# Patient Record
Sex: FEMALE | Race: WHITE
[De-identification: names, ages, dates, MRNs, and addresses within clinical notes are randomized per-mention and may not be internally consistent; named-entity substitution may affect disease eponyms.]

---

## 2019-08-28 ENCOUNTER — HOSPITAL ENCOUNTER (OUTPATIENT)
Dept: HOSPITAL 47 - LABWHC1 | Age: 79
Discharge: HOME | End: 2019-08-28
Attending: PSYCHIATRY & NEUROLOGY
Payer: MEDICARE

## 2019-08-28 DIAGNOSIS — E78.5: Primary | ICD-10-CM

## 2019-08-28 DIAGNOSIS — R41.3: ICD-10-CM

## 2019-08-28 LAB
CHOLEST SERPL-MCNC: 207 MG/DL (ref 0–200)
DSDNA AB SER QL: NEGATIVE
DSDNA AB TITR SER: 1 IU/ML
HDLC SERPL-MCNC: 71 MG/DL (ref 40–60)
LDLC SERPL CALC-MCNC: 116.2 MG/DL (ref 0–131)
T4 FREE SERPL-MCNC: 0.8 NG/DL (ref 0.8–1.8)
TRIGL SERPL-MCNC: 99 MG/DL (ref 0–149)
VLDLC SERPL CALC-MCNC: 19.8 MG/DL (ref 5–40)

## 2019-08-28 PROCEDURE — 85652 RBC SED RATE AUTOMATED: CPT

## 2019-08-28 PROCEDURE — 36415 COLL VENOUS BLD VENIPUNCTURE: CPT

## 2019-08-28 PROCEDURE — 86038 ANTINUCLEAR ANTIBODIES: CPT

## 2019-08-28 PROCEDURE — 80061 LIPID PANEL: CPT

## 2019-08-28 PROCEDURE — 86618 LYME DISEASE ANTIBODY: CPT

## 2019-08-28 PROCEDURE — 84439 ASSAY OF FREE THYROXINE: CPT

## 2019-08-28 PROCEDURE — 86780 TREPONEMA PALLIDUM: CPT

## 2019-08-28 PROCEDURE — 84443 ASSAY THYROID STIM HORMONE: CPT

## 2019-08-28 PROCEDURE — 82607 VITAMIN B-12: CPT

## 2019-08-28 PROCEDURE — 82747 ASSAY OF FOLIC ACID RBC: CPT

## 2019-08-28 PROCEDURE — 86225 DNA ANTIBODY NATIVE: CPT

## 2019-08-28 PROCEDURE — 86431 RHEUMATOID FACTOR QUANT: CPT

## 2019-08-29 LAB — B BURGDOR IGG SERPL QL IA: 0.06 INDEX

## 2019-09-13 ENCOUNTER — HOSPITAL ENCOUNTER (OUTPATIENT)
Dept: HOSPITAL 47 - RADMRIMAIN | Age: 79
Discharge: HOME | End: 2019-09-13
Attending: PSYCHIATRY & NEUROLOGY
Payer: MEDICARE

## 2019-09-13 DIAGNOSIS — Z87.39: ICD-10-CM

## 2019-09-13 DIAGNOSIS — I63.9: ICD-10-CM

## 2019-09-13 DIAGNOSIS — I73.9: Primary | ICD-10-CM

## 2019-09-13 PROCEDURE — 70551 MRI BRAIN STEM W/O DYE: CPT

## 2019-09-13 NOTE — MR
EXAMINATION TYPE: MR brain wo con

 

DATE OF EXAM: 9/13/2019

 

COMPARISON: NONE

 

HISTORY: CVA, Memory loss, discoordination

 

TECHNIQUE: 

Multiplanar, multisequence images of the brain and brainstem is performed without IV contrast.

 

FINDINGS: Diffusion weighted images demonstrate no evidence of a recent infarct or other diffusion ab
normality.  There is no extra-axial fluid collection. Extensive confluent white matter changes are se
en within the periventricular and subcortical white matter as well as within the pericallosal white m
atter. No infratentorial white matter change or brainstem white matter change. Old lacunar injuries a
re seen of the anterior limbs of the internal capsules The ventricular system and cisternal spaces ar
e symmetrically prominent compatible with age-related volume loss.

 

Midline structures demonstrate normal morphology.  The craniocervical junction appears within normal 
limits. The dural venous sinuses appear patent. The visualized sinuses displayed mild mucosal thicken
ing in the ethmoid sinuses and are otherwise clear and the globes are intact. There is partial opacif
ication of the left mastoid air cells. T2/FLAIR hyperintensity possible sebaceous cyst is seen within
 the left auricle.

 

IMPRESSION: 

1. Severe burden nonspecific white matter change, most commonly on the basis of chronic microangiopat
hy with age-related volume loss. No acute infarct, midline shift or mass effect.

2. Partial opacification of the left mastoid air cells. Correlate with point tenderness to exclude ma
stoiditis.

3. Possible sebaceous cyst within the left auricle. Correlate with physical exam.

4. Old lacunar injuries of the bilateral anterior limbs of the internal capsules.

## 2019-10-16 ENCOUNTER — HOSPITAL ENCOUNTER (OUTPATIENT)
Dept: HOSPITAL 47 - RADUSWWP | Age: 79
Discharge: HOME | End: 2019-10-16
Attending: PSYCHIATRY & NEUROLOGY
Payer: MEDICARE

## 2019-10-16 DIAGNOSIS — I65.23: Primary | ICD-10-CM

## 2019-10-16 DIAGNOSIS — G45.9: ICD-10-CM

## 2019-10-16 PROCEDURE — 93880 EXTRACRANIAL BILAT STUDY: CPT

## 2019-10-16 NOTE — US
EXAMINATION TYPE: US carotid duplex BILAT

 

DATE OF EXAM: 10/16/2019

 

COMPARISON: NONE

 

CLINICAL HISTORY: G45.9 Transient cerebral ischemic attack, unspecif. TIA

 

EXAM MEASUREMENTS: 

 

RIGHT:  Peak Systolic Velocity (PSV) cm/sec

----- Right CCA:  98.2  

----- Right ICA:  143     

----- Right ECA:  106   

ICA/CCA ratio:  1.46    

 

RIGHT:  End Diastole cm/sec

----- Right CCA:  30.4   

----- Right ICA:  45.5      

----- Right ECA:  25.2     

 

LEFT:  Peak Systolic Velocity (PSV) cm/sec

----- Left CCA:  89.4  

----- Left ICA:  109   

----- Left ECA:  67.5  

ICA/CCA ratio:  1.22  

 

LEFT:  End Diastole cm/sec

----- Left CCA:  31.0  

----- Left ICA:  37.3   

----- Left ECA:  15.9 

 

VERTEBRALS (direction of flow):

Right Vertebral: Antegrade

Left Vertebral: Antegrade

 

Rhythm:  Normal

 

Moderate to severe plaque right bifurcation. Mild plaque left bifurcation. Increased velocities  righ
t ICA

 

 

 

IMPRESSION:  

1. Moderate plaquing right carotid bifurcation between 50 and 69%.

2. Mild plaquing left carotid bifurcation with less than 50%.   

 

 

Criteria for Assigning % of Stenosis / Diameter reduction

(Estimation based on the indirect measurements of the internal carotid artery velocities (ICA PSV).

1.  Normal (no stenosis)=ICA PSV < 125 cm/s: ratio < 2.0: ICA EDV<40 cm/s.

2. Less than 50% stenosis=ICA PSV < 125 cm/s: ratio < 2.0: ICA EDV<40 cm/s.

3.  50 to 69% stenosis=ICA PSV of 125 to 230 cm/s: ration 2.0 ? 4.0: ICA EDV  cm/s.

4.  Greater than 70% stenosis to near occlusion= ICA PSV > 230 cm/s: ratio > 4.0: ICA EDV > 100 cm/s.
 

5.  Near occlusion= ICA PSV velocities may be low or undetectable: variable ratio and ICA EDV.

6.  Total occlusion=unable to detect flow.

## 2021-01-22 ENCOUNTER — HOSPITAL ENCOUNTER (EMERGENCY)
Dept: HOSPITAL 47 - EC | Age: 81
Discharge: HOME | End: 2021-01-22
Payer: MEDICARE

## 2021-01-22 VITALS
DIASTOLIC BLOOD PRESSURE: 59 MMHG | HEART RATE: 104 BPM | TEMPERATURE: 97.6 F | SYSTOLIC BLOOD PRESSURE: 123 MMHG | RESPIRATION RATE: 18 BRPM

## 2021-01-22 DIAGNOSIS — I10: ICD-10-CM

## 2021-01-22 DIAGNOSIS — K59.00: Primary | ICD-10-CM

## 2021-01-22 DIAGNOSIS — Z98.42: ICD-10-CM

## 2021-01-22 DIAGNOSIS — Z79.899: ICD-10-CM

## 2021-01-22 DIAGNOSIS — E78.5: ICD-10-CM

## 2021-01-22 DIAGNOSIS — F17.200: ICD-10-CM

## 2021-01-22 DIAGNOSIS — Z98.41: ICD-10-CM

## 2021-01-22 LAB
ALBUMIN SERPL-MCNC: 4.2 G/DL (ref 3.5–5)
ALP SERPL-CCNC: 109 U/L (ref 38–126)
ALT SERPL-CCNC: 16 U/L (ref 4–34)
ANION GAP SERPL CALC-SCNC: 10 MMOL/L
AST SERPL-CCNC: 30 U/L (ref 14–36)
BASOPHILS # BLD AUTO: 0.1 K/UL (ref 0–0.2)
BASOPHILS NFR BLD AUTO: 1 %
BUN SERPL-SCNC: 34 MG/DL (ref 7–17)
CALCIUM SPEC-MCNC: 9.9 MG/DL (ref 8.4–10.2)
CHLORIDE SERPL-SCNC: 106 MMOL/L (ref 98–107)
CO2 SERPL-SCNC: 26 MMOL/L (ref 22–30)
EOSINOPHIL # BLD AUTO: 0.1 K/UL (ref 0–0.7)
EOSINOPHIL NFR BLD AUTO: 1 %
ERYTHROCYTE [DISTWIDTH] IN BLOOD BY AUTOMATED COUNT: 3.64 M/UL (ref 3.8–5.4)
ERYTHROCYTE [DISTWIDTH] IN BLOOD: 13.5 % (ref 11.5–15.5)
GLUCOSE SERPL-MCNC: 128 MG/DL (ref 74–99)
HCT VFR BLD AUTO: 31.8 % (ref 34–46)
HGB BLD-MCNC: 10.9 GM/DL (ref 11.4–16)
LIPASE SERPL-CCNC: 31 U/L (ref 23–300)
LYMPHOCYTES # SPEC AUTO: 0.8 K/UL (ref 1–4.8)
LYMPHOCYTES NFR SPEC AUTO: 6 %
MCH RBC QN AUTO: 30 PG (ref 25–35)
MCHC RBC AUTO-ENTMCNC: 34.4 G/DL (ref 31–37)
MCV RBC AUTO: 87.2 FL (ref 80–100)
MONOCYTES # BLD AUTO: 0.6 K/UL (ref 0–1)
MONOCYTES NFR BLD AUTO: 5 %
NEUTROPHILS # BLD AUTO: 11.1 K/UL (ref 1.3–7.7)
NEUTROPHILS NFR BLD AUTO: 87 %
PLATELET # BLD AUTO: 260 K/UL (ref 150–450)
POTASSIUM SERPL-SCNC: 3.8 MMOL/L (ref 3.5–5.1)
PROT SERPL-MCNC: 6.9 G/DL (ref 6.3–8.2)
SODIUM SERPL-SCNC: 142 MMOL/L (ref 137–145)
WBC # BLD AUTO: 12.8 K/UL (ref 3.8–10.6)

## 2021-01-22 PROCEDURE — 83690 ASSAY OF LIPASE: CPT

## 2021-01-22 PROCEDURE — 99284 EMERGENCY DEPT VISIT MOD MDM: CPT

## 2021-01-22 PROCEDURE — 93005 ELECTROCARDIOGRAM TRACING: CPT

## 2021-01-22 PROCEDURE — 85025 COMPLETE CBC W/AUTO DIFF WBC: CPT

## 2021-01-22 PROCEDURE — 96360 HYDRATION IV INFUSION INIT: CPT

## 2021-01-22 PROCEDURE — 80053 COMPREHEN METABOLIC PANEL: CPT

## 2021-01-22 PROCEDURE — 74176 CT ABD & PELVIS W/O CONTRAST: CPT

## 2021-01-22 PROCEDURE — 83605 ASSAY OF LACTIC ACID: CPT

## 2021-01-22 NOTE — CT
EXAMINATION TYPE: CT abdomen pelvis wo con

 

DATE OF EXAM: 1/22/2021

 

COMPARISON: 11/12/2016

 

HISTORY: abdominal pain and constipation.

 

CT DLP: 439 mGycm

Automated exposure control for dose reduction was used.

 

The lung bases are clear. There is no pleural effusion. Heart size is normal. There is no pericardial
 effusion.

 

Liver spleen stomach pancreas appear intact. Gallbladder is intact. Bile ducts are not dilated.

 

There is no adrenal mass. Kidneys have normal size. There is no hydronephrosis. Ureters are not dilat
ed. Abdominal aorta is atheromatous. Bladder distends smoothly. There is some retained fecal material
 in the rectum 6.5 cm. There is no inguinal hernia. Retained fecal material throughout the large liv
l

 

There is no ascites. Sign of free air. No evidence of bowel obstruction. Appears normal.

 

Lumbar vertebra have normal alignment. There is degenerative disc space narrowing at L3-4 L4-5 with s
purring. There is no compression fracture. There is a mild relative to moderate spinal stenosis at L3
-4.

 

IMPRESSION:

Constipation and rectal fecal impaction

 

No renal stone or obstruction. L3-4 bony spinal stenosis. Constipation increased compared to old exam
.

## 2021-01-22 NOTE — ED
Abdominal Pain HPI





- General


Chief Complaint: Abdominal Pain


Stated Complaint: Adb pain/constipation


Source: patient


Mode of arrival: ambulatory


Limitations: no limitations





- History of Present Illness


Initial Comments: 





80-year-old female past history of COPD, hypertension, advanced dementia who 

presents emergency Department with reported constipation.   is at bedside

and helps provide the history.  Reports that the patient hasn't had a bowel 

movement in 4-5 days.  She does not go daily however  states that this is

the longest that she is ever gone without having a bowel movement.  She reports 

that she is still passing gas.  Denies history of bowel resection even though 

this is in her history.  She denies any melanic stools or hematochezia.  No 

changes in her urination.  Denies any low back pain.  She did not take any 

medications for her symptoms as of yet.   states that she does live a 

very sedentary lifestyle.  No fevers or chills.  No nausea or vomiting. The 

patient denies any abdominal pain.  Remainder of HPI is limited due to her 

history of dementia.





- Related Data


                                Home Medications











 Medication  Instructions  Recorded  Confirmed


 


Clopidogrel [Plavix] 75 mg PO DAILY 02/03/15 01/22/21


 


Losartan-Hctz 50-12.5 mg [Hyzaar 1 tab PO DAILY 02/03/15 01/22/21





50-12.5]   


 


Simvastatin [Zocor] 80 mg PO HS 02/03/15 01/22/21


 


Melatonin 10 mg PO HS PRN 02/05/15 01/22/21


 


Aspirin EC [Ecotrin Low Dose] 81 mg PO DAILY 01/22/21 01/22/21


 


Escitalopram Oxalate [Lexapro] 10 mg PO DAILY 01/22/21 01/22/21


 


Ginkgo Biloba 120mg 120 mg PO DAILY 01/22/21 01/22/21


 


Memantine HCl 10 mg PO BID 01/22/21 01/22/21


 


buPROPion XL [Wellbutrin Xl] 300 mg PO DAILY 01/22/21 01/22/21


 


diphenhydrAMINE [Benadryl] 25 mg PO DAILY PRN 01/22/21 01/22/21








                                  Previous Rx's











 Medication  Instructions  Recorded


 


Docusate [Colace] 100 mg PO BID #60 capsule 01/22/21


 


Polyethylene Glycol 3350 [Miralax] 17 gm PO DAILY #527 gm 01/22/21











                                    Allergies











Allergy/AdvReac Type Severity Reaction Status Date / Time


 


No Known Allergies Allergy   Verified 01/22/21 20:37














Review of Systems


ROS Statement: 


Those systems with pertinent positive or pertinent negative responses have been 

documented in the HPI.





ROS Other: All systems not noted in ROS Statement are negative.





Past Medical History


Past Medical History: COPD, Hyperlipidemia, Hypertension, Memory Impairment


Additional Past Medical History / Comment(s): HIATAL HERNIA


History of Any Multi-Drug Resistant Organisms: None Reported


Past Surgical History: Bowel Resection


Additional Past Surgical History / Comment(s): LT CAROTID ENDARTERECTOMY.  

COLONOSCOPY.  BILAT CATARACTS REMOVED.  D & C.  EGD


Past Anesthesia/Blood Transfusion Reactions: No Reported Reaction


Past Psychological History: No Psychological Hx Reported


Smoking Status: Current every day smoker


Past Alcohol Use History: Rare


Past Drug Use History: None Reported





- Past Family History


  ** Father


Family Medical History: Cancer





General Exam


Limitations: no limitations





Course


                                   Vital Signs











  01/22/21





  18:41


 


Temperature 97.6 F


 


Pulse Rate 104 H


 


Respiratory 18





Rate 


 


Blood Pressure 123/59


 


O2 Sat by Pulse 99





Oximetry 














Medical Decision Making





- Medical Decision Making





Upon arrival the patient was placed into room 4.  A thorough history and 

physical exam was performed.  Laboratory studies are conducted.  Creatinine is 

1.5 which is wrong the patient's last laboratory value.  Lactic acid 2.3.  She 

was given a liter bolus of normal saline.  CT was performed due to the inability

 to obtain a good history on the patient.  Does demonstrate constipation and 

fecal impaction.  The patient was given a milk of molasses enema.  She does have

 a successful bowel movement.  The patient will be placed on MiraLAX and 

docusate daily.  He need to follow up with her primary care physician in regards

 to a bowel regimen.  If the patient has any new or worsening symptoms return to

 the emergency room.   and patient were in agreement with the treatment 

plan she is discharged home in stable condition





- Lab Data


Result diagrams: 


                                 01/22/21 20:00





                                 01/22/21 20:00


                                   Lab Results











  01/22/21 01/22/21 01/22/21 Range/Units





  20:00 20:00 20:00 


 


WBC  12.8 H    (3.8-10.6)  k/uL


 


RBC  3.64 L    (3.80-5.40)  m/uL


 


Hgb  10.9 L    (11.4-16.0)  gm/dL


 


Hct  31.8 L    (34.0-46.0)  %


 


MCV  87.2    (80.0-100.0)  fL


 


MCH  30.0    (25.0-35.0)  pg


 


MCHC  34.4    (31.0-37.0)  g/dL


 


RDW  13.5    (11.5-15.5)  %


 


Plt Count  260    (150-450)  k/uL


 


MPV  7.8    


 


Neutrophils %  87    %


 


Lymphocytes %  6    %


 


Monocytes %  5    %


 


Eosinophils %  1    %


 


Basophils %  1    %


 


Neutrophils #  11.1 H    (1.3-7.7)  k/uL


 


Lymphocytes #  0.8 L    (1.0-4.8)  k/uL


 


Monocytes #  0.6    (0-1.0)  k/uL


 


Eosinophils #  0.1    (0-0.7)  k/uL


 


Basophils #  0.1    (0-0.2)  k/uL


 


Sodium   142   (137-145)  mmol/L


 


Potassium   3.8   (3.5-5.1)  mmol/L


 


Chloride   106   ()  mmol/L


 


Carbon Dioxide   26   (22-30)  mmol/L


 


Anion Gap   10   mmol/L


 


BUN   34 H   (7-17)  mg/dL


 


Creatinine   1.58 H   (0.52-1.04)  mg/dL


 


Est GFR (CKD-EPI)AfAm   36   (>60 ml/min/1.73 sqM)  


 


Est GFR (CKD-EPI)NonAf   31   (>60 ml/min/1.73 sqM)  


 


Glucose   128 H   (74-99)  mg/dL


 


Lactic Ac Sepsis Rflx     


 


Plasma Lactic Acid George    2.3 H*  (0.7-2.0)  mmol/L


 


Calcium   9.9   (8.4-10.2)  mg/dL


 


Total Bilirubin   0.9   (0.2-1.3)  mg/dL


 


AST   30   (14-36)  U/L


 


ALT   16   (4-34)  U/L


 


Alkaline Phosphatase   109   ()  U/L


 


Total Protein   6.9   (6.3-8.2)  g/dL


 


Albumin   4.2   (3.5-5.0)  g/dL


 


Lipase   31   ()  U/L














  01/22/21 Range/Units





  20:17 


 


WBC   (3.8-10.6)  k/uL


 


RBC   (3.80-5.40)  m/uL


 


Hgb   (11.4-16.0)  gm/dL


 


Hct   (34.0-46.0)  %


 


MCV   (80.0-100.0)  fL


 


MCH   (25.0-35.0)  pg


 


MCHC   (31.0-37.0)  g/dL


 


RDW   (11.5-15.5)  %


 


Plt Count   (150-450)  k/uL


 


MPV   


 


Neutrophils %   %


 


Lymphocytes %   %


 


Monocytes %   %


 


Eosinophils %   %


 


Basophils %   %


 


Neutrophils #   (1.3-7.7)  k/uL


 


Lymphocytes #   (1.0-4.8)  k/uL


 


Monocytes #   (0-1.0)  k/uL


 


Eosinophils #   (0-0.7)  k/uL


 


Basophils #   (0-0.2)  k/uL


 


Sodium   (137-145)  mmol/L


 


Potassium   (3.5-5.1)  mmol/L


 


Chloride   ()  mmol/L


 


Carbon Dioxide   (22-30)  mmol/L


 


Anion Gap   mmol/L


 


BUN   (7-17)  mg/dL


 


Creatinine   (0.52-1.04)  mg/dL


 


Est GFR (CKD-EPI)AfAm   (>60 ml/min/1.73 sqM)  


 


Est GFR (CKD-EPI)NonAf   (>60 ml/min/1.73 sqM)  


 


Glucose   (74-99)  mg/dL


 


Lactic Ac Sepsis Rflx  Y  


 


Plasma Lactic Acid George   (0.7-2.0)  mmol/L


 


Calcium   (8.4-10.2)  mg/dL


 


Total Bilirubin   (0.2-1.3)  mg/dL


 


AST   (14-36)  U/L


 


ALT   (4-34)  U/L


 


Alkaline Phosphatase   ()  U/L


 


Total Protein   (6.3-8.2)  g/dL


 


Albumin   (3.5-5.0)  g/dL


 


Lipase   ()  U/L














- EKG Data


EKG Comments: 





EKG demonstrates normal sinus rhythm with a ventricular rate of 91.  MO interval

 178.  QRS 86.  QTC of 434.  No acute ST segment elevations or depressions





Disposition


Clinical Impression: 


 Constipation





Disposition: HOME SELF-CARE


Condition: Stable


Instructions (If sedation given, give patient instructions):  Constipation (ED)


Additional Instructions: 


Take the medications prescribed every day.  Follow-up with your primary care 

doctor in 2-4 days.  Return to the emergency department for any new or worsening

 symptoms


Prescriptions: 


Docusate [Colace] 100 mg PO BID #60 capsule


Polyethylene Glycol 3350 [Miralax] 17 gm PO DAILY #527 gm


Is patient prescribed a controlled substance at d/c from ED?: No


Referrals: 


Maximilian Huggins DO [Primary Care Provider] - 1-2 days


Time of Disposition: 22:42

## 2022-02-23 ENCOUNTER — HOSPITAL ENCOUNTER (OUTPATIENT)
Dept: HOSPITAL 47 - RADUSWWP | Age: 82
Discharge: HOME | End: 2022-02-23
Attending: INTERNAL MEDICINE
Payer: MEDICARE

## 2022-02-23 DIAGNOSIS — N18.4: Primary | ICD-10-CM

## 2022-02-23 DIAGNOSIS — D63.1: ICD-10-CM

## 2022-02-23 DIAGNOSIS — Z86.73: ICD-10-CM

## 2022-02-23 DIAGNOSIS — E78.5: ICD-10-CM

## 2022-02-23 LAB
ALBUMIN SERPL-MCNC: 4.3 G/DL (ref 3.8–4.9)
ANION GAP SERPL CALC-SCNC: 13.8 MMOL/L (ref 10–18)
BUN SERPL-SCNC: 33.2 MG/DL (ref 9–27)
BUN/CREAT SERPL: 15.81 RATIO (ref 12–20)
CALCIUM SPEC-MCNC: 9.9 MG/DL (ref 8.7–10.3)
CHLORIDE SERPL-SCNC: 98 MMOL/L (ref 96–109)
CHOLEST SERPL-MCNC: 135 MG/DL (ref 0–200)
CO2 SERPL-SCNC: 24.2 MMOL/L (ref 20–27.5)
ERYTHROCYTE [DISTWIDTH] IN BLOOD BY AUTOMATED COUNT: 3.05 X 10*6/UL (ref 4.1–5.2)
ERYTHROCYTE [DISTWIDTH] IN BLOOD: 13.5 % (ref 11.5–14.5)
FERRITIN SERPL-MCNC: 147 NG/ML (ref 10–291)
GLUCOSE SERPL-MCNC: 186 MG/DL (ref 70–110)
HCT VFR BLD AUTO: 27.8 % (ref 37.2–46.3)
HDLC SERPL-MCNC: 78 MG/DL (ref 40–60)
HGB BLD-MCNC: 8.5 G/DL (ref 12–15)
IRON SERPL-MCNC: 31 UG/DL (ref 50–170)
LDLC SERPL CALC-MCNC: 45.3 MG/DL (ref 0–131)
MAGNESIUM SPEC-SCNC: 2.9 MG/DL (ref 1.5–2.4)
MCH RBC QN AUTO: 27.9 PG (ref 27–32)
MCHC RBC AUTO-ENTMCNC: 30.6 G/DL (ref 32–37)
MCV RBC AUTO: 91.1 FL (ref 80–97)
NRBC BLD AUTO-RTO: 0 /100 WBCS (ref 0–0)
PLATELET # BLD AUTO: 318 X 10*3/UL (ref 140–440)
POTASSIUM SERPL-SCNC: 4.6 MMOL/L (ref 3.5–5.5)
SODIUM SERPL-SCNC: 136 MMOL/L (ref 135–145)
TIBC SERPL-MCNC: 370 UG/DL (ref 228–460)
TRIGL SERPL-MCNC: 58.6 MG/DL (ref 0–149)
VLDLC SERPL CALC-MCNC: 11.72 MG/DL (ref 5–40)
WBC # BLD AUTO: 10.16 X 10*3/UL (ref 4.5–10)

## 2022-02-23 PROCEDURE — 80061 LIPID PANEL: CPT

## 2022-02-23 PROCEDURE — 80048 BASIC METABOLIC PNL TOTAL CA: CPT

## 2022-02-23 PROCEDURE — 82728 ASSAY OF FERRITIN: CPT

## 2022-02-23 PROCEDURE — 83550 IRON BINDING TEST: CPT

## 2022-02-23 PROCEDURE — 83735 ASSAY OF MAGNESIUM: CPT

## 2022-02-23 PROCEDURE — 82040 ASSAY OF SERUM ALBUMIN: CPT

## 2022-02-23 PROCEDURE — 84165 PROTEIN E-PHORESIS SERUM: CPT

## 2022-02-23 PROCEDURE — 83540 ASSAY OF IRON: CPT

## 2022-02-23 PROCEDURE — 84100 ASSAY OF PHOSPHORUS: CPT

## 2022-02-23 PROCEDURE — 82306 VITAMIN D 25 HYDROXY: CPT

## 2022-02-23 PROCEDURE — 83970 ASSAY OF PARATHORMONE: CPT

## 2022-02-23 PROCEDURE — 85027 COMPLETE CBC AUTOMATED: CPT

## 2022-02-23 PROCEDURE — 76770 US EXAM ABDO BACK WALL COMP: CPT

## 2022-02-23 PROCEDURE — 83036 HEMOGLOBIN GLYCOSYLATED A1C: CPT

## 2022-02-23 NOTE — US
EXAMINATION TYPE: US kidneys/renal and bladder

 

DATE OF EXAM: 2/23/2022

 

COMPARISON: NONE

 

CLINICAL HISTORY: N18.4 CHRONIC KIDNEY DISEASE, STAGE 4 (SEVERE). CKD stage 4, patient has h/o stroke
, confused patient

 

EXAM MEASUREMENTS:

 

Right Kidney:  9.7 x 4.0 x 3.7 cm

Left Kidney: not seen 

 

**images are blurred due to patients inability to stay still**

Right Kidney: No hydronephrosis or masses seen  

Left Kidney: unable to view due to bowel gas and patient unable to roll  

Bladder: not distended, she did do prep

 

 

IMPRESSION:

1. Limited examination due to patient inability to cooperate.

2. Portions of the right kidney visualized appear normal. Left kidney is not able to be evaluated at 
this time.

## 2022-02-25 ENCOUNTER — HOSPITAL ENCOUNTER (OUTPATIENT)
Dept: HOSPITAL 47 - RADECHMAIN | Age: 82
Discharge: HOME | End: 2022-02-25
Attending: FAMILY MEDICINE
Payer: MEDICARE

## 2022-02-25 DIAGNOSIS — I08.3: Primary | ICD-10-CM

## 2022-02-25 DIAGNOSIS — I31.3: ICD-10-CM

## 2022-02-25 LAB
ALBUMIN SERPL ELPH-MCNC: 3.91 G/DL (ref 3.8–4.9)
GAMMA GLOB SERPL ELPH-MCNC: 0.86 G/DL (ref 0.7–1.5)

## 2022-02-25 PROCEDURE — 93306 TTE W/DOPPLER COMPLETE: CPT

## 2022-02-25 NOTE — ECHOF
Referral Reason:I50.9 CHF



MEASUREMENTS

--------

HEIGHT: 157.5 cm

WEIGHT: 61.7 kg

BP: 

IVSd:   1.4 cm     (0.6 - 1.1)

LVIDd:   3.9 cm     (3.9 - 5.3)

LVPWd:   1.1 cm     (0.6 - 1.1)

IVSs:   1.9 cm

LVIDs:   1.9 cm

LVPWs:   1.6 cm

LAESV Index (A-L):   16.07 ml/m

Ao Diam:   2.7 cm     (2.0 - 3.7)

AV Cusp:   1.6 cm     (1.5 - 2.6)

LA Diam:   2.4 cm     (2.7 - 3.8)

MV EXCURSION:   12.408 mm     (> 18.000)

MV EF SLOPE:   54 mm/s     (70 - 150)

EPSS:   0.3 cm

MV E Wm:   0.88 m/s

MV DecT:   194 ms

MV A Wm:   1.04 m/s

MV E/A Ratio:   0.85 

RAP:   5.00 mmHg

RVSP:   17.61 mmHg







FINDINGS

--------

Sinus rhythm.

This was a technically good study.

The left ventricular size is normal.   There is mild concentric left ventricular hypertrophy.   Overa
ll left ventricular systolic function is normal with, an EF between 55 - 60 %.   The diastolic fillin
g pattern is normal for the age of the patient 11.23.

The right ventricle is normal in size.

Normal LA  size by volume 22+/-6 ml/m2.

The right atrial size is normal.

There is moderate aortic valve sclerosis.

Moderate mitral annular calcification present.   Mild-to-moderate mitral regurgitation is present.

The tricuspid valve appears structurally normal.   Mild tricuspid regurgitation present.   Right vent
ricular systolic pressure is normal at < 35 mmHg.

There is no pulmonic regurgitation present.

The aortic root size is normal.

Normal inferior vena cava with normal inspiratory collapse consistent with estimated right atrial pre
ssure of  5 mmHg.

There is a trivial pericardial effusion present.



CONCLUSIONS

--------

1. There is mild concentric left ventricular hypertrophy.

2. Overall left ventricular systolic function is normal with, an EF between 55 - 60 %.

3. There is moderate aortic valve sclerosis.

4. Moderate mitral annular calcification present.

5. Mild-to-moderate mitral regurgitation is present.

6. Mild tricuspid regurgitation present.

7. There is a trivial pericardial effusion present.





SONOGRAPHER: Xenia Calderon RDCS

## 2022-04-20 ENCOUNTER — HOSPITAL ENCOUNTER (OUTPATIENT)
Dept: HOSPITAL 47 - RADUSWWP | Age: 82
Discharge: HOME | End: 2022-04-20
Attending: INTERNAL MEDICINE
Payer: MEDICARE

## 2022-04-20 DIAGNOSIS — N18.4: Primary | ICD-10-CM

## 2022-04-20 PROCEDURE — 76770 US EXAM ABDO BACK WALL COMP: CPT

## 2022-04-20 NOTE — US
EXAMINATION TYPE: US kidneys/renal and bladder

 

DATE OF EXAM: 4/20/2022

 

COMPARISON: CT& US

 

CLINICAL HISTORY: N18.4 CKD STAGE 4. CKD, left kidney unable to be visualized on prior

 

EXAM MEASUREMENTS:

 

Right Kidney:  9.6 x 4.3 x 3.9 cm

Left Kidney: 10.0 x 5.3 x 4.2 cm

 

 

**Pt appeared to have severe dementia, unable to take breath in and hold it, very gassy, hard to unde
rstand tech directions during exam**

 

Right Kidney: No evidence of hydro, lower pole gassed out  

Left Kidney: No evidence of hydro, lower pole gassed out   

Bladder: wnl

**Bilateral Jets seen: No

 

 

There is no evidence for hydronephrosis at this point in time.  No nephrolithiasis is seen.  No nic
s are identified.  The urinary bladder is anechoic.  Bilateral ureteral jets are seen.

 

 

 

IMPRESSION:

No distinct abnormality seen.

## 2022-05-05 ENCOUNTER — HOSPITAL ENCOUNTER (OUTPATIENT)
Dept: HOSPITAL 47 - EC | Age: 82
Setting detail: OBSERVATION
LOS: 2 days | Discharge: ANOTHER HOSPITAL | End: 2022-05-07
Attending: FAMILY MEDICINE | Admitting: FAMILY MEDICINE
Payer: MEDICARE

## 2022-05-05 DIAGNOSIS — Z98.890: ICD-10-CM

## 2022-05-05 DIAGNOSIS — K83.1: Primary | ICD-10-CM

## 2022-05-05 DIAGNOSIS — Z80.9: ICD-10-CM

## 2022-05-05 DIAGNOSIS — R79.89: ICD-10-CM

## 2022-05-05 DIAGNOSIS — Z79.02: ICD-10-CM

## 2022-05-05 DIAGNOSIS — I71.4: ICD-10-CM

## 2022-05-05 DIAGNOSIS — Z87.891: ICD-10-CM

## 2022-05-05 DIAGNOSIS — N18.4: ICD-10-CM

## 2022-05-05 DIAGNOSIS — K86.89: ICD-10-CM

## 2022-05-05 DIAGNOSIS — F03.90: ICD-10-CM

## 2022-05-05 DIAGNOSIS — Z20.822: ICD-10-CM

## 2022-05-05 DIAGNOSIS — Z79.82: ICD-10-CM

## 2022-05-05 DIAGNOSIS — K44.9: ICD-10-CM

## 2022-05-05 DIAGNOSIS — I12.9: ICD-10-CM

## 2022-05-05 DIAGNOSIS — Z90.49: ICD-10-CM

## 2022-05-05 DIAGNOSIS — J44.9: ICD-10-CM

## 2022-05-05 DIAGNOSIS — N20.0: ICD-10-CM

## 2022-05-05 DIAGNOSIS — Z71.9: ICD-10-CM

## 2022-05-05 DIAGNOSIS — Z86.79: ICD-10-CM

## 2022-05-05 DIAGNOSIS — Z79.899: ICD-10-CM

## 2022-05-05 DIAGNOSIS — E78.5: ICD-10-CM

## 2022-05-05 LAB
ALBUMIN SERPL-MCNC: 4 G/DL (ref 3.5–5)
ALP SERPL-CCNC: 1585 U/L (ref 38–126)
ALT SERPL-CCNC: 348 U/L (ref 4–34)
ANION GAP SERPL CALC-SCNC: 10 MMOL/L
APTT BLD: 24.1 SEC (ref 22–30)
AST SERPL-CCNC: 314 U/L (ref 14–36)
BASOPHILS # BLD AUTO: 0.1 K/UL (ref 0–0.2)
BASOPHILS NFR BLD AUTO: 1 %
BILIRUB INDIRECT SERPL-MCNC: 1.8 MG/DL (ref 0–1.1)
BILIRUBIN DIRECT+TOT PNL SERPL-MCNC: 3.2 MG/DL (ref 0–0.2)
BUN SERPL-SCNC: 33 MG/DL (ref 7–17)
CALCIUM SPEC-MCNC: 9.6 MG/DL (ref 8.4–10.2)
CHLORIDE SERPL-SCNC: 98 MMOL/L (ref 98–107)
CO2 SERPL-SCNC: 27 MMOL/L (ref 22–30)
EOSINOPHIL # BLD AUTO: 0.1 K/UL (ref 0–0.7)
EOSINOPHIL NFR BLD AUTO: 2 %
ERYTHROCYTE [DISTWIDTH] IN BLOOD BY AUTOMATED COUNT: 3.69 M/UL (ref 3.8–5.4)
ERYTHROCYTE [DISTWIDTH] IN BLOOD: 15 % (ref 11.5–15.5)
GLUCOSE SERPL-MCNC: 339 MG/DL (ref 74–99)
HCT VFR BLD AUTO: 34.9 % (ref 34–46)
HGB BLD-MCNC: 10.7 GM/DL (ref 11.4–16)
INR PPP: 1.1 (ref ?–1.2)
LIPASE SERPL-CCNC: 33 U/L (ref 23–300)
LYMPHOCYTES # SPEC AUTO: 1.3 K/UL (ref 1–4.8)
LYMPHOCYTES NFR SPEC AUTO: 18 %
MAGNESIUM SPEC-SCNC: 2.7 MG/DL (ref 1.6–2.3)
MCH RBC QN AUTO: 29.1 PG (ref 25–35)
MCHC RBC AUTO-ENTMCNC: 30.8 G/DL (ref 31–37)
MCV RBC AUTO: 94.5 FL (ref 80–100)
MONOCYTES # BLD AUTO: 0.4 K/UL (ref 0–1)
MONOCYTES NFR BLD AUTO: 5 %
NEUTROPHILS # BLD AUTO: 4.8 K/UL (ref 1.3–7.7)
NEUTROPHILS NFR BLD AUTO: 71 %
PLATELET # BLD AUTO: 200 K/UL (ref 150–450)
POTASSIUM SERPL-SCNC: 4.5 MMOL/L (ref 3.5–5.1)
PROT SERPL-MCNC: 7.3 G/DL (ref 6.3–8.2)
PT BLD: 11.6 SEC (ref 9–12)
SODIUM SERPL-SCNC: 135 MMOL/L (ref 137–145)
WBC # BLD AUTO: 6.8 K/UL (ref 3.8–10.6)

## 2022-05-05 PROCEDURE — 87635 SARS-COV-2 COVID-19 AMP PRB: CPT

## 2022-05-05 PROCEDURE — 80074 ACUTE HEPATITIS PANEL: CPT

## 2022-05-05 PROCEDURE — 85610 PROTHROMBIN TIME: CPT

## 2022-05-05 PROCEDURE — 82105 ALPHA-FETOPROTEIN SERUM: CPT

## 2022-05-05 PROCEDURE — 85025 COMPLETE CBC W/AUTO DIFF WBC: CPT

## 2022-05-05 PROCEDURE — 82140 ASSAY OF AMMONIA: CPT

## 2022-05-05 PROCEDURE — 80053 COMPREHEN METABOLIC PANEL: CPT

## 2022-05-05 PROCEDURE — 85027 COMPLETE CBC AUTOMATED: CPT

## 2022-05-05 PROCEDURE — 85730 THROMBOPLASTIN TIME PARTIAL: CPT

## 2022-05-05 PROCEDURE — 99285 EMERGENCY DEPT VISIT HI MDM: CPT

## 2022-05-05 PROCEDURE — 86301 IMMUNOASSAY TUMOR CA 19-9: CPT

## 2022-05-05 PROCEDURE — 83690 ASSAY OF LIPASE: CPT

## 2022-05-05 PROCEDURE — 36415 COLL VENOUS BLD VENIPUNCTURE: CPT

## 2022-05-05 PROCEDURE — 74176 CT ABD & PELVIS W/O CONTRAST: CPT

## 2022-05-05 PROCEDURE — 83735 ASSAY OF MAGNESIUM: CPT

## 2022-05-05 PROCEDURE — 93005 ELECTROCARDIOGRAM TRACING: CPT

## 2022-05-05 PROCEDURE — 82248 BILIRUBIN DIRECT: CPT

## 2022-05-05 RX ADMIN — CEFAZOLIN SCH: 330 INJECTION, POWDER, FOR SOLUTION INTRAMUSCULAR; INTRAVENOUS at 18:53

## 2022-05-05 NOTE — CT
EXAMINATION TYPE: CT abdomen pelvis wo con

CT DLP: 502.5 mGycm, Automated exposure control for dose reduction was used.

 

DATE OF EXAM: 5/5/2022 6:04 PM

 

COMPARISON:   CT abdomen pelvis most recent from 1/22/2021.

CLINICAL INDICATION:Female, 81 years old with history of new onset jaundice; Jaundice

 

TECHNIQUE:  Standard  CT of the abdomen and pelvis following the administration of 100 cc of Isovue 3
00 IV contrast material. Coronal and sagittal reformats were performed. 

 

FINDINGS: 

LOWER CHEST: Unremarkable

 

ABDOMEN

LIVER: Unremarkable

GALLBLADDER AND BILE DUCTS: Interval increase in dilation of the intrahepatic and hepatic biliary sys
tem measuring up to 14 mm at the common bile duct.

PANCREAS: Ill-defined soft tissue around the pancreatic head with dilation of the main pancreatic leonel
t measuring similarly at 8 mm. Pancreatic duct dilations grossly similar prior however selectively ev
aluated given lack of IV contrast

SPLEEN: Unremarkable.

ADRENAL GLANDS: Unremarkable.

KIDNEYS AND URETERS: No evidence of hydronephrosis. Left lower pole 2 mm nonobstructing calculus. 

 

PELVIS

BLADDER: Unremarkable

REPRODUCTIVE: Unremarkable.

 

ABDOMEN & PELVIS

STOMACH AND BOWEL: There is large stool burden throughout the colon. No evidence of bowel obstruction
. 

PERITONEUM: No evidence of pneumoperitoneum or free fluid.

 

VASCULATURE: Stable appearance of infrarenal aneurysm measuring up to 2.5 cm in transverse dimension

MUSCULOSKELETAL: No acute osseous abnormalities. Multilevel disc degeneration changes with straighten
ing of the spine. Findings are worse at L3-L4 and L4-L5 these findings are not significantly changed 
from prior in 2021.

LYMPH NODES: No gross evidence for lymphadenopathy.

SOFT TISSUE/ABDOMINAL WALL: Unremarkable

 

IMPRESSION:

1. New intrahepatic and increased in extrahepatic biliary ductal dilatation compared to prior centere
d around the pancreatic head with ill-defined soft tissues. Additionally there is similar dilation of
 the pancreatic duct which is suboptimally evaluated on this noncontrast evaluation. Correlate with s
chris lipase underlying superimposed pancreatitis. Further evaluation the biliary system with MRCP wit
h IV contrast for further evaluation of pancreatic head for obstructing mass and/or calculus. 

2. Large stool burden throughout the colon could for constipation.

3. Nonobstructing left renal calculus.

4. 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

## 2022-05-05 NOTE — ED
General Adult HPI





- General


Chief complaint: Recheck/Abnormal Lab/Rx


Stated complaint: Jaundice


Time Seen by Provider: 05/05/22 15:25


Source: patient, family


Mode of arrival: ambulatory


Limitations: altered mental status





- History of Present Illness


Initial comments: 


Dictation was produced using dragon dictation software. please excuse any 

grammatical, word or spelling errors. 











Chief Complaint: 81-year-old female asked medical history of kidney issues, COPD

dyslipidemia presents to the ER for jaundice





History of Present Illness: Patient is 81-year-old female she was at her primary

care physician's office today for a scheduled appointment.  She was sent here to

the emergency department for jaundice.  Patient alleges started having a lot of 

the skin starting in the last 48 hours.   at the bedside provides history

of present illness reports that patient has been a little more sleepy than 

usual.  She has history of kidney disease.  She isn't complaining of any pain.  

She's been tolerating oral intake.  Patient's only abdominal surgical history is

bowel resection.





The ROS documented in this emergency department record has been reviewed and 

confirmed by me.  Those systems with pertinent positive or negative responses 

have been documented in the HPI.  All other systems are other negative and/or 

noncontributory.








PHYSICAL EXAM:


General Impression: Alert and oriented x3, not in acute distress, jaundiced


HEENT: Normocephalic atraumatic, extra-ocular movements intact, pupils equal and

reactive to light bilaterally, mucous membranes moist, icteric


Cardiovascular: Heart regular rate and rhythm


Chest: Able to complete full sentences, no retractions, no tachypnea


Abdomen: abdomen soft, non-tender, non-distended, no organomegaly


Musculoskeletal: Pulses present and equal in all extremities, no peripheral 

edema


Motor:  no focal deficits noted


Neurological: CN II-XII grossly intact, no focal motor or sensory deficits 

noted, no asterixis


Skin: Jaundice


Psych: Normal affect and mood





ED course: 81-year-old female presents to the emergency department for jaundice.

 Vital signs upon arrival are within acceptable limits.  Patient's well-

appearing at bedside.


Laboratory evaluation obtained.  CBC within acceptable limits.  Hemoglobin is 

10.7 which is above patient's baseline.  Coag panel is negative.  Metabolic 

panel shows elevated renal markers which is around patient's baseline.  

Magnesium is 2.7.  Total bilirubin is 12.8 with predilection towards conjugated 

bilirubin.  Patient has elevated liver enzymes notably alk phos.  Patient has 

painless jaundice.  CT imaging of the abdomen and pelvis without contrast was 

obtained.  Showing extrahepatic biliary ductal dilatation, similar pancreatic 

duct dilatation.  Patient reevaluated bedside at 6:30 PM found to be in stable 

medical condition.  She continues to be without any distress.  Given new onset 

acute jaundice patient be admitted with consultation to gastroenterology.





EKG interpretation: Ventricular rate 76, sinus rhythm,.  Interval to a 7, QS 95,

QTc 438. No RI prolongation, no QTC prolongation, no ST or T-wave changes noted.

 Overall, this EKG is unremarkable











- Related Data


                                Home Medications











 Medication  Instructions  Recorded  Confirmed


 


Clopidogrel [Plavix] 75 mg PO DAILY 02/03/15 05/05/22


 


Aspirin EC [Ecotrin Low Dose] 81 mg PO DAILY 01/22/21 05/05/22


 


Escitalopram Oxalate [Lexapro] 5 mg PO DAILY 01/22/21 05/05/22


 


Memantine HCl 10 mg PO BID 01/22/21 05/05/22


 


Cholecalciferol [Vitamin D3 (25 25 mcg PO DAILY 05/05/22 05/05/22





Mcg = 1000 Iu)]   


 


Ferrous Sulfate [Feosol] 325 mg PO DAILY 05/05/22 05/05/22


 


Ginkgo Biloba 1200mg 1 tab PO DAILY 05/05/22 05/05/22


 


Simvastatin 40 mg PO HS 05/05/22 05/05/22


 


amLODIPine [Norvasc] 5 mg PO DAILY 05/05/22 05/05/22


 


buPROPion HCL [Wellbutrin XL] 150 mg PO DAILY 05/05/22 05/05/22











                                    Allergies











Allergy/AdvReac Type Severity Reaction Status Date / Time


 


No Known Allergies Allergy   Verified 05/05/22 16:52














Review of Systems


ROS Statement: 


Those systems with pertinent positive or pertinent negative responses have been 

documented in the HPI.





ROS Other: All systems not noted in ROS Statement are negative.





Past Medical History


Past Medical History: COPD, Dementia, Hyperlipidemia, Hypertension, Memory 

Impairment, Renal Disease


Additional Past Medical History / Comment(s): HIATAL HERNIA


History of Any Multi-Drug Resistant Organisms: None Reported


Past Surgical History: Bowel Resection


Additional Past Surgical History / Comment(s): LT CAROTID ENDARTERECTOMY.  

COLONOSCOPY.  BILAT CATARACTS REMOVED.  D & C.  EGD


Past Anesthesia/Blood Transfusion Reactions: No Reported Reaction


Past Psychological History: No Psychological Hx Reported


Smoking Status: Former smoker


Past Alcohol Use History: Rare


Past Drug Use History: None Reported





- Past Family History


  ** Father


Family Medical History: Cancer





General Exam


Limitations: altered mental status





Course


                                   Vital Signs











  05/05/22 05/05/22 05/05/22





  15:18 16:23 18:00


 


Temperature 98.2 F  


 


Pulse Rate 77 74 77


 


Respiratory 16 16 18





Rate   


 


Blood Pressure 141/70 131/78 162/77


 


O2 Sat by Pulse 98 96 95





Oximetry   














Medical Decision Making





- Lab Data


Result diagrams: 


                                 05/05/22 15:36





                                 05/05/22 15:36


                                   Lab Results











  05/05/22 05/05/22 05/05/22 Range/Units





  15:23 15:36 15:36 


 


WBC    6.8  (3.8-10.6)  k/uL


 


RBC    3.69 L  (3.80-5.40)  m/uL


 


Hgb    10.7 L  (11.4-16.0)  gm/dL


 


Hct    34.9  (34.0-46.0)  %


 


MCV    94.5  (80.0-100.0)  fL


 


MCH    29.1  (25.0-35.0)  pg


 


MCHC    30.8 L  (31.0-37.0)  g/dL


 


RDW    15.0  (11.5-15.5)  %


 


Plt Count    200  (150-450)  k/uL


 


MPV    10.0  


 


Neutrophils %    71  %


 


Lymphocytes %    18  %


 


Monocytes %    5  %


 


Eosinophils %    2  %


 


Basophils %    1  %


 


Neutrophils #    4.8  (1.3-7.7)  k/uL


 


Lymphocytes #    1.3  (1.0-4.8)  k/uL


 


Monocytes #    0.4  (0-1.0)  k/uL


 


Eosinophils #    0.1  (0-0.7)  k/uL


 


Basophils #    0.1  (0-0.2)  k/uL


 


Hypochromasia    Slight  


 


PT     (9.0-12.0)  sec


 


INR     (<1.2)  


 


APTT     (22.0-30.0)  sec


 


Sodium   135 L   (137-145)  mmol/L


 


Potassium   4.5   (3.5-5.1)  mmol/L


 


Chloride   98   ()  mmol/L


 


Carbon Dioxide   27   (22-30)  mmol/L


 


Anion Gap   10   mmol/L


 


BUN   33 H   (7-17)  mg/dL


 


Creatinine   1.64 H   (0.52-1.04)  mg/dL


 


Est GFR (CKD-EPI)AfAm   34   (>60 ml/min/1.73 sqM)  


 


Est GFR (CKD-EPI)NonAf   30   (>60 ml/min/1.73 sqM)  


 


Glucose   339 H   (74-99)  mg/dL


 


Calcium   9.6   (8.4-10.2)  mg/dL


 


Magnesium   2.7 H   (1.6-2.3)  mg/dL


 


Total Bilirubin   12.8 H   (0.2-1.3)  mg/dL


 


Conjugated Bilirubin   7.8 H   (0.0-0.3)  mg/dL


 


Unconjugated Bilirubin   1.8 H   (0.0-1.1)  mg/dL


 


Delta Bilirubin   3.2 H   (0.0-0.2)  mg/dL


 


AST   314 H   (14-36)  U/L


 


ALT   348 H   (4-34)  U/L


 


Alkaline Phosphatase   1585 H   ()  U/L


 


Ammonia  12    (<30)  umol/L


 


Total Protein   7.3   (6.3-8.2)  g/dL


 


Albumin   4.0   (3.5-5.0)  g/dL


 


Lipase   33   ()  U/L














  05/05/22 Range/Units





  15:36 


 


WBC   (3.8-10.6)  k/uL


 


RBC   (3.80-5.40)  m/uL


 


Hgb   (11.4-16.0)  gm/dL


 


Hct   (34.0-46.0)  %


 


MCV   (80.0-100.0)  fL


 


MCH   (25.0-35.0)  pg


 


MCHC   (31.0-37.0)  g/dL


 


RDW   (11.5-15.5)  %


 


Plt Count   (150-450)  k/uL


 


MPV   


 


Neutrophils %   %


 


Lymphocytes %   %


 


Monocytes %   %


 


Eosinophils %   %


 


Basophils %   %


 


Neutrophils #   (1.3-7.7)  k/uL


 


Lymphocytes #   (1.0-4.8)  k/uL


 


Monocytes #   (0-1.0)  k/uL


 


Eosinophils #   (0-0.7)  k/uL


 


Basophils #   (0-0.2)  k/uL


 


Hypochromasia   


 


PT  11.6  (9.0-12.0)  sec


 


INR  1.1  (<1.2)  


 


APTT  24.1  (22.0-30.0)  sec


 


Sodium   (137-145)  mmol/L


 


Potassium   (3.5-5.1)  mmol/L


 


Chloride   ()  mmol/L


 


Carbon Dioxide   (22-30)  mmol/L


 


Anion Gap   mmol/L


 


BUN   (7-17)  mg/dL


 


Creatinine   (0.52-1.04)  mg/dL


 


Est GFR (CKD-EPI)AfAm   (>60 ml/min/1.73 sqM)  


 


Est GFR (CKD-EPI)NonAf   (>60 ml/min/1.73 sqM)  


 


Glucose   (74-99)  mg/dL


 


Calcium   (8.4-10.2)  mg/dL


 


Magnesium   (1.6-2.3)  mg/dL


 


Total Bilirubin   (0.2-1.3)  mg/dL


 


Conjugated Bilirubin   (0.0-0.3)  mg/dL


 


Unconjugated Bilirubin   (0.0-1.1)  mg/dL


 


Delta Bilirubin   (0.0-0.2)  mg/dL


 


AST   (14-36)  U/L


 


ALT   (4-34)  U/L


 


Alkaline Phosphatase   ()  U/L


 


Ammonia   (<30)  umol/L


 


Total Protein   (6.3-8.2)  g/dL


 


Albumin   (3.5-5.0)  g/dL


 


Lipase   ()  U/L














Disposition


Clinical Impression: 


 Biliary obstruction





Disposition: ADMITTED AS IP TO THIS [facility]


Condition: Fair


Referrals: 


Maximilian Huggins DO [Primary Care Provider] - 1-2 days


Decision Time: 18:37

## 2022-05-06 LAB
ALBUMIN SERPL-MCNC: 4.3 G/DL (ref 3.5–5)
ALBUMIN/GLOB SERPL: 1.3 {RATIO}
ALP SERPL-CCNC: 1713 U/L (ref 38–126)
ALT SERPL-CCNC: 363 U/L (ref 4–34)
ANION GAP SERPL CALC-SCNC: 16 MMOL/L
AST SERPL-CCNC: 333 U/L (ref 14–36)
BUN SERPL-SCNC: 34 MG/DL (ref 7–17)
CALCIUM SPEC-MCNC: 9.7 MG/DL (ref 8.4–10.2)
CHLORIDE SERPL-SCNC: 99 MMOL/L (ref 98–107)
CO2 SERPL-SCNC: 23 MMOL/L (ref 22–30)
ERYTHROCYTE [DISTWIDTH] IN BLOOD BY AUTOMATED COUNT: 3.8 M/UL (ref 3.8–5.4)
ERYTHROCYTE [DISTWIDTH] IN BLOOD: 15.2 % (ref 11.5–15.5)
GLOBULIN SER CALC-MCNC: 3.4 G/DL
GLUCOSE SERPL-MCNC: 357 MG/DL (ref 74–99)
HCT VFR BLD AUTO: 36.3 % (ref 34–46)
HGB BLD-MCNC: 11 GM/DL (ref 11.4–16)
LIPASE SERPL-CCNC: 39 U/L (ref 23–300)
MCH RBC QN AUTO: 29.1 PG (ref 25–35)
MCHC RBC AUTO-ENTMCNC: 30.4 G/DL (ref 31–37)
MCV RBC AUTO: 95.6 FL (ref 80–100)
PLATELET # BLD AUTO: 220 K/UL (ref 150–450)
POTASSIUM SERPL-SCNC: 5 MMOL/L (ref 3.5–5.1)
PROT SERPL-MCNC: 7.7 G/DL (ref 6.3–8.2)
SODIUM SERPL-SCNC: 138 MMOL/L (ref 137–145)
WBC # BLD AUTO: 6.9 K/UL (ref 3.8–10.6)

## 2022-05-06 NOTE — P.CONS
History of Present Illness





- Reason for Consult


Consult date: 05/06/22


Biliary obstruction


Requesting physician: Maximilian Huggins





- Chief Complaint


Jaundice





- History of Present Illness





This is a 81-year-old female with a past medical history of kidney disease, 

COPD, dyslipidemia and underlying dementia who was brought into the emergency 

department by her PCP Dr. Huggins for concerns of jaundice.  Most of the history 

is being obtained from the chart as patient has confusion and unable to answer 

questions appropriately.  She appears comfortable, denies any abdominal pain.  

She's had no nausea or vomiting.  She did have a CT of the abdomen and pelvis as

part of her workup with concerns of biliary obstruction.  She was noted to have 

hyperbilirubinemia and elevated LFTs on admission.





CT abdomen and pelvis show new intrahepatic and increased and extrahepatic 

biliary ductal dilation compared to prior centered around the hepatic head with 

ill-defined soft tissues.  Additionally there is similar dilation of the 

pancreatic duct which is suboptimally evaluated.  Correlate with serum lipase 

underlying superimposed pancreatitis.  Further evaluation of biliary system with

MRCP with IV contrast for further evaluation pancreatic head obstructing mass or

calculus.  A large stool burden throughout the colon, nonobstructing left renal 

calculus, and infrarenal aortic aneurysm dilation stable back to 2021





WBC 6.9 hemoglobin 11.0 hematocrit 36 platelet count 220,000 INR 1.1 sodium 138 

potassium 5.0 BUN 34 creatinine 1.76 glucose 357 total bilirubin 14.7, 

conjugated bilirubin 7.8 unconjugated bilirubin 1.8   alkaline 

phosphatase 1713 lipase 39








Review of Systems


ROS unobtainable: due to mental status





Past Medical History


Past Medical History: COPD, Dementia, Hyperlipidemia, Hypertension, Memory 

Impairment, Renal Disease


Additional Past Medical History / Comment(s): HIATAL HERNIA


History of Any Multi-Drug Resistant Organisms: None Reported


Past Surgical History: Bowel Resection


Additional Past Surgical History / Comment(s): LT CAROTID ENDARTERECTOMY.  

COLONOSCOPY.  BILAT CATARACTS REMOVED.  D & C.  EGD


Past Anesthesia/Blood Transfusion Reactions: No Reported Reaction


Past Psychological History: No Psychological Hx Reported


Smoking Status: Former smoker


Past Alcohol Use History: Rare


Past Drug Use History: None Reported





- Past Family History


  ** Father


Family Medical History: Cancer





Medications and Allergies


                                Home Medications











 Medication  Instructions  Recorded  Confirmed  Type


 


Clopidogrel [Plavix] 75 mg PO DAILY 02/03/15 05/05/22 History


 


Aspirin EC [Ecotrin Low Dose] 81 mg PO DAILY 01/22/21 05/05/22 History


 


Escitalopram Oxalate [Lexapro] 5 mg PO DAILY 01/22/21 05/05/22 History


 


Memantine HCl 10 mg PO BID 01/22/21 05/05/22 History


 


Cholecalciferol [Vitamin D3 (25 25 mcg PO DAILY 05/05/22 05/05/22 History





Mcg = 1000 Iu)]    


 


Ferrous Sulfate [Feosol] 325 mg PO DAILY 05/05/22 05/05/22 History


 


Ginkgo Biloba 1200mg 1 tab PO DAILY 05/05/22 05/05/22 History


 


Simvastatin 40 mg PO HS 05/05/22 05/05/22 History


 


amLODIPine [Norvasc] 5 mg PO DAILY 05/05/22 05/05/22 History


 


buPROPion HCL [Wellbutrin XL] 150 mg PO DAILY 05/05/22 05/05/22 History








                                    Allergies











Allergy/AdvReac Type Severity Reaction Status Date / Time


 


No Known Allergies Allergy   Verified 05/05/22 16:52














Physical Exam


Vitals: 


                                   Vital Signs











  Temp Pulse Pulse Resp BP BP Pulse Ox


 


 05/06/22 07:15     16   


 


 05/06/22 01:58  97.9 F   71  16   118/76  98


 


 05/05/22 19:35   75   18  163/81   99


 


 05/05/22 18:00   77   18  162/77   95


 


 05/05/22 16:23   74   16  131/78   96


 


 05/05/22 15:18  98.2 F  77   16  141/70   98








                                Intake and Output











 05/05/22 05/06/22 05/06/22





 22:59 06:59 14:59


 


Other:   


 


  # Voids  4 


 


  Weight 58.967 kg  














General appearance: The patient is alert, confused, oriented 1, appears in no 

acute distress.


HET: Head is normocephalic and atraumatic.  Conjunctiva pink.  Sclera deeply 

icteric.


Neck: Supple without lymphadenopathy.  Trachea midline.


Heart: S1 S2.  Regular rate and rhythm.


Lungs: Clear to auscultation.


Abdomen: Soft, nontender, nondistended with  bowel sounds.  No guarding or 

rigidity.


Skin:  No rashes.  Jaundice.


Extremities: Normal skin color and turgor.  No pedal edema.


Neurological: Confused, reported at baseline.  Oriented 1





Results


CBC & Chem 7: 


                                 05/06/22 07:15





                                 05/06/22 07:15


Labs: 


                  Abnormal Lab Results - Last 24 Hours (Table)











  05/05/22 05/05/22 05/06/22 Range/Units





  15:36 15:36 07:15 


 


RBC   3.69 L   (3.80-5.40)  m/uL


 


Hgb   10.7 L  11.0 L  (11.4-16.0)  gm/dL


 


MCHC   30.8 L  30.4 L  (31.0-37.0)  g/dL


 


Sodium  135 L    (137-145)  mmol/L


 


BUN  33 H    (7-17)  mg/dL


 


Creatinine  1.64 H    (0.52-1.04)  mg/dL


 


Glucose  339 H    (74-99)  mg/dL


 


Magnesium  2.7 H    (1.6-2.3)  mg/dL


 


Total Bilirubin  12.8 H    (0.2-1.3)  mg/dL


 


Conjugated Bilirubin  7.8 H    (0.0-0.3)  mg/dL


 


Unconjugated Bilirubin  1.8 H    (0.0-1.1)  mg/dL


 


Delta Bilirubin  3.2 H    (0.0-0.2)  mg/dL


 


AST  314 H    (14-36)  U/L


 


ALT  348 H    (4-34)  U/L


 


Alkaline Phosphatase  1585 H    ()  U/L














  05/06/22 Range/Units





  07:15 


 


RBC   (3.80-5.40)  m/uL


 


Hgb   (11.4-16.0)  gm/dL


 


MCHC   (31.0-37.0)  g/dL


 


Sodium   (137-145)  mmol/L


 


BUN  34 H  (7-17)  mg/dL


 


Creatinine  1.76 H  (0.52-1.04)  mg/dL


 


Glucose  357 H  (74-99)  mg/dL


 


Magnesium   (1.6-2.3)  mg/dL


 


Total Bilirubin  14.7 H  (0.2-1.3)  mg/dL


 


Conjugated Bilirubin   (0.0-0.3)  mg/dL


 


Unconjugated Bilirubin   (0.0-1.1)  mg/dL


 


Delta Bilirubin   (0.0-0.2)  mg/dL


 


AST  333 H  (14-36)  U/L


 


ALT  363 H  (4-34)  U/L


 


Alkaline Phosphatase  1713 H  ()  U/L











Comments: 





CT abdomen and pelvis show new intrahepatic and increased and extrahepatic 

biliary ductal dilation compared to prior centered around the hepatic head with 

ill-defined soft tissues.  Additionally there is similar dilation of the 

pancreatic duct which is suboptimally evaluated.  Correlate with serum lipase 

underlying superimposed pancreatitis.  Further evaluation of biliary system with

MRCP with IV contrast for further evaluation pancreatic head obstructing mass or

calculus.  A large stool burden throughout the colon, nonobstructing left renal 

calculus, and infrarenal aortic aneurysm dilation stable back to 2021








Assessment and Plan


(1) Biliary obstruction


Narrative/Plan: 


81-year-old female who was sent in by her PCP for jaundice.  Patient had a CT of

the abdomen and pelvis concerning for biliary obstruction.  Patient's labs were 

also consistent with biliary obstruction with total bilirubin of 14.7  

 alkaline phosphatase 1713.  Patient been afebrile.  Clinical picture  

likely biliary obstruction, recommend ERCP with EUS recommend patient to 

transfer to tertiary center with advanced gastroenterologist to perform 

ERCP/EUS.  Patient's  was called and this was discussed with him over the

phone.  Recommend transfer to tertiary center, he would prefer transfer to 

nearest hospital possibly [facility].  As of today there will also be drawn

tetralogy coverage here at this hospital for the next 1 week duration.


Current Visit: Yes   Status: Acute   Code(s): K83.1 - OBSTRUCTION OF BILE DUCT  

SNOMED Code(s): 063138490


   





(2) Jaundice


Current Visit: Yes   Status: Acute   Code(s): R17 - UNSPECIFIED JAUNDICE   

SNOMED Code(s): 81301590


   


Plan: 





1.  Continue symptomatic and supportive care


2.  Daily CBC, CMP


3.  Hold Plavix


4.  Recommend transfer to tertiary center with advanced 

endoscopist/gastroenterologist to perform ERCP/EUS


5.  Nursing to contact case management to initiate transfer





Thank you for allowing us to participate in the care of the patient, the GI 

service will sign off, gastroenterology will not be available at the hospital 

this weekend and through next week.  We recommend transfer to tertiary center.





Dr. CHANA Limon


I agree with the dictator's note, documented as a scribe by Eleanor RAMEY.

## 2022-05-06 NOTE — P.HPIM
History of Present Illness


H&P Date: 05/06/22


Chief Complaint: Jaundice, biliary obstruction





This is a 81-year-old female with past medical history of COPD, dementia, 

hypertension, hyperlipidemia, chronic kidney disease stage IV follows 

nephrologist in [place], presented to PCP, Dr. Huggins office yesterday with new 

onset jaundice worsening over the last 3 days, asymptomatic, denies abdominal 

pain, no nausea ,no vomiting and was directed to the ER.  Majority of 

information obtained from chart and Dr. Huggins PCP as patient alert and oriented 

1 and currently no family at bedside.  VSS. On admission ,hyperbilirubinemia 

with elevated LFTs.  CT of abdomen and pelvis reported new intrahepatic and 

increased and extrahepatic biliary ductal dilation compared to prior centered 

around the hepatic head with ill-defined soft tissues. Additionally there is 

similar dilation of the pancreatic duct which is suboptimally evaluated. 

Correlate with serum lipase underlying superimposed pancreatitis. Large stool 

burden throughout the colon, nonobstructing left renal calculus, and infrarenal 

aortic aneurysm dilation stable back to 2021.  Afebrile, WBC 6.9. Hemoglobin 

11.0 hematocrit 36 platelets 220, INR 1.1. Sodium 138 potassium 5.0 BUN 34 

creatinine 1.76, GFR 27, glucose 357, total bilirubin 14.7, conjugated bilirubin

7.8 unconjugated bilirubin 1.8   alkaline phosphatase 1713, lipase

39, ammonia 12.  GI consult in place.














Review of Systems





Unable to obtain Review of Systems due to patient's dementia/mental state.





Past Medical History


Past Medical History: COPD, Dementia, Hyperlipidemia, Hypertension, Memory 

Impairment, Renal Disease


Additional Past Medical History / Comment(s): HIATAL HERNIA


History of Any Multi-Drug Resistant Organisms: None Reported


Past Surgical History: Bowel Resection


Additional Past Surgical History / Comment(s): LT CAROTID ENDARTERECTOMY.  

COLONOSCOPY.  BILAT CATARACTS REMOVED.  D & C.  EGD


Past Anesthesia/Blood Transfusion Reactions: No Reported Reaction


Past Psychological History: No Psychological Hx Reported


Smoking Status: Former smoker


Past Alcohol Use History: Rare


Past Drug Use History: None Reported





- Past Family History


  ** Father


Family Medical History: Cancer





Medications and Allergies


                                Home Medications











 Medication  Instructions  Recorded  Confirmed  Type


 


Clopidogrel [Plavix] 75 mg PO DAILY 02/03/15 05/05/22 History


 


Aspirin EC [Ecotrin Low Dose] 81 mg PO DAILY 01/22/21 05/05/22 History


 


Escitalopram Oxalate [Lexapro] 5 mg PO DAILY 01/22/21 05/05/22 History


 


Memantine HCl 10 mg PO BID 01/22/21 05/05/22 History


 


Cholecalciferol [Vitamin D3 (25 25 mcg PO DAILY 05/05/22 05/05/22 History





Mcg = 1000 Iu)]    


 


Ferrous Sulfate [Feosol] 325 mg PO DAILY 05/05/22 05/05/22 History


 


Ginkgo Biloba 1200mg 1 tab PO DAILY 05/05/22 05/05/22 History


 


Simvastatin 40 mg PO HS 05/05/22 05/05/22 History


 


amLODIPine [Norvasc] 5 mg PO DAILY 05/05/22 05/05/22 History


 


buPROPion HCL [Wellbutrin XL] 150 mg PO DAILY 05/05/22 05/05/22 History








                                    Allergies











Allergy/AdvReac Type Severity Reaction Status Date / Time


 


No Known Allergies Allergy   Verified 05/05/22 16:52














Physical Exam


Vitals: 


                                   Vital Signs











  Temp Pulse Pulse Resp BP BP Pulse Ox


 


 05/06/22 08:00  98.0 F   72  16   122/72  98


 


 05/06/22 07:15     16   


 


 05/06/22 01:58  97.9 F   71  16   118/76  98


 


 05/05/22 19:35   75   18  163/81   99


 


 05/05/22 18:00   77   18  162/77   95


 


 05/05/22 16:23   74   16  131/78   96


 


 05/05/22 15:18  98.2 F  77   16  141/70   98








                                Intake and Output











 05/05/22 05/06/22 05/06/22





 22:59 06:59 14:59


 


Other:   


 


  # Voids  4 


 


  Weight 58.967 kg  











PHYSICAL EXAM:


VITAL SIGNS: [As above]


GENERAL: Sitting up in bed, alert and oriented 1, to person only, no acute 

distress, jaundiced


HEENT: Atraumatic, normocephalic, Conjunctivae normal.  Sclera icteric


NECK: Supple No JVD. No thyroid enlargement. No LNs


CARDIOVASCULAR:  S1, S2 regular. No murmur


RESPIRATION: Breath sounds diminished in the bases. No rhonchi or crackles. No 

bronchial breathing.


ABDOMEN:  Soft, nondistended, nontender . No guarding. no masses palpable. No 

ascites, No organomegaly.positive Bowel sounds heard.


LEGS:  No edema. no swelling 


NERVOUS SYSTEM/Psychiatry: Calm, cooperative, Confused, alert and oriented to 

person only-Baseline.


Skin: jaundice, no rashes, warm and dry.














Results


CBC & Chem 7: 


                                 05/06/22 07:15





                                 05/06/22 07:15


Labs: 


                  Abnormal Lab Results - Last 24 Hours (Table)











  05/05/22 05/05/22 05/06/22 Range/Units





  15:36 15:36 07:15 


 


RBC   3.69 L   (3.80-5.40)  m/uL


 


Hgb   10.7 L  11.0 L  (11.4-16.0)  gm/dL


 


MCHC   30.8 L  30.4 L  (31.0-37.0)  g/dL


 


Sodium  135 L    (137-145)  mmol/L


 


BUN  33 H    (7-17)  mg/dL


 


Creatinine  1.64 H    (0.52-1.04)  mg/dL


 


Glucose  339 H    (74-99)  mg/dL


 


Magnesium  2.7 H    (1.6-2.3)  mg/dL


 


Total Bilirubin  12.8 H    (0.2-1.3)  mg/dL


 


Conjugated Bilirubin  7.8 H    (0.0-0.3)  mg/dL


 


Unconjugated Bilirubin  1.8 H    (0.0-1.1)  mg/dL


 


Delta Bilirubin  3.2 H    (0.0-0.2)  mg/dL


 


AST  314 H    (14-36)  U/L


 


ALT  348 H    (4-34)  U/L


 


Alkaline Phosphatase  1585 H    ()  U/L














  05/06/22 Range/Units





  07:15 


 


RBC   (3.80-5.40)  m/uL


 


Hgb   (11.4-16.0)  gm/dL


 


MCHC   (31.0-37.0)  g/dL


 


Sodium   (137-145)  mmol/L


 


BUN  34 H  (7-17)  mg/dL


 


Creatinine  1.76 H  (0.52-1.04)  mg/dL


 


Glucose  357 H  (74-99)  mg/dL


 


Magnesium   (1.6-2.3)  mg/dL


 


Total Bilirubin  14.7 H  (0.2-1.3)  mg/dL


 


Conjugated Bilirubin   (0.0-0.3)  mg/dL


 


Unconjugated Bilirubin   (0.0-1.1)  mg/dL


 


Delta Bilirubin   (0.0-0.2)  mg/dL


 


AST  333 H  (14-36)  U/L


 


ALT  363 H  (4-34)  U/L


 


Alkaline Phosphatase  1713 H  ()  U/L














Assessment and Plan


Assessment: 


Biliary Obstruction, in a patient with new onset asymptomatic jaundice.  

Evaluated by GI, recommending ERCP with EUS.  Patient will require transfer to a

tertiary center to obtain ERCP with [facility] which is not performed at this site.  

Additionally, there will not be GI service available this weekend or next week. 

Patient's  requesting transfer to [facility].














The impression and plan of care has been dictated as directed.





:


I performed a history and examination of this patient,  discussed the same with 

the dictator.  I agree with the dictator's note ,documented as a scribe.  Any 

additional findings or plans will be noted.

## 2022-05-07 VITALS
HEART RATE: 76 BPM | DIASTOLIC BLOOD PRESSURE: 67 MMHG | TEMPERATURE: 98 F | RESPIRATION RATE: 17 BRPM | SYSTOLIC BLOOD PRESSURE: 113 MMHG

## 2022-05-07 RX ADMIN — CEFAZOLIN SCH: 330 INJECTION, POWDER, FOR SOLUTION INTRAMUSCULAR; INTRAVENOUS at 01:29

## 2022-05-20 ENCOUNTER — HOSPITAL ENCOUNTER (INPATIENT)
Dept: HOSPITAL 47 - EC | Age: 82
LOS: 12 days | Discharge: HOME | DRG: 871 | End: 2022-06-01
Attending: FAMILY MEDICINE | Admitting: FAMILY MEDICINE
Payer: MEDICARE

## 2022-05-20 VITALS — BODY MASS INDEX: 22.8 KG/M2

## 2022-05-20 DIAGNOSIS — Z51.5: ICD-10-CM

## 2022-05-20 DIAGNOSIS — N39.0: ICD-10-CM

## 2022-05-20 DIAGNOSIS — Z90.49: ICD-10-CM

## 2022-05-20 DIAGNOSIS — T50.3X5A: ICD-10-CM

## 2022-05-20 DIAGNOSIS — L89.510: ICD-10-CM

## 2022-05-20 DIAGNOSIS — K83.1: ICD-10-CM

## 2022-05-20 DIAGNOSIS — Z80.9: ICD-10-CM

## 2022-05-20 DIAGNOSIS — Z98.41: ICD-10-CM

## 2022-05-20 DIAGNOSIS — Z98.890: ICD-10-CM

## 2022-05-20 DIAGNOSIS — C77.2: ICD-10-CM

## 2022-05-20 DIAGNOSIS — A41.59: Primary | ICD-10-CM

## 2022-05-20 DIAGNOSIS — Z79.899: ICD-10-CM

## 2022-05-20 DIAGNOSIS — E87.0: ICD-10-CM

## 2022-05-20 DIAGNOSIS — Z66: ICD-10-CM

## 2022-05-20 DIAGNOSIS — R32: ICD-10-CM

## 2022-05-20 DIAGNOSIS — Z79.82: ICD-10-CM

## 2022-05-20 DIAGNOSIS — C25.9: ICD-10-CM

## 2022-05-20 DIAGNOSIS — G93.41: ICD-10-CM

## 2022-05-20 DIAGNOSIS — N17.9: ICD-10-CM

## 2022-05-20 DIAGNOSIS — D64.9: ICD-10-CM

## 2022-05-20 DIAGNOSIS — R17: ICD-10-CM

## 2022-05-20 DIAGNOSIS — J44.9: ICD-10-CM

## 2022-05-20 DIAGNOSIS — R65.20: ICD-10-CM

## 2022-05-20 DIAGNOSIS — E78.5: ICD-10-CM

## 2022-05-20 DIAGNOSIS — I10: ICD-10-CM

## 2022-05-20 DIAGNOSIS — Z98.42: ICD-10-CM

## 2022-05-20 DIAGNOSIS — F03.90: ICD-10-CM

## 2022-05-20 DIAGNOSIS — Z79.02: ICD-10-CM

## 2022-05-20 DIAGNOSIS — Z16.12: ICD-10-CM

## 2022-05-20 DIAGNOSIS — R73.9: ICD-10-CM

## 2022-05-20 DIAGNOSIS — E86.0: ICD-10-CM

## 2022-05-20 DIAGNOSIS — Z85.07: ICD-10-CM

## 2022-05-20 LAB
ALBUMIN SERPL-MCNC: 4.3 G/DL (ref 3.5–5)
ALP SERPL-CCNC: 1264 U/L (ref 38–126)
ALT SERPL-CCNC: 364 U/L (ref 4–34)
ANION GAP SERPL CALC-SCNC: 10 MMOL/L
APTT BLD: 20.1 SEC (ref 22–30)
AST SERPL-CCNC: 624 U/L (ref 14–36)
BASOPHILS # BLD AUTO: 0.1 K/UL (ref 0–0.2)
BASOPHILS NFR BLD AUTO: 1 %
BUN SERPL-SCNC: 34 MG/DL (ref 7–17)
CALCIUM SPEC-MCNC: 10 MG/DL (ref 8.4–10.2)
CHLORIDE SERPL-SCNC: 101 MMOL/L (ref 98–107)
CO2 SERPL-SCNC: 26 MMOL/L (ref 22–30)
EOSINOPHIL # BLD AUTO: 0.2 K/UL (ref 0–0.7)
EOSINOPHIL NFR BLD AUTO: 1 %
ERYTHROCYTE [DISTWIDTH] IN BLOOD BY AUTOMATED COUNT: 3.6 M/UL (ref 3.8–5.4)
ERYTHROCYTE [DISTWIDTH] IN BLOOD: 16.7 % (ref 11.5–15.5)
GLUCOSE SERPL-MCNC: 245 MG/DL (ref 74–99)
GLUCOSE UR QL: (no result)
HCT VFR BLD AUTO: 33 % (ref 34–46)
HGB BLD-MCNC: 10.8 GM/DL (ref 11.4–16)
INR PPP: 1 (ref ?–1.2)
LYMPHOCYTES # SPEC AUTO: 0.2 K/UL (ref 1–4.8)
LYMPHOCYTES NFR SPEC AUTO: 1 %
MAGNESIUM SPEC-SCNC: 2.6 MG/DL (ref 1.6–2.3)
MCH RBC QN AUTO: 30 PG (ref 25–35)
MCHC RBC AUTO-ENTMCNC: 32.7 G/DL (ref 31–37)
MCV RBC AUTO: 91.6 FL (ref 80–100)
MONOCYTES # BLD AUTO: 0.6 K/UL (ref 0–1)
MONOCYTES NFR BLD AUTO: 2 %
NEUTROPHILS # BLD AUTO: 22.9 K/UL (ref 1.3–7.7)
NEUTROPHILS NFR BLD AUTO: 95 %
PH UR: 6.5 [PH] (ref 5–8)
PLATELET # BLD AUTO: 368 K/UL (ref 150–450)
POTASSIUM SERPL-SCNC: 5.5 MMOL/L (ref 3.5–5.1)
PROT SERPL-MCNC: 7.5 G/DL (ref 6.3–8.2)
PT BLD: 10.9 SEC (ref 9–12)
RBC UR QL: 3 /HPF (ref 0–5)
SODIUM SERPL-SCNC: 137 MMOL/L (ref 137–145)
SP GR UR: 1.01 (ref 1–1.03)
SQUAMOUS UR QL AUTO: 4 /HPF (ref 0–4)
UROBILINOGEN UR QL STRIP: <2 MG/DL (ref ?–2)
WBC # BLD AUTO: 24 K/UL (ref 3.8–10.6)
WBC # UR AUTO: 69 /HPF (ref 0–5)

## 2022-05-20 PROCEDURE — 85730 THROMBOPLASTIN TIME PARTIAL: CPT

## 2022-05-20 PROCEDURE — 83690 ASSAY OF LIPASE: CPT

## 2022-05-20 PROCEDURE — 83540 ASSAY OF IRON: CPT

## 2022-05-20 PROCEDURE — 36415 COLL VENOUS BLD VENIPUNCTURE: CPT

## 2022-05-20 PROCEDURE — 71260 CT THORAX DX C+: CPT

## 2022-05-20 PROCEDURE — 83735 ASSAY OF MAGNESIUM: CPT

## 2022-05-20 PROCEDURE — 85610 PROTHROMBIN TIME: CPT

## 2022-05-20 PROCEDURE — 85025 COMPLETE CBC W/AUTO DIFF WBC: CPT

## 2022-05-20 PROCEDURE — 83921 ORGANIC ACID SINGLE QUANT: CPT

## 2022-05-20 PROCEDURE — 93005 ELECTROCARDIOGRAM TRACING: CPT

## 2022-05-20 PROCEDURE — 86880 COOMBS TEST DIRECT: CPT

## 2022-05-20 PROCEDURE — 86301 IMMUNOASSAY TUMOR CA 19-9: CPT

## 2022-05-20 PROCEDURE — 83550 IRON BINDING TEST: CPT

## 2022-05-20 PROCEDURE — 87040 BLOOD CULTURE FOR BACTERIA: CPT

## 2022-05-20 PROCEDURE — 82728 ASSAY OF FERRITIN: CPT

## 2022-05-20 PROCEDURE — 80053 COMPREHEN METABOLIC PANEL: CPT

## 2022-05-20 PROCEDURE — 81001 URINALYSIS AUTO W/SCOPE: CPT

## 2022-05-20 PROCEDURE — 83615 LACTATE (LD) (LDH) ENZYME: CPT

## 2022-05-20 PROCEDURE — 83036 HEMOGLOBIN GLYCOSYLATED A1C: CPT

## 2022-05-20 PROCEDURE — 86920 COMPATIBILITY TEST SPIN: CPT

## 2022-05-20 PROCEDURE — 87077 CULTURE AEROBIC IDENTIFY: CPT

## 2022-05-20 PROCEDURE — 87086 URINE CULTURE/COLONY COUNT: CPT

## 2022-05-20 PROCEDURE — 82607 VITAMIN B-12: CPT

## 2022-05-20 PROCEDURE — 74177 CT ABD & PELVIS W/CONTRAST: CPT

## 2022-05-20 PROCEDURE — 80048 BASIC METABOLIC PNL TOTAL CA: CPT

## 2022-05-20 PROCEDURE — 86140 C-REACTIVE PROTEIN: CPT

## 2022-05-20 PROCEDURE — 71046 X-RAY EXAM CHEST 2 VIEWS: CPT

## 2022-05-20 PROCEDURE — 86901 BLOOD TYPING SEROLOGIC RH(D): CPT

## 2022-05-20 PROCEDURE — 86900 BLOOD TYPING SEROLOGIC ABO: CPT

## 2022-05-20 PROCEDURE — 82746 ASSAY OF FOLIC ACID SERUM: CPT

## 2022-05-20 PROCEDURE — 83605 ASSAY OF LACTIC ACID: CPT

## 2022-05-20 PROCEDURE — 87186 SC STD MICRODIL/AGAR DIL: CPT

## 2022-05-20 PROCEDURE — 82306 VITAMIN D 25 HYDROXY: CPT

## 2022-05-20 PROCEDURE — 86850 RBC ANTIBODY SCREEN: CPT

## 2022-05-20 PROCEDURE — 84484 ASSAY OF TROPONIN QUANT: CPT

## 2022-05-20 NOTE — ED
General Adult HPI





- General


Chief complaint: Weakness


Stated complaint: Vomiting/dehydrated


Time Seen by Provider: 05/20/22 13:40


Source: patient, family, RN notes reviewed, old records reviewed


Mode of arrival: wheelchair


Limitations: altered mental status





- History of Present Illness


Initial comments: 





This is a 81-year-old female presents emergency Department complaining of 

weakness.  Patient has pancreatic cancer she recently went down and [facility] 

had a stent placed according to the  and since then she's not been eating

or drinking and this morning she vomited.  Patient is also a little more altered

cording to the .  She has dementia as a baseline but he states that she 

is a little less responsive and understands a little less.  Patient has had no 

diarrhea.  Patient has had no fever chills patient denies any shortness of br

eath or difficulty breathing or chest pain.   states the patient 

occasionally complains of some abdominal pain but has not had any today.  #1 

reason why he's bring her here because she is just weak overall.





- Related Data


                                Home Medications











 Medication  Instructions  Recorded  Confirmed


 


Clopidogrel [Plavix] 75 mg PO DAILY 02/03/15 05/20/22


 


Aspirin EC [Ecotrin Low Dose] 81 mg PO DAILY 01/22/21 05/20/22


 


Memantine HCl 10 mg PO BID 01/22/21 05/20/22


 


Cholecalciferol [Vitamin D3 (25 25 mcg PO DAILY 05/05/22 05/20/22





Mcg = 1000 Iu)]   


 


Ferrous Sulfate [Feosol] 325 mg PO DAILY 05/05/22 05/20/22


 


Ginkgo Biloba 1200mg 1 tab PO DAILY 05/05/22 05/20/22


 


Simvastatin 40 mg PO HS 05/05/22 05/20/22


 


amLODIPine [Norvasc] 5 mg PO DAILY 05/05/22 05/20/22


 


buPROPion HCL [Wellbutrin XL] 150 mg PO DAILY 05/05/22 05/20/22


 


Escitalopram [Lexapro] 5 mg PO DAILY 05/20/22 05/20/22


 


Furosemide [Lasix] 40 mg PO MOTUWETHFR 05/20/22 05/20/22


 


Magnesium Hydroxide [Milk of 2,400 mg PO HS 05/20/22 05/20/22





Magnesia]   











                                    Allergies











Allergy/AdvReac Type Severity Reaction Status Date / Time


 


No Known Allergies Allergy   Verified 05/20/22 12:57














Review of Systems


ROS Statement: 


Those systems with pertinent positive or pertinent negative responses have been 

documented in the HPI.





ROS Other: All systems not noted in ROS Statement are negative.





Past Medical History


Past Medical History: COPD, Dementia, Hyperlipidemia, Hypertension, Memory 

Impairment, Renal Disease


Additional Past Medical History / Comment(s): HIATAL HERNIA


History of Any Multi-Drug Resistant Organisms: None Reported


Past Surgical History: Bowel Resection


Additional Past Surgical History / Comment(s): LT CAROTID ENDARTERECTOMY.  

COLONOSCOPY.  BILAT CATARACTS REMOVED.  D & C.  EGD


Past Anesthesia/Blood Transfusion Reactions: No Reported Reaction


Past Psychological History: No Psychological Hx Reported


Smoking Status: Former smoker


Past Alcohol Use History: Rare


Past Drug Use History: None Reported





- Past Family History


  ** Father


Family Medical History: Cancer





General Exam





- General Exam Comments


Initial Comments: 





GENERAL:


Patient is well-developed and well-nourished.  Patient is nontoxic and well-h

ydrated and is in mild distress.





ENT:


Neck is soft and supple.  No significant lymphadenopathy is noted.  Oropharynx 

is clear.  Moist mucous membranes.  Neck has full range of motion without 

eliciting any pain.  





EYES:


The sclera were anicteric and conjunctiva were pink and moist.  Extraocular mov

ements were intact and pupils were equal round and reactive to light.  Eyelids 

were unremarkable.





PULMONARY:


Unlabored respirations.  Good breath sounds bilaterally.  No audible rales 

rhonchi or wheezing was noted.





CARDIOVASCULAR:


There is a regular rate and rhythm without any murmurs gallops or rubs. 





ABDOMEN:


Soft and nontender with normal bowel sounds. 





SKIN:


Skin is clear with no lesions or rashes and otherwise unremarkable.





NEUROLOGIC:


Patient is alert and oriented 2.  Cranial nerves II through XII are grossly 

intact.  Motor and sensory are also intact.  Normal speech, volume and content. 

Symmetrical smile.  





MUSCULOSKELETAL:


Normal extremities with adequate strength and full range of motion. 





LYMPHATICS:


No significant lymphadenopathy is noted





PSYCHIATRIC:


Unable secondary to dementia


Limitations: altered mental status





Course


                                   Vital Signs











  05/20/22 05/20/22 05/20/22





  12:50 16:08 18:17


 


Temperature 98.3 F  


 


Pulse Rate 94 96 96


 


Respiratory 18 20 20





Rate   


 


Blood Pressure 149/74 167/80 152/71


 


O2 Sat by Pulse 97 98 95





Oximetry   














Medical Decision Making





- Medical Decision Making





EKG shows sinus rhythm at 96 bpm PA interval is 229 QRS is 91 Q-T intervals 383 

QTC is 436 per patient's EKG shows no ST segment elevation or depression.





Patient urinary tract infection patient received 2 g of Rocephin.  Patient also 

received 1.5 L of normal saline.





I spoke with the patient [place] hospitalist agreed to admit the patient 

admitted the patient wrote admitting orders.





- Lab Data


Result diagrams: 


                                 05/20/22 13:53





                                 05/20/22 13:53


                                   Lab Results











  05/20/22 05/20/22 05/20/22 Range/Units





  13:53 13:53 13:53 


 


WBC  24.0 H    (3.8-10.6)  k/uL


 


RBC  3.60 L    (3.80-5.40)  m/uL


 


Hgb  10.8 L    (11.4-16.0)  gm/dL


 


Hct  33.0 L    (34.0-46.0)  %


 


MCV  91.6    (80.0-100.0)  fL


 


MCH  30.0    (25.0-35.0)  pg


 


MCHC  32.7    (31.0-37.0)  g/dL


 


RDW  16.7 H    (11.5-15.5)  %


 


Plt Count  368    (150-450)  k/uL


 


MPV  9.1    


 


Neutrophils %  95    %


 


Lymphocytes %  1    %


 


Monocytes %  2    %


 


Eosinophils %  1    %


 


Basophils %  1    %


 


Neutrophils #  22.9 H    (1.3-7.7)  k/uL


 


Lymphocytes #  0.2 L    (1.0-4.8)  k/uL


 


Monocytes #  0.6    (0-1.0)  k/uL


 


Eosinophils #  0.2    (0-0.7)  k/uL


 


Basophils #  0.1    (0-0.2)  k/uL


 


Anisocytosis  Slight    


 


PT   10.9   (9.0-12.0)  sec


 


INR   1.0   (<1.2)  


 


APTT   20.1 L   (22.0-30.0)  sec


 


Sodium    137  (137-145)  mmol/L


 


Potassium    5.5 H  (3.5-5.1)  mmol/L


 


Chloride    101  ()  mmol/L


 


Carbon Dioxide    26  (22-30)  mmol/L


 


Anion Gap    10  mmol/L


 


BUN    34 H  (7-17)  mg/dL


 


Creatinine    1.54 H  (0.52-1.04)  mg/dL


 


Est GFR (CKD-EPI)AfAm    37  (>60 ml/min/1.73 sqM)  


 


Est GFR (CKD-EPI)NonAf    32  (>60 ml/min/1.73 sqM)  


 


Glucose    245 H  (74-99)  mg/dL


 


Lactic Ac Sepsis Rflx     


 


Plasma Lactic Acid George     (0.7-2.0)  mmol/L


 


Calcium    10.0  (8.4-10.2)  mg/dL


 


Magnesium    2.6 H  (1.6-2.3)  mg/dL


 


Total Bilirubin    6.2 H  (0.2-1.3)  mg/dL


 


AST    624 H  (14-36)  U/L


 


ALT    364 H  (4-34)  U/L


 


Alkaline Phosphatase    1264 H  ()  U/L


 


Troponin I     (0.000-0.034)  ng/mL


 


Total Protein    7.5  (6.3-8.2)  g/dL


 


Albumin    4.3  (3.5-5.0)  g/dL


 


Lipase     ()  U/L


 


Urine Color     


 


Urine Appearance     (Clear)  


 


Urine pH     (5.0-8.0)  


 


Ur Specific Gravity     (1.001-1.035)  


 


Urine Protein     (Negative)  


 


Urine Glucose (UA)     (Negative)  


 


Urine Ketones     (Negative)  


 


Urine Blood     (Negative)  


 


Urine Nitrite     (Negative)  


 


Urine Bilirubin     (Negative)  


 


Urine Urobilinogen     (<2.0)  mg/dL


 


Ur Leukocyte Esterase     (Negative)  


 


Urine RBC     (0-5)  /hpf


 


Urine WBC     (0-5)  /hpf


 


Ur Squamous Epith Cells     (0-4)  /hpf


 


Urine Bacteria     (None)  /hpf


 


Urine Mucus     (None)  /hpf














  05/20/22 05/20/22 05/20/22 Range/Units





  13:53 13:53 13:53 


 


WBC     (3.8-10.6)  k/uL


 


RBC     (3.80-5.40)  m/uL


 


Hgb     (11.4-16.0)  gm/dL


 


Hct     (34.0-46.0)  %


 


MCV     (80.0-100.0)  fL


 


MCH     (25.0-35.0)  pg


 


MCHC     (31.0-37.0)  g/dL


 


RDW     (11.5-15.5)  %


 


Plt Count     (150-450)  k/uL


 


MPV     


 


Neutrophils %     %


 


Lymphocytes %     %


 


Monocytes %     %


 


Eosinophils %     %


 


Basophils %     %


 


Neutrophils #     (1.3-7.7)  k/uL


 


Lymphocytes #     (1.0-4.8)  k/uL


 


Monocytes #     (0-1.0)  k/uL


 


Eosinophils #     (0-0.7)  k/uL


 


Basophils #     (0-0.2)  k/uL


 


Anisocytosis     


 


PT     (9.0-12.0)  sec


 


INR     (<1.2)  


 


APTT     (22.0-30.0)  sec


 


Sodium     (137-145)  mmol/L


 


Potassium     (3.5-5.1)  mmol/L


 


Chloride     ()  mmol/L


 


Carbon Dioxide     (22-30)  mmol/L


 


Anion Gap     mmol/L


 


BUN     (7-17)  mg/dL


 


Creatinine     (0.52-1.04)  mg/dL


 


Est GFR (CKD-EPI)AfAm     (>60 ml/min/1.73 sqM)  


 


Est GFR (CKD-EPI)NonAf     (>60 ml/min/1.73 sqM)  


 


Glucose     (74-99)  mg/dL


 


Lactic Ac Sepsis Rflx     


 


Plasma Lactic Acid George  2.3 H*    (0.7-2.0)  mmol/L


 


Calcium     (8.4-10.2)  mg/dL


 


Magnesium     (1.6-2.3)  mg/dL


 


Total Bilirubin     (0.2-1.3)  mg/dL


 


AST     (14-36)  U/L


 


ALT     (4-34)  U/L


 


Alkaline Phosphatase     ()  U/L


 


Troponin I   <0.012   (0.000-0.034)  ng/mL


 


Total Protein     (6.3-8.2)  g/dL


 


Albumin     (3.5-5.0)  g/dL


 


Lipase    50  ()  U/L


 


Urine Color     


 


Urine Appearance     (Clear)  


 


Urine pH     (5.0-8.0)  


 


Ur Specific Gravity     (1.001-1.035)  


 


Urine Protein     (Negative)  


 


Urine Glucose (UA)     (Negative)  


 


Urine Ketones     (Negative)  


 


Urine Blood     (Negative)  


 


Urine Nitrite     (Negative)  


 


Urine Bilirubin     (Negative)  


 


Urine Urobilinogen     (<2.0)  mg/dL


 


Ur Leukocyte Esterase     (Negative)  


 


Urine RBC     (0-5)  /hpf


 


Urine WBC     (0-5)  /hpf


 


Ur Squamous Epith Cells     (0-4)  /hpf


 


Urine Bacteria     (None)  /hpf


 


Urine Mucus     (None)  /hpf














  05/20/22 05/20/22 Range/Units





  14:30 18:13 


 


WBC    (3.8-10.6)  k/uL


 


RBC    (3.80-5.40)  m/uL


 


Hgb    (11.4-16.0)  gm/dL


 


Hct    (34.0-46.0)  %


 


MCV    (80.0-100.0)  fL


 


MCH    (25.0-35.0)  pg


 


MCHC    (31.0-37.0)  g/dL


 


RDW    (11.5-15.5)  %


 


Plt Count    (150-450)  k/uL


 


MPV    


 


Neutrophils %    %


 


Lymphocytes %    %


 


Monocytes %    %


 


Eosinophils %    %


 


Basophils %    %


 


Neutrophils #    (1.3-7.7)  k/uL


 


Lymphocytes #    (1.0-4.8)  k/uL


 


Monocytes #    (0-1.0)  k/uL


 


Eosinophils #    (0-0.7)  k/uL


 


Basophils #    (0-0.2)  k/uL


 


Anisocytosis    


 


PT    (9.0-12.0)  sec


 


INR    (<1.2)  


 


APTT    (22.0-30.0)  sec


 


Sodium    (137-145)  mmol/L


 


Potassium    (3.5-5.1)  mmol/L


 


Chloride    ()  mmol/L


 


Carbon Dioxide    (22-30)  mmol/L


 


Anion Gap    mmol/L


 


BUN    (7-17)  mg/dL


 


Creatinine    (0.52-1.04)  mg/dL


 


Est GFR (CKD-EPI)AfAm    (>60 ml/min/1.73 sqM)  


 


Est GFR (CKD-EPI)NonAf    (>60 ml/min/1.73 sqM)  


 


Glucose    (74-99)  mg/dL


 


Lactic Ac Sepsis Rflx  Y   


 


Plasma Lactic Acid George    (0.7-2.0)  mmol/L


 


Calcium    (8.4-10.2)  mg/dL


 


Magnesium    (1.6-2.3)  mg/dL


 


Total Bilirubin    (0.2-1.3)  mg/dL


 


AST    (14-36)  U/L


 


ALT    (4-34)  U/L


 


Alkaline Phosphatase    ()  U/L


 


Troponin I    (0.000-0.034)  ng/mL


 


Total Protein    (6.3-8.2)  g/dL


 


Albumin    (3.5-5.0)  g/dL


 


Lipase    ()  U/L


 


Urine Color   Light Yellow  


 


Urine Appearance   Cloudy H  (Clear)  


 


Urine pH   6.5  (5.0-8.0)  


 


Ur Specific Gravity   1.007  (1.001-1.035)  


 


Urine Protein   Negative  (Negative)  


 


Urine Glucose (UA)   2+ H  (Negative)  


 


Urine Ketones   Negative  (Negative)  


 


Urine Blood   Large H  (Negative)  


 


Urine Nitrite   Positive H  (Negative)  


 


Urine Bilirubin   Negative  (Negative)  


 


Urine Urobilinogen   <2.0  (<2.0)  mg/dL


 


Ur Leukocyte Esterase   Large H  (Negative)  


 


Urine RBC   3  (0-5)  /hpf


 


Urine WBC   69 H  (0-5)  /hpf


 


Ur Squamous Epith Cells   4  (0-4)  /hpf


 


Urine Bacteria   Rare H  (None)  /hpf


 


Urine Mucus   Rare H  (None)  /hpf














Disposition


Clinical Impression: 


 Urinary tract infection, Weakness, History of pancreatic cancer





Disposition: ADMITTED AS IP TO THIS HOSP


Referrals: 


Maximilian Huggins DO [Primary Care Provider] - 1-2 days


Time of Disposition: 19:05

## 2022-05-20 NOTE — XR
EXAMINATION TYPE: XR chest 2V

 

DATE OF EXAM: 5/20/2022

 

COMPARISON: NONE

 

HISTORY: Shortness of breath

 

TECHNIQUE:  Frontal and lateral views of the chest are obtained.

 

FINDINGS:

 

Scattered senescent parenchymal changes noted. Hyperinflation compatible with COPD. 

 

No evidence for infiltrate. No evidence for atelectasis.

 

Heart size is stable.

 

Mediastinal structures are stable and grossly unremarkable.

 

No evidence for hilar prominence.

 

Degenerative changes dorsal spine. 

 

IMPRESSION:

1. No evidence for acute pulmonary disease.

## 2022-05-21 LAB
ALBUMIN SERPL-MCNC: 3 G/DL (ref 3.5–5)
ALBUMIN/GLOB SERPL: 1.2 {RATIO}
ALP SERPL-CCNC: 701 U/L (ref 38–126)
ALT SERPL-CCNC: 264 U/L (ref 4–34)
ANION GAP SERPL CALC-SCNC: 5 MMOL/L
AST SERPL-CCNC: 292 U/L (ref 14–36)
BASOPHILS # BLD AUTO: 0.19 X 10*3/UL (ref 0–0.1)
BASOPHILS NFR BLD AUTO: 0.8 %
BUN SERPL-SCNC: 24 MG/DL (ref 7–17)
CALCIUM SPEC-MCNC: 8.8 MG/DL (ref 8.4–10.2)
CHLORIDE SERPL-SCNC: 106 MMOL/L (ref 98–107)
CO2 SERPL-SCNC: 28 MMOL/L (ref 22–30)
EOSINOPHIL # BLD AUTO: 0.18 X 10*3/UL (ref 0.04–0.35)
EOSINOPHIL NFR BLD AUTO: 0.8 %
ERYTHROCYTE [DISTWIDTH] IN BLOOD BY AUTOMATED COUNT: 2.78 X 10*6/UL (ref 4.1–5.2)
ERYTHROCYTE [DISTWIDTH] IN BLOOD: 15.2 % (ref 11.5–14.5)
GLOBULIN SER CALC-MCNC: 2.6 G/DL
GLUCOSE SERPL-MCNC: 131 MG/DL (ref 74–99)
HCT VFR BLD AUTO: 26.1 % (ref 37.2–46.3)
HGB BLD-MCNC: 8.1 G/DL (ref 12–15)
IMM GRANULOCYTES BLD QL AUTO: 1.4 %
LYMPHOCYTES # SPEC AUTO: 1.61 X 10*3/UL (ref 0.9–5)
LYMPHOCYTES NFR SPEC AUTO: 6.9 %
MCH RBC QN AUTO: 29.1 PG (ref 27–32)
MCHC RBC AUTO-ENTMCNC: 31 G/DL (ref 32–37)
MCV RBC AUTO: 93.9 FL (ref 80–97)
MONOCYTES # BLD AUTO: 1.46 X 10*3/UL (ref 0.2–1)
MONOCYTES NFR BLD AUTO: 6.2 %
NEUTROPHILS # BLD AUTO: 19.68 X 10*3/UL (ref 1.8–7.7)
NEUTROPHILS NFR BLD AUTO: 83.9 %
NRBC BLD AUTO-RTO: 0 /100 WBCS (ref 0–0)
PLATELET # BLD AUTO: 259 X 10*3/UL (ref 140–440)
POTASSIUM SERPL-SCNC: 3.9 MMOL/L (ref 3.5–5.1)
PROT SERPL-MCNC: 5.6 G/DL (ref 6.3–8.2)
SODIUM SERPL-SCNC: 139 MMOL/L (ref 137–145)
WBC # BLD AUTO: 23.44 X 10*3/UL (ref 4.5–10)

## 2022-05-21 RX ADMIN — CEFAZOLIN SCH MLS/HR: 330 INJECTION, POWDER, FOR SOLUTION INTRAMUSCULAR; INTRAVENOUS at 11:34

## 2022-05-21 RX ADMIN — MEMANTINE HYDROCHLORIDE SCH MG: 5 TABLET ORAL at 20:23

## 2022-05-21 RX ADMIN — HEPARIN SODIUM SCH UNIT: 5000 INJECTION INTRAVENOUS; SUBCUTANEOUS at 16:17

## 2022-05-21 NOTE — P.HPIM
History of Present Illness


H&P Date: 05/21/22


Chief Complaint: Weakness





Patient is a 81-year-old female with a known history of hypertension, 

hyperlipidemia, COPD, dementia and previous history of smoking and recent ERCP 

and stent placement and fine-needle aspiration biopsy of the pancreatic mass 

suspected cancer at [facility] was brought to hospital by her  

due to generalized weakness and tiredness.  Patient has not been eating or 

drinking since morning and has episode of vomiting.  She was also slightly 

confused than normal.  And has been less expressive and responsive.  No fever no

chills.  No chest pain or shortness of breath.  No diarrhea.  No cough or sputum

production.  Patient has been evaluated at home.  Patient does complain of 

occasional abdominal pain as per her .





Patient was discharged from [facility] about a week ago where he had 

biliary stent placement and fine-needle aspiration biopsy was done and suspected

pancreatic cancer.  Biopsy report is pending at this time.





Chest x-ray showed no acute cardiopulmonary process


EKG showed sinus rhythm with first-degree AV block.


Laboratory data showed WBC 24.0 hemoglobin 10.8 and platelets 368


Sodium 137 potassium 5.5 chloride 101 BUN 34 and creatinine 1.54 and lactic acid

2.3 on admission


Magnesium 2.6 bilirubin level is 6.2   and alk phos: 64


Urinalysis showed cloudy with 2+ glucose and large blood nitrite positive and 

large leukoesterase with elevated WBCs.








Review of Systems





Constitutional: Patient denies any fever or chills .  Patient does have 

generalized weakness and fatigue and poor oral intake.


Abdomen: Patient was nauseated.  No episodes of vomiting or diarrhea and 

abdominal pain.


Cardiovascular: Patient denies any chest pain or short of breath no palp

itations.


Respiratory: patient denied any cough or sputum production.  No shortness of 

breath


Neurologic: Patient denied any numbness or tingling headache.


Musculoskeletal: Patient denies any complaints of joint swelling or deformity.


Skin: Yellowish discoloration.


Psychiatric: Negative


Endocrine: No heat or cold intolerance.  No recent weight gain.


Genitourinary: No dysuria or hematuria.


All other 14 point ROS negative except the above








Past Medical History


Past Medical History: COPD, Dementia, Hyperlipidemia, Hypertension, Memory 

Impairment, Renal Disease


Additional Past Medical History / Comment(s): HIATAL HERNIA


History of Any Multi-Drug Resistant Organisms: None Reported


Past Surgical History: Bowel Resection


Additional Past Surgical History / Comment(s): LT CAROTID ENDARTERECTOMY.  

COLONOSCOPY.  BILAT CATARACTS REMOVED.  D & C.  EGD


Past Anesthesia/Blood Transfusion Reactions: No Reported Reaction


Past Psychological History: No Psychological Hx Reported


Smoking Status: Former smoker


Past Alcohol Use History: Rare


Past Drug Use History: None Reported





- Past Family History


  ** Father


Family Medical History: Cancer





Medications and Allergies


                                Home Medications











 Medication  Instructions  Recorded  Confirmed  Type


 


Clopidogrel [Plavix] 75 mg PO DAILY 02/03/15 05/20/22 History


 


Aspirin EC [Ecotrin Low Dose] 81 mg PO DAILY 01/22/21 05/20/22 History


 


Memantine HCl 10 mg PO BID 01/22/21 05/20/22 History


 


Cholecalciferol [Vitamin D3 (25 25 mcg PO DAILY 05/05/22 05/20/22 History





Mcg = 1000 Iu)]    


 


Ferrous Sulfate [Feosol] 325 mg PO DAILY 05/05/22 05/20/22 History


 


Ginkgo Biloba 1200mg 1 tab PO DAILY 05/05/22 05/20/22 History


 


Simvastatin 40 mg PO HS 05/05/22 05/20/22 History


 


amLODIPine [Norvasc] 5 mg PO DAILY 05/05/22 05/20/22 History


 


buPROPion HCL [Wellbutrin XL] 150 mg PO DAILY 05/05/22 05/20/22 History


 


Escitalopram [Lexapro] 5 mg PO DAILY 05/20/22 05/20/22 History


 


Furosemide [Lasix] 40 mg PO MOTUWETHFR 05/20/22 05/20/22 History


 


Magnesium Hydroxide [Milk of 2,400 mg PO HS 05/20/22 05/20/22 History





Magnesia]    








                                    Allergies











Allergy/AdvReac Type Severity Reaction Status Date / Time


 


No Known Allergies Allergy   Verified 05/20/22 12:57














Physical Exam


Vitals: 


                                   Vital Signs











  Temp Pulse Pulse Resp BP BP Pulse Ox


 


 05/21/22 07:40  98.1 F   74  18   135/74  99


 


 05/21/22 02:15  98.0 F   78  16   136/72  99


 


 05/20/22 20:52  97.9 F    16   159/66  99


 


 05/20/22 20:13  97.8 F  92   20  146/78   96


 


 05/20/22 18:17   96   20  152/71   95


 


 05/20/22 16:08   96   20  167/80   98


 


 05/20/22 12:50  98.3 F  94   18  149/74   97








                                Intake and Output











 05/20/22 05/21/22 05/21/22





 22:59 06:59 14:59


 


Output Total  700 


 


Balance  -700 


 


Output:   


 


  Urine  700 


 


Other:   


 


  Voiding Method External Catheter  External Catheter


 


  # Voids  0 


 


  Weight 56.699 kg  

















PHYSICAL EXAMINATION: 


Patient is lying in the bed comfortably, no acute distress, awake alert and 

oriented..  Lethargic and weak.


HEENT: Normocephalic. Neck is supple. Pupils reactive.  Icterus positive nos

trils clear. Oral cavity is moist. 


Neck reveals no JVD, carotid bruits, or thyromegaly. 


CHEST EXAMINATION: Trachea is central. Symmetrical expansion. Lung fields clear 

to auscultation and percussion. 


CARDIAC: Normal S1, S2 with no gallops. No murmurs 


ABDOMEN: Soft. Bowel sounds normal. No organomegaly. No abdominal bruits. 


Extremities: Bilateral trace edema.  No clubbing or cyanosis


Neurologically awake, alert, oriented x3 with well-coordinated movements.  No 

gross focal deficits noted


Skin: No rash or skin lesions.  Yellowish discoloration of the skin.


Psychiatric: Cooperative.  Nonsuicidal


Musculoskeletal: No joint swelling or deformity.  Normal range of motion.








Results


CBC & Chem 7: 


                                 05/21/22 11:08





                                 05/21/22 11:08


Labs: 


                  Abnormal Lab Results - Last 24 Hours (Table)











  05/20/22 05/20/22 05/20/22 Range/Units





  13:53 13:53 13:53 


 


WBC  24.0 H    (3.8-10.6)  k/uL


 


RBC  3.60 L    (3.80-5.40)  m/uL


 


Hgb  10.8 L    (11.4-16.0)  gm/dL


 


Hct  33.0 L    (34.0-46.0)  %


 


RDW  16.7 H    (11.5-15.5)  %


 


Neutrophils #  22.9 H    (1.3-7.7)  k/uL


 


Lymphocytes #  0.2 L    (1.0-4.8)  k/uL


 


APTT   20.1 L   (22.0-30.0)  sec


 


Potassium    5.5 H  (3.5-5.1)  mmol/L


 


BUN    34 H  (7-17)  mg/dL


 


Creatinine    1.54 H  (0.52-1.04)  mg/dL


 


Glucose    245 H  (74-99)  mg/dL


 


Plasma Lactic Acid George     (0.7-2.0)  mmol/L


 


Magnesium    2.6 H  (1.6-2.3)  mg/dL


 


Total Bilirubin    6.2 H  (0.2-1.3)  mg/dL


 


AST    624 H  (14-36)  U/L


 


ALT    364 H  (4-34)  U/L


 


Alkaline Phosphatase    1264 H  ()  U/L


 


Urine Appearance     (Clear)  


 


Urine Glucose (UA)     (Negative)  


 


Urine Blood     (Negative)  


 


Urine Nitrite     (Negative)  


 


Ur Leukocyte Esterase     (Negative)  


 


Urine WBC     (0-5)  /hpf


 


Urine Bacteria     (None)  /hpf


 


Urine Mucus     (None)  /hpf














  05/20/22 05/20/22 Range/Units





  13:53 18:13 


 


WBC    (3.8-10.6)  k/uL


 


RBC    (3.80-5.40)  m/uL


 


Hgb    (11.4-16.0)  gm/dL


 


Hct    (34.0-46.0)  %


 


RDW    (11.5-15.5)  %


 


Neutrophils #    (1.3-7.7)  k/uL


 


Lymphocytes #    (1.0-4.8)  k/uL


 


APTT    (22.0-30.0)  sec


 


Potassium    (3.5-5.1)  mmol/L


 


BUN    (7-17)  mg/dL


 


Creatinine    (0.52-1.04)  mg/dL


 


Glucose    (74-99)  mg/dL


 


Plasma Lactic Acid George  2.3 H*   (0.7-2.0)  mmol/L


 


Magnesium    (1.6-2.3)  mg/dL


 


Total Bilirubin    (0.2-1.3)  mg/dL


 


AST    (14-36)  U/L


 


ALT    (4-34)  U/L


 


Alkaline Phosphatase    ()  U/L


 


Urine Appearance   Cloudy H  (Clear)  


 


Urine Glucose (UA)   2+ H  (Negative)  


 


Urine Blood   Large H  (Negative)  


 


Urine Nitrite   Positive H  (Negative)  


 


Ur Leukocyte Esterase   Large H  (Negative)  


 


Urine WBC   69 H  (0-5)  /hpf


 


Urine Bacteria   Rare H  (None)  /hpf


 


Urine Mucus   Rare H  (None)  /hpf








                      Microbiology - Last 24 Hours (Table)











 05/20/22 18:13 Urine Culture - Preliminary





 Urine,Voided 














Thrombosis Risk Factor Assmnt





- DVT/VTE Prophylaxis


DVT/VTE Prophylaxis: Pharmacologic Prophylaxis ordered





Assessment and Plan


Assessment: 








Acute urinary tract infection


Sepsis secondary to above


Acute kidney injury likely prerenal


Recent ERCP and biliary stent placement at [facility] suspected 

pancreatic cancer.


Hyperbilirubinemia


Elevated liver enzymes


COPD not in exacerbation


Memory impairment/dementia


Hypertension


Hyperlipidemia


Previous history of smoking


CODE STATUS is DNR/DNI





Plan:


Patient be continued gentle IV hydration and monitor renal function closely.  

Continue with ceftriaxone and follow-up urine culture report.  Follow-up liver 

enzymes.  Due to generalized medical debility and suspected underlying 

pancreatic malignancy patient's  is willing to consider palliative care. 

Oncology will be consulted and follow-up closely.  Prognosis poor at this time. 

Encourage oral intake.





Time with Patient: Greater than 30

## 2022-05-22 LAB
ALBUMIN SERPL-MCNC: 3 G/DL (ref 3.8–4.9)
ALBUMIN/GLOB SERPL: 1.38 G/DL (ref 1.6–3.17)
ALP SERPL-CCNC: 740 U/L (ref 41–126)
ALT SERPL-CCNC: 234 U/L (ref 8–44)
ANION GAP SERPL CALC-SCNC: 9.5 MMOL/L (ref 10–18)
AST SERPL-CCNC: 181 U/L (ref 13–35)
BASOPHILS # BLD AUTO: 0.1 X 10*3/UL (ref 0–0.1)
BASOPHILS NFR BLD AUTO: 0.6 %
BUN SERPL-SCNC: 14.4 MG/DL (ref 9–27)
BUN/CREAT SERPL: 12.41 RATIO (ref 12–20)
CALCIUM SPEC-MCNC: 9 MG/DL (ref 8.7–10.3)
CHLORIDE SERPL-SCNC: 103 MMOL/L (ref 96–109)
CO2 SERPL-SCNC: 26.2 MMOL/L (ref 20–27.5)
EOSINOPHIL # BLD AUTO: 0.24 X 10*3/UL (ref 0.04–0.35)
EOSINOPHIL NFR BLD AUTO: 1.5 %
ERYTHROCYTE [DISTWIDTH] IN BLOOD BY AUTOMATED COUNT: 2.81 X 10*6/UL (ref 4.1–5.2)
ERYTHROCYTE [DISTWIDTH] IN BLOOD: 15.5 % (ref 11.5–14.5)
FERRITIN SERPL-MCNC: 380 NG/ML (ref 10–291)
GLOBULIN SER CALC-MCNC: 2.2 G/DL (ref 1.6–3.3)
GLUCOSE SERPL-MCNC: 146 MG/DL (ref 70–110)
HCT VFR BLD AUTO: 25.8 % (ref 37.2–46.3)
HGB BLD-MCNC: 8.4 G/DL (ref 12–15)
IMM GRANULOCYTES BLD QL AUTO: 1.6 %
INR PPP: 1 (ref ?–1.2)
IRON SERPL-MCNC: 54 UG/DL (ref 50–170)
LDH SPEC-CCNC: 426 U/L (ref 120–246)
LYMPHOCYTES # SPEC AUTO: 1.76 X 10*3/UL (ref 0.9–5)
LYMPHOCYTES NFR SPEC AUTO: 10.8 %
MCH RBC QN AUTO: 29.9 PG (ref 27–32)
MCHC RBC AUTO-ENTMCNC: 32.6 G/DL (ref 32–37)
MCV RBC AUTO: 91.8 FL (ref 80–97)
MONOCYTES # BLD AUTO: 1.17 X 10*3/UL (ref 0.2–1)
MONOCYTES NFR BLD AUTO: 7.2 %
NEUTROPHILS # BLD AUTO: 12.81 X 10*3/UL (ref 1.8–7.7)
NEUTROPHILS NFR BLD AUTO: 78.3 %
NRBC BLD AUTO-RTO: 0 /100 WBCS (ref 0–0)
PLATELET # BLD AUTO: 259 X 10*3/UL (ref 140–440)
POTASSIUM SERPL-SCNC: 3.5 MMOL/L (ref 3.5–5.5)
PROT SERPL-MCNC: 5.2 G/DL (ref 6.2–8.2)
PT BLD: 11.2 SEC (ref 9–12)
SODIUM SERPL-SCNC: 139 MMOL/L (ref 135–145)
TIBC SERPL-MCNC: 252 UG/DL (ref 228–460)
WBC # BLD AUTO: 16.34 X 10*3/UL (ref 4.5–10)

## 2022-05-22 RX ADMIN — MEMANTINE HYDROCHLORIDE SCH MG: 5 TABLET ORAL at 07:43

## 2022-05-22 RX ADMIN — HEPARIN SODIUM SCH UNIT: 5000 INJECTION INTRAVENOUS; SUBCUTANEOUS at 15:30

## 2022-05-22 RX ADMIN — CEFAZOLIN SCH MLS/HR: 330 INJECTION, POWDER, FOR SOLUTION INTRAMUSCULAR; INTRAVENOUS at 02:28

## 2022-05-22 RX ADMIN — CLOPIDOGREL BISULFATE SCH MG: 75 TABLET ORAL at 07:43

## 2022-05-22 RX ADMIN — ASPIRIN 81 MG CHEWABLE TABLET SCH MG: 81 TABLET CHEWABLE at 07:43

## 2022-05-22 RX ADMIN — MEMANTINE HYDROCHLORIDE SCH MG: 5 TABLET ORAL at 20:30

## 2022-05-22 RX ADMIN — CEFAZOLIN SCH MLS/HR: 330 INJECTION, POWDER, FOR SOLUTION INTRAMUSCULAR; INTRAVENOUS at 20:29

## 2022-05-22 RX ADMIN — HEPARIN SODIUM SCH UNIT: 5000 INJECTION INTRAVENOUS; SUBCUTANEOUS at 07:38

## 2022-05-22 RX ADMIN — HEPARIN SODIUM SCH UNIT: 5000 INJECTION INTRAVENOUS; SUBCUTANEOUS at 01:52

## 2022-05-22 NOTE — P.CONS
History of Present Illness





- Reason for Consult


Consult date: 05/22/22


new pancreatic cancer diagnosis 





- History of Present Illness





Mrs. Means presents to ER with worsening lethargy and decreased PO intake. She 

was brought in by . She was recently diagnosis with pancreatic cancer and

was scheduled to see Dr. Fox in office on June 8th, however is now admitted. She

was found to have possible UTI, treated. She is receiving IV Fluids and has 

become more awake per . The details of her cancer are not known as the 

records from [facility] have not yet been sent.  





Review of Systems


ROS unobtainable: due to mental status


All systems: negative


Constitutional: Reports as per HPI





Past Medical History


Past Medical History: COPD, Dementia, Hyperlipidemia, Hypertension, Memory 

Impairment, Renal Disease


Additional Past Medical History / Comment(s): HIATAL HERNIA


History of Any Multi-Drug Resistant Organisms: None Reported


Past Surgical History: Bowel Resection


Additional Past Surgical History / Comment(s): LT CAROTID ENDARTERECTOMY.  

COLONOSCOPY.  BILAT CATARACTS REMOVED.  D & C.  EGD


Past Anesthesia/Blood Transfusion Reactions: No Reported Reaction


Past Psychological History: No Psychological Hx Reported


Smoking Status: Former smoker


Past Alcohol Use History: Rare


Past Drug Use History: None Reported





- Past Family History


  ** Father


Family Medical History: Cancer





Medications and Allergies


                                Home Medications











 Medication  Instructions  Recorded  Confirmed  Type


 


Clopidogrel [Plavix] 75 mg PO DAILY 02/03/15 05/20/22 History


 


Aspirin EC [Ecotrin Low Dose] 81 mg PO DAILY 01/22/21 05/20/22 History


 


Memantine HCl 10 mg PO BID 01/22/21 05/20/22 History


 


Cholecalciferol [Vitamin D3 (25 25 mcg PO DAILY 05/05/22 05/20/22 History





Mcg = 1000 Iu)]    


 


Ferrous Sulfate [Feosol] 325 mg PO DAILY 05/05/22 05/20/22 History


 


Ginkgo Biloba 1200mg 1 tab PO DAILY 05/05/22 05/20/22 History


 


Simvastatin 40 mg PO HS 05/05/22 05/20/22 History


 


amLODIPine [Norvasc] 5 mg PO DAILY 05/05/22 05/20/22 History


 


buPROPion HCL [Wellbutrin XL] 150 mg PO DAILY 05/05/22 05/20/22 History


 


Escitalopram [Lexapro] 5 mg PO DAILY 05/20/22 05/20/22 History


 


Furosemide [Lasix] 40 mg PO MOTUWETHFR 05/20/22 05/20/22 History


 


Magnesium Hydroxide [Milk of 2,400 mg PO HS 05/20/22 05/20/22 History





Magnesia]    








                                    Allergies











Allergy/AdvReac Type Severity Reaction Status Date / Time


 


No Known Allergies Allergy   Verified 05/20/22 12:57














Physical Exam


Vitals: 


                                   Vital Signs











  Temp Pulse Resp BP Pulse Ox


 


 05/22/22 07:41  98.2 F  75  16  152/81  97


 


 05/22/22 01:52  98.8 F  79  16  164/74  99


 


 05/21/22 19:58     141/71 


 


 05/21/22 19:52  98.8 F  81  18  182/78  100


 


 05/21/22 14:00  98 F  78  17  133/73  96








                                Intake and Output











 05/21/22 05/22/22 05/22/22





 22:59 06:59 14:59


 


Intake Total  900 


 


Output Total 2200  


 


Balance -2200 900 


 


Intake:   


 


  Intake, IV Titration  900 





  Amount   


 


    Sodium Chloride 0.9% 1,  900 





    000 ml @ 75 mls/hr IV .   





    [id number] Pending sale to [facility] Rx#:[id number]   


 


Output:   


 


  Urine 2200  


 


Other:   


 


  Voiding Method External Catheter  External Catheter














Jasundice


Weak


Mildly confused


Inapprprpiate but awak


Abdomen no pain


HR Irr


Ext: mild edema





Results


CBC & Chem 7: 


                                 05/22/22 05:34





                                 05/22/22 05:34


Labs: 


                  Abnormal Lab Results - Last 24 Hours (Table)











  05/21/22 05/22/22 05/22/22 Range/Units





  11:08 05:34 05:34 


 


WBC  23.44 H  16.34 H   (4.50-10.00)  X 10*3/uL


 


RBC  2.78 L  2.81 L   (4.10-5.20)  X 10*6/uL


 


Hgb  8.1 L  8.4 L   (12.0-15.0)  g/dL


 


Hct  26.1 L  25.8 L   (37.2-46.3)  %


 


MCHC  31.0 L    (32.0-37.0)  g/dL


 


RDW  15.2 H  15.5 H   (11.5-14.5)  %


 


MPV   12.7 H   (9.5-12.2)  fL


 


Immature Gran #  0.32 H  0.26 H   (0.00-0.04)  X 10*3/uL


 


Neutrophils #  19.68 H  12.81 H   (1.80-7.70)  X 10*3/uL


 


Monocytes #  1.46 H  1.17 H   (0.20-1.00)  X 10*3/uL


 


Basophils #  0.19 H    (0.00-0.10)  X 10*3/uL


 


Anion Gap    9.50 L  (10.00-18.00)  mmol/L


 


Est GFR (CKD-EPI)AfAm    51.9 L  (60.0-200.0)   


 


Est GFR (CKD-EPI)NonAf    44.8 L  (60.0-200.0)   


 


Glucose    146 H  ()  mg/dL


 


Total Bilirubin    2.60 H  (0.30-1.20)  mg/dL


 


AST    181 H  (13-35)  U/L


 


ALT    234 H  (8-44)  U/L


 


Alkaline Phosphatase    740 H  ()  U/L


 


Total Protein    5.2 L  (6.2-8.2)  g/dL


 


Albumin    3.0 L  (3.8-4.9)  g/dL


 


Albumin/Globulin Ratio    1.38 L  (1.60-3.17)  g/dL








                      Microbiology - Last 24 Hours (Table)











 05/20/22 18:13 Urine Culture - Preliminary





 Urine,Voided    Gram Neg Bacilli


 


 05/20/22 14:51 Blood Culture - Preliminary





 Blood    No Growth after 24 hours


 


 05/20/22 15:11 Blood Culture - Preliminary





 Blood    No Growth after 24 hours














Assessment and Plan


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


Current Visit: Yes   Status: Acute   Code(s): C25.9 - MALIGNANT NEOPLASM OF 

PANCREAS, UNSPECIFIED; C77.2 - SECONDARY AND UNSP MALIGNANT NEOPLASM OF INTRA-

ABD NODES   SNOMED Code(s): 970915025


   





(2) Normocytic anemia


Current Visit: Yes   Status: Acute   Code(s): D64.9 - ANEMIA, UNSPECIFIED   

SNOMED Code(s): 818717517


   





(3) Leukocytosis


Current Visit: Yes   Status: Acute   Code(s): D72.829 - ELEVATED WHITE BLOOD 

CELL COUNT, UNSPECIFIED   SNOMED Code(s): 324413429


   





(4) Biliary obstruction


Current Visit: No   Status: Acute   Code(s): K83.1 - OBSTRUCTION OF BILE DUCT   

SNOMED Code(s): 199378852


   





(5) Jaundice


Current Visit: No   Status: Acute   Code(s): R17 - UNSPECIFIED JAUNDICE   SNOMED

Code(s): 22978443


   


Plan: 





Treatment of potential infectious etiology and IV hydration for dehydration


Await records (requested) for further recs on new diagnosis of pancreatic 

cancer, Dr. Fox will be rounding tomorrow.

## 2022-05-23 LAB
ALBUMIN SERPL-MCNC: 2.8 G/DL (ref 3.8–4.9)
ALBUMIN/GLOB SERPL: 1.4 G/DL (ref 1.6–3.17)
ALP SERPL-CCNC: 632 U/L (ref 41–126)
ALT SERPL-CCNC: 179 U/L (ref 8–44)
ANION GAP SERPL CALC-SCNC: 10.1 MMOL/L (ref 10–18)
AST SERPL-CCNC: 118 U/L (ref 13–35)
BASOPHILS # BLD AUTO: 0.07 X 10*3/UL (ref 0–0.1)
BASOPHILS NFR BLD AUTO: 0.6 %
BUN SERPL-SCNC: 14.2 MG/DL (ref 9–27)
BUN/CREAT SERPL: 11.83 RATIO (ref 12–20)
CALCIUM SPEC-MCNC: 8.6 MG/DL (ref 8.7–10.3)
CHLORIDE SERPL-SCNC: 103 MMOL/L (ref 96–109)
CO2 SERPL-SCNC: 22.9 MMOL/L (ref 20–27.5)
EOSINOPHIL # BLD AUTO: 0.24 X 10*3/UL (ref 0.04–0.35)
EOSINOPHIL NFR BLD AUTO: 2 %
ERYTHROCYTE [DISTWIDTH] IN BLOOD BY AUTOMATED COUNT: 2.62 X 10*6/UL (ref 4.1–5.2)
ERYTHROCYTE [DISTWIDTH] IN BLOOD: 15.9 % (ref 11.5–14.5)
GLOBULIN SER CALC-MCNC: 2 G/DL (ref 1.6–3.3)
GLUCOSE SERPL-MCNC: 147 MG/DL (ref 70–110)
HCT VFR BLD AUTO: 23.9 % (ref 37.2–46.3)
HGB BLD-MCNC: 7.4 G/DL (ref 12–15)
IMM GRANULOCYTES BLD QL AUTO: 2 %
LYMPHOCYTES # SPEC AUTO: 2.06 X 10*3/UL (ref 0.9–5)
LYMPHOCYTES NFR SPEC AUTO: 16.9 %
MAGNESIUM SPEC-SCNC: 1.9 MG/DL (ref 1.5–2.4)
MCH RBC QN AUTO: 28.2 PG (ref 27–32)
MCHC RBC AUTO-ENTMCNC: 31 G/DL (ref 32–37)
MCV RBC AUTO: 91.2 FL (ref 80–97)
MONOCYTES # BLD AUTO: 0.94 X 10*3/UL (ref 0.2–1)
MONOCYTES NFR BLD AUTO: 7.7 %
NEUTROPHILS # BLD AUTO: 8.64 X 10*3/UL (ref 1.8–7.7)
NEUTROPHILS NFR BLD AUTO: 70.8 %
NRBC BLD AUTO-RTO: 0 /100 WBCS (ref 0–0)
PLATELET # BLD AUTO: 268 X 10*3/UL (ref 140–440)
POTASSIUM SERPL-SCNC: 3.5 MMOL/L (ref 3.5–5.5)
PROT SERPL-MCNC: 4.8 G/DL (ref 6.2–8.2)
SODIUM SERPL-SCNC: 136 MMOL/L (ref 135–145)
WBC # BLD AUTO: 12.19 X 10*3/UL (ref 4.5–10)

## 2022-05-23 RX ADMIN — MEMANTINE HYDROCHLORIDE SCH MG: 5 TABLET ORAL at 21:24

## 2022-05-23 RX ADMIN — CEFAZOLIN SCH MLS/HR: 330 INJECTION, POWDER, FOR SOLUTION INTRAMUSCULAR; INTRAVENOUS at 17:35

## 2022-05-23 RX ADMIN — HEPARIN SODIUM SCH UNIT: 5000 INJECTION INTRAVENOUS; SUBCUTANEOUS at 02:27

## 2022-05-23 RX ADMIN — CLOPIDOGREL BISULFATE SCH MG: 75 TABLET ORAL at 08:32

## 2022-05-23 RX ADMIN — CHOLECALCIFEROL TAB 125 MCG (5000 UNIT) SCH MCG: 125 TAB at 16:04

## 2022-05-23 RX ADMIN — ASPIRIN 81 MG CHEWABLE TABLET SCH MG: 81 TABLET CHEWABLE at 08:32

## 2022-05-23 RX ADMIN — CEFAZOLIN SCH MLS/HR: 330 INJECTION, POWDER, FOR SOLUTION INTRAMUSCULAR; INTRAVENOUS at 06:17

## 2022-05-23 RX ADMIN — MEMANTINE HYDROCHLORIDE SCH MG: 5 TABLET ORAL at 08:32

## 2022-05-23 RX ADMIN — HEPARIN SODIUM SCH UNIT: 5000 INJECTION INTRAVENOUS; SUBCUTANEOUS at 16:03

## 2022-05-23 RX ADMIN — HEPARIN SODIUM SCH UNIT: 5000 INJECTION INTRAVENOUS; SUBCUTANEOUS at 08:31

## 2022-05-23 NOTE — P.CONS
History of Present Illness





- Reason for Consult


Consult date: 05/23/22


goals of care


Requesting physician: Kami Linares





- Chief Complaint


weakness





- History of Present Illness


Patient is a 81-year-old female with a known history of hypertension, 

hyperlipidemia, COPD, dementia and previous history of smoking and recent ERCP 

and stent placement and fine-needle aspiration biopsy of the pancreatic mass 

suspected cancer at [facility] was brought to hospital by her  

due to generalized weakness and tiredness.  Patient has not been eating or 

drinking since morning and has episode of vomiting.  She was also slightly more 

confused than normal.  And has been less expressive and responsive.  No fever no

chills.  No chest pain or shortness of breath.  No diarrhea.  No cough or sputum

production.  Patient has been evaluated at home.  Patient does complain of 

occasional abdominal pain as per her .





Patient was discharged from [facility] about a week ago where he had 

biliary stent placement and fine-needle aspiration biopsy was done. She was 

diagnosis with pancreatic cancer and was scheduled to see Dr. Fox in office on 

June 8th, however is now admitted. She was found to have possible UTI, treated. 

She is receiving IV Fluids and has become more awake per . The details of

her cancer are not known as the records from [facility] have not yet been sent.  











Review of Systems





Review Of Systems:


Constitutional: No fever, no chills, no night sweats.  + recent weight loss, 

weakness, and fatigue.


HEENT: No headache.  No blurred vision or double vision, no loss of vision.  No 

loss of Hearing, no ringing in the ears, no dizziness.  No nasal drainage or 

congestion.  No epistaxis.  No sore throat.


Lungs: No shortness of breath, cough, no sputum production.  No wheezing.


Cardiovascular: No chest pain, no lower extremity edema.  No palpitations.  No 

paroxysmal nocturnal dyspnea.  No orthopnea.  No lightheadedness or dizziness.  

No syncopal episodes.


Abdominal: + occasional abdominal pain.  + nausea, vomiting.  No diarrhea.  No 

constipation.  No bloody or tarry stools..  + loss of appetite.


Genitourinary: No dysuria, increased frequency, urgency.  No urinary retention.


Musculoskeletal: No myalgias.  + muscle weakness. No back pain.  No neck pain.


Integumentary: No wounds, no lesions.  No rash or pruritus.  No unusual 

bruising.  No change in hair or nails.


Neurologic: No aphasia. No facial droop. + confusion 


Psychiatric: No depression.  No anxiety.  No mood swings.





Past Medical History


Past Medical History: COPD, Dementia, Hyperlipidemia, Hypertension, Memory 

Impairment, Renal Disease


Additional Past Medical History / Comment(s): HIATAL HERNIA


History of Any Multi-Drug Resistant Organisms: None Reported


Past Surgical History: Bowel Resection


Additional Past Surgical History / Comment(s): LT CAROTID ENDARTERECTOMY.  

COLONOSCOPY.  BILAT CATARACTS REMOVED.  D & C.  EGD


Past Anesthesia/Blood Transfusion Reactions: No Reported Reaction


Past Psychological History: No Psychological Hx Reported


Smoking Status: Former smoker


Past Alcohol Use History: Rare


Past Drug Use History: None Reported





- Past Family History


  ** Father


Family Medical History: Cancer





Medications and Allergies


                                Home Medications











 Medication  Instructions  Recorded  Confirmed  Type


 


Clopidogrel [Plavix] 75 mg PO DAILY 02/03/15 05/20/22 History


 


Aspirin EC [Ecotrin Low Dose] 81 mg PO DAILY 01/22/21 05/20/22 History


 


Memantine HCl 10 mg PO BID 01/22/21 05/20/22 History


 


Cholecalciferol [Vitamin D3 (25 25 mcg PO DAILY 05/05/22 05/20/22 History





Mcg = 1000 Iu)]    


 


Ferrous Sulfate [Feosol] 325 mg PO DAILY 05/05/22 05/20/22 History


 


Ginkgo Biloba 1200mg 1 tab PO DAILY 05/05/22 05/20/22 History


 


Simvastatin 40 mg PO HS 05/05/22 05/20/22 History


 


amLODIPine [Norvasc] 5 mg PO DAILY 05/05/22 05/20/22 History


 


buPROPion HCL [Wellbutrin XL] 150 mg PO DAILY 05/05/22 05/20/22 History


 


Escitalopram [Lexapro] 5 mg PO DAILY 05/20/22 05/20/22 History


 


Furosemide [Lasix] 40 mg PO MOTUWETHFR 05/20/22 05/20/22 History


 


Magnesium Hydroxide [Milk of 2,400 mg PO HS 05/20/22 05/20/22 History





Magnesia]    








                                    Allergies











Allergy/AdvReac Type Severity Reaction Status Date / Time


 


No Known Allergies Allergy   Verified 05/20/22 12:57














Physical Exam


Vitals: 


                                   Vital Signs











  Temp Pulse Resp BP Pulse Ox


 


 05/23/22 06:54  98.8 F  78  17  146/70  96


 


 05/23/22 01:36  98.2 F  83  16  156/73  96


 


 05/22/22 18:15  99.1 F  82  17  148/79  97








                                Intake and Output











 05/22/22 05/23/22 05/23/22





 22:59 06:59 14:59


 


Intake Total  1200 


 


Output Total 1200  


 


Balance -1200 1200 


 


Intake:   


 


  Intake, IV Titration  900 





  Amount   


 


    Sodium Chloride 0.9% 1,  900 





    000 ml @ 75 mls/hr IV .   





    [id number] [facility] Rx#:[id number]   


 


  Oral  300 


 


Output:   


 


  Urine 1200  


 


Other:   


 


  Voiding Method External Catheter  External Catheter


 


  Weight   56.699 kg











General: Patient awake alert. Oriented x 1, to person.  No acute distress.


HEENT: Head is atraumatic, normocephalic Neck is supple. Sclerae are clear. 

Pupils equal, round and reactive to light bilaterally.  


CV: Heart regular in rate and rhythm positive S1 and S2.  No S3. No S4.  No 

clicks, rubs or murmurs. No JVD. Peripheral pulses equal. 2/4


Lungs: Clear to auscultation bilaterally.  No wheezes rales or rhonchi. 

Respirations even and nonlabored. No intercostal retractions.


Abdomen/GI: Soft. Bowel sounds present in all 4 quadrants. Bowel sounds 

normoactive. No abdominal tenderness.  


Musculoskeletal/ Extremities: No tenderness on muscular exam.  No ecchymosis.


Vascular: Radial pulses equal. 2/4.


Skin: No rash. + slight jaundice


Neurologic: Awake, alert and oriented times 1. 


Psychiatric: Appropriate mood and affect.











Results


CBC & Chem 7: 


                                 05/23/22 05:27





                                 05/23/22 05:27


Labs: 


                  Abnormal Lab Results - Last 24 Hours (Table)











  05/22/22 05/22/22 05/23/22 Range/Units





  14:58 14:58 05:27 


 


WBC    12.19 H  (4.50-10.00)  X 10*3/uL


 


RBC    2.62 L  (4.10-5.20)  X 10*6/uL


 


Hgb    7.4 L  (12.0-15.0)  g/dL


 


Hct    23.9 L  (37.2-46.3)  %


 


MCHC    31.0 L  (32.0-37.0)  g/dL


 


RDW    15.9 H  (11.5-14.5)  %


 


MPV    12.3 H  (9.5-12.2)  fL


 


Immature Gran #    0.24 H  (0.00-0.04)  X 10*3/uL


 


Neutrophils #    8.64 H  (1.80-7.70)  X 10*3/uL


 


Est GFR (CKD-EPI)AfAm     (60.0-200.0)   


 


Est GFR (CKD-EPI)NonAf     (60.0-200.0)   


 


BUN/Creatinine Ratio     (12.00-20.00)  Ratio


 


Glucose     ()  mg/dL


 


Calcium     (8.7-10.3)  mg/dL


 


Transferrin  180.0 L    (204.0-354.0)  mg/dL


 


Ferritin  380.0 H    (10.0-291.0)  ng/mL


 


Total Bilirubin     (0.30-1.20)  mg/dL


 


AST     (13-35)  U/L


 


ALT     (8-44)  U/L


 


Alkaline Phosphatase     ()  U/L


 


Lactate Dehydrogenase  426 H    (120-246)  U/L


 


Total Protein     (6.2-8.2)  g/dL


 


Albumin     (3.8-4.9)  g/dL


 


Albumin/Globulin Ratio     (1.60-3.17)  g/dL


 


CA 19-9 Antigen   1081.0 H   (0.0-34.9)  U/mL


 


Vitamin B12  1783.0 H    (200.0-944.0)  pg/mL


 


Vitamin D 25-Hydroxy  7.9 L    (30.0-100.0)  ng/mL














  05/23/22 Range/Units





  05:27 


 


WBC   (4.50-10.00)  X 10*3/uL


 


RBC   (4.10-5.20)  X 10*6/uL


 


Hgb   (12.0-15.0)  g/dL


 


Hct   (37.2-46.3)  %


 


MCHC   (32.0-37.0)  g/dL


 


RDW   (11.5-14.5)  %


 


MPV   (9.5-12.2)  fL


 


Immature Gran #   (0.00-0.04)  X 10*3/uL


 


Neutrophils #   (1.80-7.70)  X 10*3/uL


 


Est GFR (CKD-EPI)AfAm  49.8 L  (60.0-200.0)   


 


Est GFR (CKD-EPI)NonAf  42.9 L  (60.0-200.0)   


 


BUN/Creatinine Ratio  11.83 L  (12.00-20.00)  Ratio


 


Glucose  147 H  ()  mg/dL


 


Calcium  8.6 L  (8.7-10.3)  mg/dL


 


Transferrin   (204.0-354.0)  mg/dL


 


Ferritin   (10.0-291.0)  ng/mL


 


Total Bilirubin  2.20 H  (0.30-1.20)  mg/dL


 


AST  118 H  (13-35)  U/L


 


ALT  179 H  (8-44)  U/L


 


Alkaline Phosphatase  632 H  ()  U/L


 


Lactate Dehydrogenase   (120-246)  U/L


 


Total Protein  4.8 L  (6.2-8.2)  g/dL


 


Albumin  2.8 L  (3.8-4.9)  g/dL


 


Albumin/Globulin Ratio  1.40 L  (1.60-3.17)  g/dL


 


CA 19-9 Antigen   (0.0-34.9)  U/mL


 


Vitamin B12   (200.0-944.0)  pg/mL


 


Vitamin D 25-Hydroxy   (30.0-100.0)  ng/mL








                      Microbiology - Last 24 Hours (Table)











 05/20/22 18:13 Urine Culture - Final





 Urine,Voided    Klebsiella pneumoniae


 


 05/20/22 14:51 Blood Culture - Preliminary





 Blood    No Growth after 48 hours


 


 05/20/22 15:11 Blood Culture - Preliminary





 Blood    No Growth after 48 hours














Assessment and Plan


Assessment: 


Reason for consult -  goals of care





Social


* Occupation - retired


* Marital status -  for 28 years


* Children/grandchildren - 2 step daughters


* Residence - house


* Who do you reside with - 


* ETOH - no


* Tobacco - quit 1 year ago


* Illicit drugs - no





Spiritual/Cultural


* A spiritual person - no


* Anglican - Church


* Belong to a particular Catholic - no


* Beliefs a source of comfort and  strength - no


* Buddhism or cultural practices restrictions - no


* EOL considerations/rituals? none





Functional Assessment


* Able to walk independently - No, her  always helps her


* Assistive devices - has a cane , but does not use it


* Able to use the bathroom independently - no, needs assistance


* Continent - no, wears briefs


* Require assistance bathing- yes, caregiver comes 2x/wk


* Able to feed self - sometimes


* Who prepares meals - her 


* How many meals a day eaten - 2-3


* What percentage of meals eaten daily - 50%, less in the last week


* Able to clean house/do laundry - 


* Transportation -


* Able to shop - no


* Who manages medications - 


* Who manages finances - 





PPS score - 50%








Psychological/Emotional 


* Dementia present - yes


* Insight and judgment - no


* How does patient cope with stress - talks to 


* Depression - no


* Suicidal thoughts - denies


* Good support system - yes


* Frequent hospitalizations - no

















Plan: 


Symptoms


* Pain - 0/10


* Fatigue - yes


* weakness - yes


* SOB - no


* Insomnia - no


* N/V - yes


* Anxiety - no


* Depression - no


* Confusion - yes, continue namenda


* Agitation - no


* Hallucinations - no


* Appetite/weight loss - decreased appetite since biliary stent placement, 5-7lb

  weight loss in past 3 weeks


* Dysphagia - no


* Constipation - no, continue colace prn


* Incontinence - yes, wears briefs


* Itch - no








Summary/Goals - Met with the patient's .  He is the patient's primary 

caregiver. Awaiting records from [facility] for further recs from oncology on 

new diagnosis of pancreatic cancer. Patient's  would like to speak to Dr. Fox before trying to establish goals of care.  Will follow up with patient and 

her  tomorrow. 





Advanced Directives - information given





Code Status - DNR





Thank you for this consult





Nicci Cowan [facility]


Palliative Care


[facility] [id number]


Email: [email or web address]








Time with Patient: Greater than 30

## 2022-05-23 NOTE — P.PN
Subjective


Progress Note Date: 05/22/22





Patient is a 81-year-old female with a known history of hypertension, 

hyperlipidemia, COPD, dementia and previous history of smoking and recent ERCP 

and stent placement and fine-needle aspiration biopsy of the pancreatic mass 

suspected cancer at [facility] was brought to hospital by her  anais cooley to generalized weakness and tiredness.  Patient has not been eating or 

drinking since morning and has episode of vomiting.  She was also slightly 

confused than normal.  And has been less expressive and responsive.  No fever no

chills.  No chest pain or shortness of breath.  No diarrhea.  No cough or sputum

production.  Patient has been evaluated at home.  Patient does complain of 

occasional abdominal pain as per her .





Patient was discharged from [facility] about a week ago where he had 

biliary stent placement and fine-needle aspiration biopsy was done and suspected

pancreatic cancer.  Biopsy report is pending at this time.





Chest x-ray showed no acute cardiopulmonary process


EKG showed sinus rhythm with first-degree AV block.


Laboratory data showed WBC 24.0 hemoglobin 10.8 and platelets 368


Sodium 137 potassium 5.5 chloride 101 BUN 34 and creatinine 1.54 and lactic acid

2.3 on admission


Magnesium 2.6 bilirubin level is 6.2   and alk phos: 64


Urinalysis showed cloudy with 2+ glucose and large blood nitrite positive and 

large leukoesterase with elevated WBCs.





5/22/2022.


Patient is currently resting in the bed.  Awake alert and oriented.  Patient sta

sandra that she feels better today.  No complaints of abdominal pain.  Otherwise 

patient does have poor oral intake and did not have any bowel movement for this.

 No fever no chills.  No cough or sputum production.  Laboratory data showed WBC

trending down to 16.3 hemoglobin 8.4 and platelets 259


BUN 49 creatinine 1.2 liver enzymes showed total bilirubin level 2.6,  

 alk phos 740 and albumin 3.0.  CEA 1081 CA 19-9 is 1783.  Oncology is on

board.





Urine culture showed gram-negative bacilli and is being continued on 

ceftriaxone.





Current medications reviewed.








Objective





- Vital Signs


Vital signs: 


                                   Vital Signs











Temp  98.2 F   05/22/22 07:41


 


Pulse  75   05/22/22 07:41


 


Resp  16   05/22/22 07:41


 


BP  152/81   05/22/22 07:41


 


Pulse Ox  97   05/22/22 07:41








                                 Intake & Output











 05/21/22 05/22/22 05/22/22





 18:59 06:59 18:59


 


Intake Total  900 


 


Output Total 2200  


 


Balance -2200 900 


 


Intake:   


 


  Intake, IV Titration  900 





  Amount   


 


    Sodium Chloride 0.9% 1,  900 





    000 ml @ 75 mls/hr IV .   





    [id number] [facility] Rx#:[id number]   


 


Output:   


 


  Urine 2200  


 


Other:   


 


  Voiding Method External Catheter External Catheter 














- Exam





PHYSICAL EXAMINATION: 


Patient is lying in the bed comfortably, no acute distress, awake alert and 

oriented..  Lethargic and weak.


HEENT: Normocephalic. Neck is supple. Pupils reactive.  Icterus positive 

nostrils clear. Oral cavity is moist. 


Neck reveals no JVD, carotid bruits, or thyromegaly. 


CHEST EXAMINATION: Trachea is central. Symmetrical expansion. Lung fields clear 

to auscultation and percussion. 


CARDIAC: Normal S1, S2 with no gallops. No murmurs 


ABDOMEN: Soft. Bowel sounds normal. No organomegaly. No abdominal bruits. 


Extremities: Bilateral trace edema.  No clubbing or cyanosis


Neurologically awake, alert, oriented x3 with well-coordinated movements.  No 

gross focal deficits noted


Skin: No rash or skin lesions.  Yellowish discoloration of the skin.


Psychiatric: Cooperative.  Nonsuicidal


Musculoskeletal: No joint swelling or deformity.  Normal range of motion.








- Labs


CBC & Chem 7: 


                                 05/22/22 05:34





                                 05/22/22 05:34


Labs: 


                  Abnormal Lab Results - Last 24 Hours (Table)











  05/21/22 05/21/22 05/22/22 Range/Units





  11:08 11:08 05:34 


 


WBC  23.44 H   16.34 H  (4.50-10.00)  X 10*3/uL


 


RBC  2.78 L   2.81 L  (4.10-5.20)  X 10*6/uL


 


Hgb  8.1 L   8.4 L  (12.0-15.0)  g/dL


 


Hct  26.1 L   25.8 L  (37.2-46.3)  %


 


MCHC  31.0 L    (32.0-37.0)  g/dL


 


RDW  15.2 H   15.5 H  (11.5-14.5)  %


 


MPV    12.7 H  (9.5-12.2)  fL


 


Immature Gran #  0.32 H   0.26 H  (0.00-0.04)  X 10*3/uL


 


Neutrophils #  19.68 H   12.81 H  (1.80-7.70)  X 10*3/uL


 


Monocytes #  1.46 H   1.17 H  (0.20-1.00)  X 10*3/uL


 


Basophils #  0.19 H    (0.00-0.10)  X 10*3/uL


 


Anion Gap     (10.00-18.00)  mmol/L


 


BUN   24 H   (7-17)  mg/dL


 


Creatinine   1.27 H   (0.52-1.04)  mg/dL


 


Est GFR (CKD-EPI)AfAm     (60.0-200.0)   


 


Est GFR (CKD-EPI)NonAf     (60.0-200.0)   


 


Glucose   131 H   (74-99)  mg/dL


 


Total Bilirubin   2.8 H   (0.2-1.3)  mg/dL


 


AST   292 H   (14-36)  U/L


 


ALT   264 H   (4-34)  U/L


 


Alkaline Phosphatase   701 H   ()  U/L


 


Total Protein   5.6 L   (6.3-8.2)  g/dL


 


Albumin   3.0 L   (3.5-5.0)  g/dL


 


Albumin/Globulin Ratio     (1.60-3.17)  g/dL














  05/22/22 Range/Units





  05:34 


 


WBC   (4.50-10.00)  X 10*3/uL


 


RBC   (4.10-5.20)  X 10*6/uL


 


Hgb   (12.0-15.0)  g/dL


 


Hct   (37.2-46.3)  %


 


MCHC   (32.0-37.0)  g/dL


 


RDW   (11.5-14.5)  %


 


MPV   (9.5-12.2)  fL


 


Immature Gran #   (0.00-0.04)  X 10*3/uL


 


Neutrophils #   (1.80-7.70)  X 10*3/uL


 


Monocytes #   (0.20-1.00)  X 10*3/uL


 


Basophils #   (0.00-0.10)  X 10*3/uL


 


Anion Gap  9.50 L  (10.00-18.00)  mmol/L


 


BUN   (7-17)  mg/dL


 


Creatinine   (0.52-1.04)  mg/dL


 


Est GFR (CKD-EPI)AfAm  51.9 L  (60.0-200.0)   


 


Est GFR (CKD-EPI)NonAf  44.8 L  (60.0-200.0)   


 


Glucose  146 H  (74-99)  mg/dL


 


Total Bilirubin  2.60 H  (0.2-1.3)  mg/dL


 


AST  181 H  (14-36)  U/L


 


ALT  234 H  (4-34)  U/L


 


Alkaline Phosphatase  740 H  ()  U/L


 


Total Protein  5.2 L  (6.3-8.2)  g/dL


 


Albumin  3.0 L  (3.5-5.0)  g/dL


 


Albumin/Globulin Ratio  1.38 L  (1.60-3.17)  g/dL








                      Microbiology - Last 24 Hours (Table)











 05/20/22 18:13 Urine Culture - Preliminary





 Urine,Voided    Gram Neg Bacilli


 


 05/20/22 14:51 Blood Culture - Preliminary





 Blood    No Growth after 24 hours


 


 05/20/22 15:11 Blood Culture - Preliminary





 Blood    No Growth after 24 hours














Assessment and Plan


Assessment: 








Acute urinary tract infection


Sepsis secondary to above


Acute kidney injury likely prerenal


Recent ERCP and biliary stent placement at [facility] suspected 

pancreatic cancer.


Hyperbilirubinemia


Elevated liver enzymes


COPD not in exacerbation


Memory impairment/dementia


Hypertension


Hyperlipidemia


Previous history of smoking


CODE STATUS is DNR/DNI





Plan:


Patient be continued gentle IV hydration and monitor renal function closely.  

Continue with ceftriaxone and follow-up urine culture report.  Follow-up liver 

enzymes.  


Due to generalized medical debility and suspected underlying pancreatic 

malignancy patient's  is willing to consider palliative care.  Oncology 

will be consulted and follow-up closely.  Prognosis poor at this time.  

Encourage oral intake.





Time with Patient: Greater than 30

## 2022-05-23 NOTE — P.PN
Subjective


Progress Note Date: 05/23/22


Patient is a 81-year-old female with a known history of hypertension, 

hyperlipidemia, COPD, dementia and previous history of smoking and recent ERCP 

and stent placement and fine-needle aspiration biopsy of the pancreatic mass 

suspected cancer at [facility] was brought to hospital by her  

due to generalized weakness and tiredness.  Patient has not been eating or 

drinking since morning and has episode of vomiting.  She was also slightly 

confused than normal.  And has been less expressive and responsive.  No fever no

chills.  No chest pain or shortness of breath.  No diarrhea.  No cough or sputum

production.  Patient has been evaluated at home.  Patient does complain of 

occasional abdominal pain as per her .





Patient was discharged from [facility] about a week ago where he had 

biliary stent placement and fine-needle aspiration biopsy was done and suspected

pancreatic cancer.  Biopsy report is pending at this time.





5/23/22 less jaundiced, denies pain.  Maintained on ceftriaxone for UTI.  

Afebrile, WBC decreased to 12.19.  Hemoglobin decreased to 7.4, platelets 268, T

bili 2.2, , , alk phos 632.  CA-19-9-9 antigen elevated, 1081.0.





Objective





- Vital Signs


Vital signs: 


                                   Vital Signs











Temp  98.3 F   05/23/22 14:25


 


Pulse  65   05/23/22 14:25


 


Resp  18   05/23/22 14:25


 


BP  163/72   05/23/22 14:25


 


Pulse Ox  90 L  05/23/22 14:25


 


FiO2      








                                 Intake & Output











 05/22/22 05/23/22 05/23/22





 18:59 06:59 18:59


 


Intake Total  1200 


 


Output Total 1200  


 


Balance -1200 1200 


 


Weight   56.699 kg


 


Intake:   


 


  Intake, IV Titration  900 





  Amount   


 


    Sodium Chloride 0.9% 1,  900 





    000 ml @ 75 mls/hr IV .   





    [id number] [facility] Rx#:[id number]   


 


  Oral  300 


 


Output:   


 


  Urine 1200  


 


Other:   


 


  Voiding Method External Catheter External Catheter External Catheter














- Exam








PHYSICAL EXAM:


VITAL SIGNS: [As above]


GENERAL: Sitting up in bed, alert and oriented 1, to person only, no acute 

distress, less jaundiced,weak


HEENT: Atraumatic, normocephalic, Conjunctivae normal.  Sclera icteric


NECK: Supple No JVD. No thyroid enlargement. No LNs


CARDIOVASCULAR:  S1, S2 regular. No murmur


RESPIRATION: Breath sounds diminished in the bases. No rhonchi or crackles. No 

bronchial breathing.


ABDOMEN:  Soft, nondistended, nontender . No guarding. no masses palpable. No 

ascites, No organomegaly.positive Bowel sounds heard.


LEGS:  No edema. no swelling 


NERVOUS SYSTEM/Psychiatry: Calm, cooperative, Confused, alert and oriented to 

person only-Baseline.


Skin: jaundice, no rashes, warm and dry.








- Labs


CBC & Chem 7: 


                                 05/23/22 05:27





                                 05/23/22 05:27


Labs: 


                  Abnormal Lab Results - Last 24 Hours (Table)











  05/22/22 05/22/22 05/23/22 Range/Units





  14:58 14:58 05:27 


 


WBC    12.19 H  (4.50-10.00)  X 10*3/uL


 


RBC    2.62 L  (4.10-5.20)  X 10*6/uL


 


Hgb    7.4 L  (12.0-15.0)  g/dL


 


Hct    23.9 L  (37.2-46.3)  %


 


MCHC    31.0 L  (32.0-37.0)  g/dL


 


RDW    15.9 H  (11.5-14.5)  %


 


MPV    12.3 H  (9.5-12.2)  fL


 


Immature Gran #    0.24 H  (0.00-0.04)  X 10*3/uL


 


Neutrophils #    8.64 H  (1.80-7.70)  X 10*3/uL


 


Est GFR (CKD-EPI)AfAm     (60.0-200.0)   


 


Est GFR (CKD-EPI)NonAf     (60.0-200.0)   


 


BUN/Creatinine Ratio     (12.00-20.00)  Ratio


 


Glucose     ()  mg/dL


 


Calcium     (8.7-10.3)  mg/dL


 


Transferrin  180.0 L    (204.0-354.0)  mg/dL


 


Ferritin  380.0 H    (10.0-291.0)  ng/mL


 


Total Bilirubin     (0.30-1.20)  mg/dL


 


AST     (13-35)  U/L


 


ALT     (8-44)  U/L


 


Alkaline Phosphatase     ()  U/L


 


Lactate Dehydrogenase  426 H    (120-246)  U/L


 


Total Protein     (6.2-8.2)  g/dL


 


Albumin     (3.8-4.9)  g/dL


 


Albumin/Globulin Ratio     (1.60-3.17)  g/dL


 


CA 19-9 Antigen   1081.0 H   (0.0-34.9)  U/mL


 


Vitamin B12  1783.0 H    (200.0-944.0)  pg/mL


 


Vitamin D 25-Hydroxy  7.9 L    (30.0-100.0)  ng/mL














  05/23/22 Range/Units





  05:27 


 


WBC   (4.50-10.00)  X 10*3/uL


 


RBC   (4.10-5.20)  X 10*6/uL


 


Hgb   (12.0-15.0)  g/dL


 


Hct   (37.2-46.3)  %


 


MCHC   (32.0-37.0)  g/dL


 


RDW   (11.5-14.5)  %


 


MPV   (9.5-12.2)  fL


 


Immature Gran #   (0.00-0.04)  X 10*3/uL


 


Neutrophils #   (1.80-7.70)  X 10*3/uL


 


Est GFR (CKD-EPI)AfAm  49.8 L  (60.0-200.0)   


 


Est GFR (CKD-EPI)NonAf  42.9 L  (60.0-200.0)   


 


BUN/Creatinine Ratio  11.83 L  (12.00-20.00)  Ratio


 


Glucose  147 H  ()  mg/dL


 


Calcium  8.6 L  (8.7-10.3)  mg/dL


 


Transferrin   (204.0-354.0)  mg/dL


 


Ferritin   (10.0-291.0)  ng/mL


 


Total Bilirubin  2.20 H  (0.30-1.20)  mg/dL


 


AST  118 H  (13-35)  U/L


 


ALT  179 H  (8-44)  U/L


 


Alkaline Phosphatase  632 H  ()  U/L


 


Lactate Dehydrogenase   (120-246)  U/L


 


Total Protein  4.8 L  (6.2-8.2)  g/dL


 


Albumin  2.8 L  (3.8-4.9)  g/dL


 


Albumin/Globulin Ratio  1.40 L  (1.60-3.17)  g/dL


 


CA 19-9 Antigen   (0.0-34.9)  U/mL


 


Vitamin B12   (200.0-944.0)  pg/mL


 


Vitamin D 25-Hydroxy   (30.0-100.0)  ng/mL








                      Microbiology - Last 24 Hours (Table)











 05/20/22 18:13 Urine Culture - Final





 Urine,Voided    Klebsiella pneumoniae


 


 05/20/22 14:51 Blood Culture - Preliminary





 Blood    No Growth after 48 hours


 


 05/20/22 15:11 Blood Culture - Preliminary





 Blood    No Growth after 48 hours














Assessment and Plan


Assessment: 








Acute urinary tract infection, Klebsiella pneumoniae


Sepsis secondary to above


Acute kidney injury likely prerenal


Recent ERCP and biliary stent placement at [facility] suspected 

pancreatic cancer.


Hyperbilirubinemia


Elevated liver enzymes


COPD not in exacerbation


Memory impairment/dementia


Hypertension


Hyperlipidemia


Previous history of smoking


CODE STATUS is DNR/DNI





Plan: Continue on current medication regime ,monitoring and symptomatic 

treatment.  Palliative/hospice and peak consulted for informational meeting.  

Continue Rocephin for acute UTI.  Follow closely with oncology.











The impression and plan of care has been dictated as directed.





:


I performed a history and examination of this patient,  discussed the same with 

the dictator.  I agree with the dictator's note ,documented as a scribe.  Any 

additional findings or plans will be noted.

## 2022-05-23 NOTE — P.PN
Subjective


Progress Note Date: 05/23/22





 will see  this evening to discuss assessment further, we are still 

waiting on records [facility]. 





Objective





- Vital Signs


Vital signs: 


                                   Vital Signs











Temp  98.8 F   05/23/22 06:54


 


Pulse  78   05/23/22 06:54


 


Resp  17   05/23/22 06:54


 


BP  146/70   05/23/22 06:54


 


Pulse Ox  96   05/23/22 06:54


 


FiO2      








                                 Intake & Output











 05/22/22 05/23/22 05/23/22





 18:59 06:59 18:59


 


Intake Total  1200 


 


Output Total 1200  


 


Balance -1200 1200 


 


Weight   56.699 kg


 


Intake:   


 


  Intake, IV Titration  900 





  Amount   


 


    Sodium Chloride 0.9% 1,  900 





    000 ml @ 75 mls/hr IV .   





    [id number] [facility] Rx#:[id number]   


 


  Oral  300 


 


Output:   


 


  Urine 1200  


 


Other:   


 


  Voiding Method External Catheter External Catheter External Catheter














- Exam





Jasundice


Weak


Mildly confused


Inapprprpiate but awak


Abdomen no pain


HR Irr


Ext: mild edema





- Labs


CBC & Chem 7: 


                                 05/23/22 05:27





                                 05/23/22 05:27


Labs: 


                  Abnormal Lab Results - Last 24 Hours (Table)











  05/22/22 05/22/22 05/23/22 Range/Units





  14:58 14:58 05:27 


 


WBC    12.19 H  (4.50-10.00)  X 10*3/uL


 


RBC    2.62 L  (4.10-5.20)  X 10*6/uL


 


Hgb    7.4 L  (12.0-15.0)  g/dL


 


Hct    23.9 L  (37.2-46.3)  %


 


MCHC    31.0 L  (32.0-37.0)  g/dL


 


RDW    15.9 H  (11.5-14.5)  %


 


MPV    12.3 H  (9.5-12.2)  fL


 


Immature Gran #    0.24 H  (0.00-0.04)  X 10*3/uL


 


Neutrophils #    8.64 H  (1.80-7.70)  X 10*3/uL


 


Est GFR (CKD-EPI)AfAm     (60.0-200.0)   


 


Est GFR (CKD-EPI)NonAf     (60.0-200.0)   


 


BUN/Creatinine Ratio     (12.00-20.00)  Ratio


 


Glucose     ()  mg/dL


 


Calcium     (8.7-10.3)  mg/dL


 


Transferrin  180.0 L    (204.0-354.0)  mg/dL


 


Ferritin  380.0 H    (10.0-291.0)  ng/mL


 


Total Bilirubin     (0.30-1.20)  mg/dL


 


AST     (13-35)  U/L


 


ALT     (8-44)  U/L


 


Alkaline Phosphatase     ()  U/L


 


Lactate Dehydrogenase  426 H    (120-246)  U/L


 


Total Protein     (6.2-8.2)  g/dL


 


Albumin     (3.8-4.9)  g/dL


 


Albumin/Globulin Ratio     (1.60-3.17)  g/dL


 


CA 19-9 Antigen   1081.0 H   (0.0-34.9)  U/mL


 


Vitamin B12  1783.0 H    (200.0-944.0)  pg/mL


 


Vitamin D 25-Hydroxy  7.9 L    (30.0-100.0)  ng/mL














  05/23/22 Range/Units





  05:27 


 


WBC   (4.50-10.00)  X 10*3/uL


 


RBC   (4.10-5.20)  X 10*6/uL


 


Hgb   (12.0-15.0)  g/dL


 


Hct   (37.2-46.3)  %


 


MCHC   (32.0-37.0)  g/dL


 


RDW   (11.5-14.5)  %


 


MPV   (9.5-12.2)  fL


 


Immature Gran #   (0.00-0.04)  X 10*3/uL


 


Neutrophils #   (1.80-7.70)  X 10*3/uL


 


Est GFR (CKD-EPI)AfAm  49.8 L  (60.0-200.0)   


 


Est GFR (CKD-EPI)NonAf  42.9 L  (60.0-200.0)   


 


BUN/Creatinine Ratio  11.83 L  (12.00-20.00)  Ratio


 


Glucose  147 H  ()  mg/dL


 


Calcium  8.6 L  (8.7-10.3)  mg/dL


 


Transferrin   (204.0-354.0)  mg/dL


 


Ferritin   (10.0-291.0)  ng/mL


 


Total Bilirubin  2.20 H  (0.30-1.20)  mg/dL


 


AST  118 H  (13-35)  U/L


 


ALT  179 H  (8-44)  U/L


 


Alkaline Phosphatase  632 H  ()  U/L


 


Lactate Dehydrogenase   (120-246)  U/L


 


Total Protein  4.8 L  (6.2-8.2)  g/dL


 


Albumin  2.8 L  (3.8-4.9)  g/dL


 


Albumin/Globulin Ratio  1.40 L  (1.60-3.17)  g/dL


 


CA 19-9 Antigen   (0.0-34.9)  U/mL


 


Vitamin B12   (200.0-944.0)  pg/mL


 


Vitamin D 25-Hydroxy   (30.0-100.0)  ng/mL








                      Microbiology - Last 24 Hours (Table)











 05/20/22 18:13 Urine Culture - Final





 Urine,Voided    Klebsiella pneumoniae


 


 05/20/22 14:51 Blood Culture - Preliminary





 Blood    No Growth after 48 hours


 


 05/20/22 15:11 Blood Culture - Preliminary





 Blood    No Growth after 48 hours














Assessment and Plan


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


Current Visit: Yes   Status: Acute   Code(s): C25.9 - MALIGNANT NEOPLASM OF 

PANCREAS, UNSPECIFIED; C77.2 - SECONDARY AND UNSP MALIGNANT NEOPLASM OF INTRA-

ABD NODES   SNOMED Code(s): 208457230


   





(2) Normocytic anemia


Narrative/Plan: 


no evidence of iron deficiency, no supplementation to be given, likely related 

to intrabdominal mets and malignancy


Current Visit: Yes   Status: Acute   Code(s): D64.9 - ANEMIA, UNSPECIFIED   

SNOMED Code(s): 952676490


   





(3) Leukocytosis


Current Visit: Yes   Status: Acute   Code(s): D72.829 - ELEVATED WHITE BLOOD 

CELL COUNT, UNSPECIFIED   SNOMED Code(s): 600086337


   





(4) Biliary obstruction


Current Visit: No   Status: Acute   Code(s): K83.1 - OBSTRUCTION OF BILE DUCT   

SNOMED Code(s): 747828268


   





(5) Jaundice


Current Visit: No   Status: Acute   Code(s): R17 - UNSPECIFIED JAUNDICE   SNOMED

Code(s): 16057426


   


Plan: 





Treatment of potential infectious etiology and IV hydration for dehydration


Await records (requested) for further recs on new diagnosis of pancreatic 

cancer, Dr. Fox will bethis evening








Dr. Kurtz: I have completed the full history and physicl and developed the 

above impression and plan, agree with dictation, dictated as a scribe

## 2022-05-24 RX ADMIN — MEMANTINE HYDROCHLORIDE SCH MG: 5 TABLET ORAL at 08:28

## 2022-05-24 RX ADMIN — PANTOPRAZOLE SODIUM SCH MG: 40 INJECTION, POWDER, FOR SOLUTION INTRAVENOUS at 14:56

## 2022-05-24 RX ADMIN — MEMANTINE HYDROCHLORIDE SCH MG: 5 TABLET ORAL at 20:32

## 2022-05-24 RX ADMIN — HEPARIN SODIUM SCH UNIT: 5000 INJECTION INTRAVENOUS; SUBCUTANEOUS at 08:29

## 2022-05-24 RX ADMIN — HEPARIN SODIUM SCH UNIT: 5000 INJECTION INTRAVENOUS; SUBCUTANEOUS at 00:47

## 2022-05-24 RX ADMIN — CEFAZOLIN SCH MLS/HR: 330 INJECTION, POWDER, FOR SOLUTION INTRAMUSCULAR; INTRAVENOUS at 07:41

## 2022-05-24 RX ADMIN — HEPARIN SODIUM SCH UNIT: 5000 INJECTION INTRAVENOUS; SUBCUTANEOUS at 23:29

## 2022-05-24 RX ADMIN — HEPARIN SODIUM SCH UNIT: 5000 INJECTION INTRAVENOUS; SUBCUTANEOUS at 15:26

## 2022-05-24 RX ADMIN — ASPIRIN 81 MG CHEWABLE TABLET SCH MG: 81 TABLET CHEWABLE at 08:28

## 2022-05-24 RX ADMIN — CEFAZOLIN SCH MLS/HR: 330 INJECTION, POWDER, FOR SOLUTION INTRAMUSCULAR; INTRAVENOUS at 23:29

## 2022-05-24 RX ADMIN — CHOLECALCIFEROL TAB 125 MCG (5000 UNIT) SCH MCG: 125 TAB at 08:28

## 2022-05-24 RX ADMIN — CLOPIDOGREL BISULFATE SCH MG: 75 TABLET ORAL at 08:28

## 2022-05-24 NOTE — P.PN
Subjective


Progress Note Date: 05/24/22


Principal diagnosis: 





pancreatic adenocarcinoma





Patient is resting comfortably, not very communicative,  is at the 

bedside he does have her documents.





Objective





- Vital Signs


Vital signs: 


                                   Vital Signs











Temp  97.2 F L  05/24/22 07:29


 


Pulse  74   05/24/22 07:29


 


Resp  20   05/24/22 07:29


 


BP  152/78   05/24/22 07:29


 


Pulse Ox  99   05/24/22 07:29


 


FiO2      








                                 Intake & Output











 05/23/22 05/24/22 05/24/22





 18:59 06:59 18:59


 


Intake Total  600 


 


Output Total 800  


 


Balance -800 600 


 


Weight 56.699 kg  


 


Intake:   


 


  Intake, IV Titration  600 





  Amount   


 


    Sodium Chloride 0.9% 1,  600 





    000 ml @ 75 mls/hr IV .   





    [id number] [facility] Rx#:[id number]   


 


Output:   


 


  Urine 800  


 


Other:   


 


  Voiding Method External Catheter  External Catheter














- Constitutional


General appearance: Present: average body habitus, cooperative, no acute 

distress





- EENT


Eyes: Present: anicteric sclerae, EOMI





- Respiratory


Details: 





respirations even and unlabored





- Musculoskeletal


Musculoskeletal: Present: generalized weakness





- Psychiatric


Psychiatric: Present: A&O x's 3





- Labs


CBC & Chem 7: 


                                 05/23/22 05:27





                                 05/23/22 05:27


Labs: 


                      Microbiology - Last 24 Hours (Table)











 05/20/22 14:51 Blood Culture - Preliminary





 Blood    No Growth after 72 hours


 


 05/20/22 15:11 Blood Culture - Preliminary





 Blood    No Growth after 72 hours


 


 05/20/22 18:13 Urine Culture - Final





 Urine,Voided    Klebsiella pneumoniae














Assessment and Plan


(1) History of pancreatic cancer


Current Visit: Yes   Status: Acute   Priority: High   Code(s): Z85.07 - PERSONAL

HISTORY OF MALIGNANT NEOPLASM OF PANCREAS   SNOMED Code(s): 37940091542009


   


Plan: 





Dr. Fox reviewed the records that the patient  had available.  There is a

path report confirming a pancreatic primary.  Have asked Nursing to borrow these

documents in have them scanned into the patient's electronic medical record.  

Communication order requesting imaging and op notes.  





Dr. Fox has been keeping to be medication with patient has been.  Further 

recommendations to follow.





Agree at this time with treatment of urinary tract infection.





 Attests:  I have performed H&P, seen and examined pt, developed impression 

and plan of care.  Discussed with dictator.  Agree with dictation, documented as

a scribe.  








Time with Patient: Greater than 30

## 2022-05-24 NOTE — P.PN
Subjective


Progress Note Date: 05/24/22


Patient is a 81-year-old female with a known history of hypertension, 

hyperlipidemia, COPD, dementia and previous history of smoking and recent ERCP 

and stent placement and fine-needle aspiration biopsy of the pancreatic mass 

suspected cancer at [facility] was brought to hospital by her  

due to generalized weakness and tiredness.  Patient has not been eating or 

drinking since morning and has episode of vomiting.  She was also slightly 

confused than normal.  And has been less expressive and responsive.  No fever no

chills.  No chest pain or shortness of breath.  No diarrhea.  No cough or sputum

production.  Patient has been evaluated at home.  Patient does complain of 

occasional abdominal pain as per her .





Patient was discharged from [facility] about a week ago where he had 

biliary stent placement and fine-needle aspiration biopsy was done and suspected

pancreatic cancer.  Biopsy report is pending at this time.





5/23/22 less jaundiced, denies pain.  Maintained on ceftriaxone for UTI.  

Afebrile, WBC decreased to 12.19.  Hemoglobin decreased to 7.4, platelets 268, T

bili 2.2, , , alk phos 632.  CA-19-9-9 antigen elevated, 1081.0.





5/24/22 [facility] Reports pending. Family meeting with oncology pending.  Urine

culture reported Klebsiella pneumonia, continues on IV piggyback ceftriaxone.  

Afebrile.  Pleasantly confused.  Diet intake improving consuming %.





Objective





- Vital Signs


Vital signs: 


                                   Vital Signs











Temp  97.2 F L  05/24/22 07:29


 


Pulse  74   05/24/22 07:29


 


Resp  20   05/24/22 07:29


 


BP  152/78   05/24/22 07:29


 


Pulse Ox  99   05/24/22 07:29


 


FiO2      








                                 Intake & Output











 05/23/22 05/24/22 05/24/22





 18:59 06:59 18:59


 


Intake Total  600 


 


Output Total 800  


 


Balance -800 600 


 


Weight 56.699 kg  


 


Intake:   


 


  Intake, IV Titration  600 





  Amount   


 


    Sodium Chloride 0.9% 1,  600 





    000 ml @ 75 mls/hr IV .   





    [id number] [facility][id number]   


 


Output:   


 


  Urine 800  


 


Other:   


 


  Voiding Method External Catheter  External Catheter














- Exam








PHYSICAL EXAM:


VITAL SIGNS: [As above]


GENERAL: Sitting up in bed, weak,alert and oriented 1, to person only, no acute

distress, less jaundiced


HEENT: Atraumatic, normocephalic, Conjunctivae normal.  Sclera icteric


NECK: Supple No JVD. No thyroid enlargement. No LNs


CARDIOVASCULAR:  S1, S2 regular. No murmur


RESPIRATION: Breath sounds diminished in the bases. 


ABDOMEN:  Soft, nondistended, nontender . No guarding. no masses palpable. 

positive Bowel sounds heard.


LEGS:  No edema. no swelling 


NERVOUS SYSTEM/Psychiatry: Calm, cooperative, Confused, alert and oriented to 

person only-Baseline.


Skin: jaundice, no rashes, warm and dry.








- Labs


CBC & Chem 7: 


                                 05/23/22 05:27





                                 05/23/22 05:27


Labs: 


                      Microbiology - Last 24 Hours (Table)











 05/20/22 14:51 Blood Culture - Preliminary





 Blood    No Growth after 72 hours


 


 05/20/22 15:11 Blood Culture - Preliminary





 Blood    No Growth after 72 hours


 


 05/20/22 18:13 Urine Culture - Final





 Urine,Voided    Klebsiella pneumoniae














Assessment and Plan


Assessment: 








Acute urinary tract infection, Klebsiella pneumoniae


Sepsis secondary to above


Acute kidney injury likely prerenal


Recent ERCP and biliary stent placement at [facility] suspected 

pancreatic cancer.


Hyperbilirubinemia


Elevated liver enzymes


COPD not in exacerbation


Memory impairment/dementia


Hypertension


Hyperlipidemia


Previous history of smoking


CODE STATUS is DNR/DNI





Plan: Continue on current medication regime ,monitoring and symptomatic 

treatment.  Maintain Rocephin for acute UTI. Oncology meeting pending arrival of

[facility] reports.  Prognosis guarded given multiple complex medical issues.











The impression and plan of care has been dictated as directed.





:


I performed a history and examination of this patient,  discussed the same with 

the dictator.  I agree with the dictator's note ,documented as a scribe.  Any 

additional findings or plans will be noted.

## 2022-05-24 NOTE — P.PN
Subjective


Progress Note Date: 05/24/22


Principal diagnosis: 


weakness





Patient is a 81-year-old female with a known history of hypertension, 

hyperlipidemia, COPD, dementia and previous history of smoking and recent ERCP a

nd stent placement and fine-needle aspiration biopsy of the pancreatic mass 

suspected cancer at [facility] was brought to hospital by her  

due to generalized weakness and tiredness.  Patient has not been eating or 

drinking since morning and has episode of vomiting.  She was also slightly more 

confused than normal.  And has been less expressive and responsive.  No fever no

chills.  No chest pain or shortness of breath.  No diarrhea.  No cough or sputum

production.  Patient has been evaluated at home.  Patient does complain of 

occasional abdominal pain as per her .





Patient was discharged from [facility] about a week ago where he had 

biliary stent placement and fine-needle aspiration biopsy was done. She was 

diagnosis with pancreatic cancer and was scheduled to see Dr. Fox in office on 

June 8th, however is now admitted. She was found to have possible UTI, treated. 

She is receiving IV Fluids and has become more awake per . The details of

her cancer are not known as the records from [facility] have not yet been sent








Objective





- Vital Signs


Vital signs: 


                                   Vital Signs











Temp  98.5 F   05/24/22 14:00


 


Pulse  73   05/24/22 14:00


 


Resp  20   05/24/22 14:00


 


BP  166/78   05/24/22 14:00


 


Pulse Ox  99   05/24/22 14:00


 


FiO2      








                                 Intake & Output











 05/23/22 05/24/22 05/24/22





 18:59 06:59 18:59


 


Intake Total  600 


 


Output Total 800  


 


Balance -800 600 


 


Weight 56.699 kg  


 


Intake:   


 


  Intake, IV Titration  600 





  Amount   


 


    Sodium Chloride 0.9% 1,  600 





    000 ml @ 75 mls/hr IV .   





    [id number] [facility] Rx#:[id number]   


 


Output:   


 


  Urine 800  


 


Other:   


 


  Voiding Method External Catheter  External Catheter














- Exam


General: Patient awake alert. Oriented x 1, to person.  No acute distress.


HEENT: Head is atraumatic, normocephalic Neck is supple. Sclerae are clear. 

Pupils equal, round and reactive to light bilaterally.  


CV: Heart regular in rate and rhythm positive S1 and S2.  No S3. No S4.  No 

clicks, rubs or murmurs. No JVD. Peripheral pulses equal. 2/4


Lungs: Clear to auscultation bilaterally.  No wheezes rales or rhonchi. 

Respirations even and nonlabored. No intercostal retractions.


Abdomen/GI: Soft. Bowel sounds present in all 4 quadrants. Bowel sounds 

normoactive. No abdominal tenderness.  


Musculoskeletal/ Extremities: No tenderness on muscular exam.  No ecchymosis.


Vascular: Radial pulses equal. 2/4.


Skin: No rash. + slight jaundice


Neurologic: Awake, alert and oriented times 1. 


Psychiatric: Appropriate mood, flat affect.








- Labs


CBC & Chem 7: 


                                 05/23/22 05:27





                                 05/23/22 05:27


Labs: 


                      Microbiology - Last 24 Hours (Table)











 05/20/22 14:51 Blood Culture - Preliminary





 Blood    No Growth after 72 hours


 


 05/20/22 15:11 Blood Culture - Preliminary





 Blood    No Growth after 72 hours


 


 05/20/22 18:13 Urine Culture - Final





 Urine,Voided    Klebsiella pneumoniae














Assessment and Plan


Assessment: 


Symptoms


* Pain - 0/10


* Fatigue - yes


* weakness - yes


* SOB - no


* Insomnia - no


* N/V - yes


* Anxiety - no


* Depression - no


* Confusion - yes, continue namenda


* Agitation - no


* Hallucinations - no


* Appetite/weight loss - decreased appetite since biliary stent placement, 5-7lb

  weight loss in past 3 weeks, appetite improving today


* Dysphagia - no


* Constipation - no, continue colace prn


* Incontinence - yes, wears briefs


* Itch - no





Plan: 


Summary/Goals - The patient is resting in bed and appears comfortable. Her 

 states her mentation is clearing someone and she is back to her baseline

which is A&O x 1.  He also states that her appetite is improving.  Awaiting 

records from [facility] for further recs from oncology on new diagnosis of 

pancreatic cancer. Patient's  would like to speak to Dr. Fox before 

trying to establish goals of care.  Will follow up with patient and her  

tomorrow. 





Advanced Directives - information given





Code Status - DNR





Thank you for this consult





Nicci Cowan [facility]


Palliative Care


[facility] [id number]


Email: [email or web address]








Time with Patient: Less than 30

## 2022-05-25 LAB
ACANTHOCYTES BLD QL SMEAR: (no result)
ALBUMIN SERPL-MCNC: 2.9 G/DL (ref 3.8–4.9)
ALBUMIN/GLOB SERPL: 1.45 G/DL (ref 1.6–3.17)
ALP SERPL-CCNC: 544 U/L (ref 41–126)
ALT SERPL-CCNC: 147 U/L (ref 8–44)
ANION GAP SERPL CALC-SCNC: 9.5 MMOL/L (ref 10–18)
ANISOCYTOSIS BLD QL SMEAR: (no result)
AST SERPL-CCNC: 65 U/L (ref 13–35)
BASOPHILS # BLD AUTO: 0.03 X 10*3/UL (ref 0–0.1)
BASOPHILS NFR BLD AUTO: 0.2 %
BUN SERPL-SCNC: 20 MG/DL (ref 9–27)
BUN/CREAT SERPL: 15.38 RATIO (ref 12–20)
CALCIUM SPEC-MCNC: 8.8 MG/DL (ref 8.7–10.3)
CHLORIDE SERPL-SCNC: 108 MMOL/L (ref 96–109)
CO2 SERPL-SCNC: 22.5 MMOL/L (ref 20–27.5)
EOSINOPHIL # BLD AUTO: 0.29 X 10*3/UL (ref 0.04–0.35)
EOSINOPHIL NFR BLD AUTO: 2.2 %
ERYTHROCYTE [DISTWIDTH] IN BLOOD BY AUTOMATED COUNT: 2.61 X 10*6/UL (ref 4.1–5.2)
ERYTHROCYTE [DISTWIDTH] IN BLOOD: 16.2 % (ref 11.5–14.5)
GLOBULIN SER CALC-MCNC: 2 G/DL (ref 1.6–3.3)
GLUCOSE SERPL-MCNC: 239 MG/DL (ref 70–110)
HCT VFR BLD AUTO: 23.7 % (ref 37.2–46.3)
HGB BLD-MCNC: 7.5 G/DL (ref 12–15)
IMM GRANULOCYTES BLD QL AUTO: 4.6 %
LYMPHOCYTES # SPEC AUTO: 2.21 X 10*3/UL (ref 0.9–5)
LYMPHOCYTES NFR SPEC AUTO: 17.1 %
MCH RBC QN AUTO: 28.7 PG (ref 27–32)
MCHC RBC AUTO-ENTMCNC: 31.6 G/DL (ref 32–37)
MCV RBC AUTO: 90.8 FL (ref 80–97)
MONOCYTES # BLD AUTO: 1.16 X 10*3/UL (ref 0.2–1)
MONOCYTES NFR BLD AUTO: 9 %
NEUTROPHILS # BLD AUTO: 8.67 X 10*3/UL (ref 1.8–7.7)
NEUTROPHILS NFR BLD AUTO: 66.9 %
NRBC BLD AUTO-RTO: 0 /100 WBCS (ref 0–0)
PLATELET # BLD AUTO: 283 X 10*3/UL (ref 140–440)
POTASSIUM SERPL-SCNC: 3.9 MMOL/L (ref 3.5–5.5)
PROT SERPL-MCNC: 4.9 G/DL (ref 6.2–8.2)
SODIUM SERPL-SCNC: 140 MMOL/L (ref 135–145)
WBC # BLD AUTO: 12.96 X 10*3/UL (ref 4.5–10)

## 2022-05-25 RX ADMIN — MEMANTINE HYDROCHLORIDE SCH MG: 5 TABLET ORAL at 09:22

## 2022-05-25 RX ADMIN — PANTOPRAZOLE SODIUM SCH MG: 40 INJECTION, POWDER, FOR SOLUTION INTRAVENOUS at 10:47

## 2022-05-25 RX ADMIN — ASPIRIN 81 MG CHEWABLE TABLET SCH MG: 81 TABLET CHEWABLE at 09:22

## 2022-05-25 RX ADMIN — HEPARIN SODIUM SCH UNIT: 5000 INJECTION INTRAVENOUS; SUBCUTANEOUS at 15:09

## 2022-05-25 RX ADMIN — CEFAZOLIN SCH MLS/HR: 330 INJECTION, POWDER, FOR SOLUTION INTRAMUSCULAR; INTRAVENOUS at 12:13

## 2022-05-25 RX ADMIN — MAGNESIUM HYDROXIDE SCH MG: 2400 SUSPENSION ORAL at 21:10

## 2022-05-25 RX ADMIN — CLOPIDOGREL BISULFATE SCH MG: 75 TABLET ORAL at 09:22

## 2022-05-25 RX ADMIN — CHOLECALCIFEROL TAB 125 MCG (5000 UNIT) SCH MCG: 125 TAB at 09:22

## 2022-05-25 RX ADMIN — HEPARIN SODIUM SCH UNIT: 5000 INJECTION INTRAVENOUS; SUBCUTANEOUS at 09:22

## 2022-05-25 RX ADMIN — MEMANTINE HYDROCHLORIDE SCH MG: 5 TABLET ORAL at 21:09

## 2022-05-25 NOTE — P.PN
Subjective


Progress Note Date: 05/25/22


Principal diagnosis: 


UTI





Patient is a 81-year-old female with a known history of hypertension, 

hyperlipidemia, COPD, dementia and previous history of smoking and recent ERCP 

and stent placement and fine-needle aspiration biopsy of the pancreatic mass 

suspected cancer at [facility] was brought to hospital by her  

due to generalized weakness and tiredness.  Patient has not been eating or 

drinking since morning and has episode of vomiting.  She was also slightly more 

confused than normal.  And has been less expressive and responsive.  No fever no

chills.  No chest pain or shortness of breath.  No diarrhea.  No cough or sputum

production.  Patient has been evaluated at home.  Patient does complain of 

occasional abdominal pain as per her .





Patient was discharged from [facility] about a week ago where he had 

biliary stent placement and fine-needle aspiration biopsy was done. She was 

diagnosis with pancreatic cancer and was scheduled to see Dr. Fox in office on 

June 8th, however is now admitted. She was found to have possible UTI, treated. 

She is receiving IV Fluids and has become more awake per . The details of

her cancer are not known as the records from [facility] have not yet been sent.





5/24  The patient is resting in bed and appears comfortable. Her  states 

her mentation is clearing someone and she is back to her baseline which is A&O x

1.  He also states that her appetite is improving.  Awaiting records from [facility] for further recs from oncology on new diagnosis of pancreatic cancer. 

Patient's  would like to speak to Dr. Fox before trying to establish 

goals of care.  Will follow up with patient and her  tomorrow. 








Objective





- Vital Signs


Vital signs: 


                                   Vital Signs











Temp  98.6 F   05/25/22 14:26


 


Pulse  75   05/25/22 14:26


 


Resp  17   05/25/22 14:26


 


BP  133/72   05/25/22 14:26


 


Pulse Ox  99   05/25/22 14:26


 


FiO2      








                                 Intake & Output











 05/24/22 05/25/22 05/25/22





 18:59 06:59 18:59


 


Intake Total 1080 900 


 


Output Total  500 


 


Balance 1080 400 


 


Intake:   


 


  Intake, IV Titration  900 





  Amount   


 


    Sodium Chloride 0.9% 1,  900 





    000 ml @ 75 mls/hr IV .   





    [id number] [facility] Rx#:[id number]   


 


  Oral 1080  


 


Output:   


 


  Urine  500 


 


Other:   


 


  Voiding Method External Catheter External Catheter 














- Exam


General: Patient awake alert. Oriented x 1, to person.  No acute distress.


HEENT: Head is atraumatic, normocephalic Neck is supple. Sclerae are clear. 

Pupils equal, round and reactive to light bilaterally.  


CV: Heart regular in rate and rhythm positive S1 and S2.  No S3. No S4.  No 

clicks, rubs or murmurs. No JVD. Peripheral pulses equal. 2/4


Lungs: Clear to auscultation bilaterally.  No wheezes rales or rhonchi. 

Respirations even and nonlabored. No intercostal retractions.


Abdomen/GI: Soft. Bowel sounds present in all 4 quadrants. Bowel sounds 

normoactive. No abdominal tenderness.  


Musculoskeletal/ Extremities: No tenderness on muscular exam.  No ecchymosis.


Vascular: Radial pulses equal. 2/4.


Skin: No rash. + slight jaundice


Neurologic: Awake, alert and oriented times 1. 


Psychiatric: Appropriate mood, flat affect.








- Labs


CBC & Chem 7: 


                                 05/25/22 04:46





                                 05/25/22 04:46


Labs: 


                  Abnormal Lab Results - Last 24 Hours (Table)











  05/25/22 05/25/22 Range/Units





  04:46 04:46 


 


WBC  12.96 H   (4.50-10.00)  X 10*3/uL


 


RBC  2.61 L   (4.10-5.20)  X 10*6/uL


 


Hgb  7.5 L   (12.0-15.0)  g/dL


 


Hct  23.7 L   (37.2-46.3)  %


 


MCHC  31.6 L   (32.0-37.0)  g/dL


 


RDW  16.2 H   (11.5-14.5)  %


 


Immature Gran #  0.60 H   (0.00-0.04)  X 10*3/uL


 


Neutrophils #  8.67 H   (1.80-7.70)  X 10*3/uL


 


Monocytes #  1.16 H   (0.20-1.00)  X 10*3/uL


 


Anion Gap   9.50 L  (10.00-18.00)  mmol/L


 


Est GFR (CKD-EPI)AfAm   45.2 L  (60.0-200.0)   


 


Est GFR (CKD-EPI)NonAf   39.0 L  (60.0-200.0)   


 


Glucose   239 H  ()  mg/dL


 


Total Bilirubin   1.90 H  (0.30-1.20)  mg/dL


 


AST   65 H  (13-35)  U/L


 


ALT   147 H  (8-44)  U/L


 


Alkaline Phosphatase   544 H  ()  U/L


 


Total Protein   4.9 L  (6.2-8.2)  g/dL


 


Albumin   2.9 L  (3.8-4.9)  g/dL


 


Albumin/Globulin Ratio   1.45 L  (1.60-3.17)  g/dL








                      Microbiology - Last 24 Hours (Table)











 05/20/22 14:51 Blood Culture - Preliminary





 Blood    No Growth after 96 hours


 


 05/20/22 15:11 Blood Culture - Preliminary





 Blood    No Growth after 96 hours














Assessment and Plan


Assessment: 


Symptoms


* Pain - 0/10


* Fatigue - yes


* weakness - yes


* SOB - no


* Insomnia - no


* N/V - yes


* Anxiety - no


* Depression - no


* Confusion - yes, continue namenda


* Agitation - no


* Hallucinations - no


* Appetite/weight loss - decreased appetite since biliary stent placement, 5-7lb

  weight loss in past 3 weeks, appetite improving today


* Dysphagia - no


* Constipation - no, continue colace prn


* Incontinence - yes, wears briefs


* Itch - no





Plan: 


Summary/Goals - The patient is sleeping in bed and appears comfortable.   He 

states that her appetite is improving.  Awaiting records from [facility] for 

further recs from oncology on new diagnosis of pancreatic cancer. Patient's 

 would like to speak to Dr. Fox before trying to establish goals of care.

 Will follow up with patient and her  tomorrow. 





Advanced Directives - information given





Code Status - DNR





Thank you for this consult





Nicci Cowan [facility]


Palliative Care


[facility] [id number]


Email: [email or web address]








Time with Patient: Less than 30

## 2022-05-25 NOTE — PN
PROGRESS NOTE



DATE OF SERVICE:

05/25/2002



This 81-year-old woman was admitted with  UTI and is being closely monitored.  The

urine culture grew Klebsiella.  The patient is on IV Rocephin at this time.  There is

no history of fever, rigors, chills at this time.



PHYSICAL EXAMINATION:

Pulse 74, blood pressure 147/75, respirations 16.

HEENT: Conjunctivae normal. Neck: No JVD. Cardiovascular: S1, S2 muffled. Respiration:

Breath sounds diminished in the bases. A few scattered rhonchi.

Abdomen:  Soft, nontender. Nervous system: No focal deficits.



LABS:

Reviewed.  Hemoglobin 7.5.



ASSESSMENT:

1. Acute urinary tract infection with Klebsiella pneumonia and sepsis.

2. Acute kidney injury.

3. Hyperbilirubinemia.

4. Recent ERCP and biliary stent placement at [facility] for pancreatic

    cancer.

5. Chronic obstructive pulmonary disease.



RECOMMENDATIONS AND DISCUSSION:

Recommend to continue current treatment, continue antibiotics.  Infectious Disease

evaluation.  Repeat labs in the morning and we will also follow the patient closely

with Hematology/Oncology also.  Guarded prognosis.  Further recommendations to follow.





MMODL / IJN: [id number] / Job#: [id number]

## 2022-05-26 LAB
ACANTHOCYTES BLD QL SMEAR: (no result)
ALBUMIN SERPL-MCNC: 2.8 G/DL (ref 3.8–4.9)
ALBUMIN/GLOB SERPL: 1.52 G/DL (ref 1.6–3.17)
ALP SERPL-CCNC: 451 U/L (ref 41–126)
ALT SERPL-CCNC: 117 U/L (ref 8–44)
ANION GAP SERPL CALC-SCNC: 7.6 MMOL/L (ref 10–18)
ANISOCYTOSIS BLD QL SMEAR: (no result)
AST SERPL-CCNC: 47 U/L (ref 13–35)
BASOPHILS # BLD MANUAL: 0 X 10*3/UL (ref 0–0.1)
BUN SERPL-SCNC: 24.4 MG/DL (ref 9–27)
BUN/CREAT SERPL: 22.18 RATIO (ref 12–20)
CALCIUM SPEC-MCNC: 8.8 MG/DL (ref 8.7–10.3)
CHLORIDE SERPL-SCNC: 109 MMOL/L (ref 96–109)
CO2 SERPL-SCNC: 23.4 MMOL/L (ref 20–27.5)
EOSINOPHIL # BLD MANUAL: 0.25 X 10*3/UL (ref 0.04–0.35)
ERYTHROCYTE [DISTWIDTH] IN BLOOD BY AUTOMATED COUNT: 2.33 X 10*6/UL (ref 4.1–5.2)
ERYTHROCYTE [DISTWIDTH] IN BLOOD: 16.4 % (ref 11.5–14.5)
GLOBULIN SER CALC-MCNC: 1.9 G/DL (ref 1.6–3.3)
GLUCOSE SERPL-MCNC: 252 MG/DL (ref 70–110)
HCT VFR BLD AUTO: 21.5 % (ref 37.2–46.3)
HGB BLD-MCNC: 6.8 G/DL (ref 12–15)
LYMPHOCYTES # BLD MANUAL: 1.74 X 10*3/UL (ref 0.9–5)
MAGNESIUM SPEC-SCNC: 2.3 MG/DL (ref 1.5–2.4)
MCH RBC QN AUTO: 29.2 PG (ref 27–32)
MCHC RBC AUTO-ENTMCNC: 31.6 G/DL (ref 32–37)
MCV RBC AUTO: 92.3 FL (ref 80–97)
MICROCYTES BLD QL SMEAR: (no result)
MONOCYTES # BLD MANUAL: 0.5 X 10*3/UL (ref 0.2–1)
NEUTROPHILS NFR BLD MANUAL: 74 %
NEUTS SEG # BLD MANUAL: 9.22 X 10*3/UL (ref 2–8.9)
NRBC BLD AUTO-RTO: 0 /100 WBCS (ref 0–0)
PLATELET # BLD AUTO: 277 X 10*3/UL (ref 140–440)
POTASSIUM SERPL-SCNC: 4.1 MMOL/L (ref 3.5–5.5)
PROT SERPL-MCNC: 4.7 G/DL (ref 6.2–8.2)
SICKLE CELLS BLD QL SMEAR: (no result)
SODIUM SERPL-SCNC: 140 MMOL/L (ref 135–145)
WBC # BLD AUTO: 12.46 X 10*3/UL (ref 4.5–10)

## 2022-05-26 PROCEDURE — 30233H1 TRANSFUSION OF NONAUTOLOGOUS WHOLE BLOOD INTO PERIPHERAL VEIN, PERCUTANEOUS APPROACH: ICD-10-PCS

## 2022-05-26 RX ADMIN — HEPARIN SODIUM SCH UNIT: 5000 INJECTION INTRAVENOUS; SUBCUTANEOUS at 09:49

## 2022-05-26 RX ADMIN — CEFAZOLIN SCH: 330 INJECTION, POWDER, FOR SOLUTION INTRAMUSCULAR; INTRAVENOUS at 15:17

## 2022-05-26 RX ADMIN — MEMANTINE HYDROCHLORIDE SCH MG: 5 TABLET ORAL at 09:50

## 2022-05-26 RX ADMIN — CHOLECALCIFEROL TAB 125 MCG (5000 UNIT) SCH MCG: 125 TAB at 09:49

## 2022-05-26 RX ADMIN — HEPARIN SODIUM SCH UNIT: 5000 INJECTION INTRAVENOUS; SUBCUTANEOUS at 00:22

## 2022-05-26 RX ADMIN — CLOPIDOGREL BISULFATE SCH MG: 75 TABLET ORAL at 09:50

## 2022-05-26 RX ADMIN — ASPIRIN 81 MG CHEWABLE TABLET SCH MG: 81 TABLET CHEWABLE at 09:50

## 2022-05-26 RX ADMIN — MAGNESIUM HYDROXIDE SCH MG: 2400 SUSPENSION ORAL at 21:01

## 2022-05-26 RX ADMIN — HEPARIN SODIUM SCH UNIT: 5000 INJECTION INTRAVENOUS; SUBCUTANEOUS at 23:18

## 2022-05-26 RX ADMIN — CEFAZOLIN SCH: 330 INJECTION, POWDER, FOR SOLUTION INTRAMUSCULAR; INTRAVENOUS at 23:33

## 2022-05-26 RX ADMIN — HEPARIN SODIUM SCH UNIT: 5000 INJECTION INTRAVENOUS; SUBCUTANEOUS at 16:45

## 2022-05-26 RX ADMIN — MEMANTINE HYDROCHLORIDE SCH MG: 5 TABLET ORAL at 21:01

## 2022-05-26 RX ADMIN — CEFAZOLIN SCH: 330 INJECTION, POWDER, FOR SOLUTION INTRAMUSCULAR; INTRAVENOUS at 10:51

## 2022-05-26 RX ADMIN — PANTOPRAZOLE SODIUM SCH: 40 INJECTION, POWDER, FOR SOLUTION INTRAVENOUS at 15:36

## 2022-05-26 NOTE — P.PN
Subjective


Progress Note Date: 05/26/22





Hemo globin 6.8 - One unit PRBC 





Objective





- Vital Signs


Vital signs: 


                                   Vital Signs











Temp  98.3 F   05/26/22 07:43


 


Pulse  82   05/26/22 07:43


 


Resp  16   05/26/22 07:43


 


BP  114/69   05/26/22 07:43


 


Pulse Ox  98   05/26/22 07:43


 


FiO2      








                                 Intake & Output











 05/25/22 05/26/22 05/26/22





 18:59 06:59 18:59


 


Intake Total  875 


 


Output Total 950 400 


 


Balance -950 475 


 


Intake:   


 


  Intake, IV Titration  875 





  Amount   


 


    Sodium Chloride 0.9% 1,  875 





    000 ml @ 75 mls/hr IV .   





    [id number] [facility] Rx#:[id number]   


 


Output:   


 


  Urine 950 400 


 


Other:   


 


  Voiding Method  External Catheter 














- Exam





Jasundice


Weak


Mildly confused


Inapprprpiate but awak


Abdomen no pain


HR Irr


Ext: mild edema





- Labs


CBC & Chem 7: 


                                 05/26/22 05:54





                                 05/26/22 05:54


Labs: 


                  Abnormal Lab Results - Last 24 Hours (Table)











  05/25/22 05/25/22 Range/Units





  04:46 04:46 


 


WBC  12.96 H   (4.50-10.00)  X 10*3/uL


 


RBC  2.61 L   (4.10-5.20)  X 10*6/uL


 


Hgb  7.5 L   (12.0-15.0)  g/dL


 


Hct  23.7 L   (37.2-46.3)  %


 


MCHC  31.6 L   (32.0-37.0)  g/dL


 


RDW  16.2 H   (11.5-14.5)  %


 


Immature Gran #  0.60 H   (0.00-0.04)  X 10*3/uL


 


Neutrophils #  8.67 H   (1.80-7.70)  X 10*3/uL


 


Monocytes #  1.16 H   (0.20-1.00)  X 10*3/uL


 


Anion Gap   9.50 L  (10.00-18.00)  mmol/L


 


Est GFR (CKD-EPI)AfAm   45.2 L  (60.0-200.0)   


 


Est GFR (CKD-EPI)NonAf   39.0 L  (60.0-200.0)   


 


Glucose   239 H  ()  mg/dL


 


Total Bilirubin   1.90 H  (0.30-1.20)  mg/dL


 


AST   65 H  (13-35)  U/L


 


ALT   147 H  (8-44)  U/L


 


Alkaline Phosphatase   544 H  ()  U/L


 


Total Protein   4.9 L  (6.2-8.2)  g/dL


 


Albumin   2.9 L  (3.8-4.9)  g/dL


 


Albumin/Globulin Ratio   1.45 L  (1.60-3.17)  g/dL








                      Microbiology - Last 24 Hours (Table)











 05/20/22 15:11 Blood Culture - Preliminary





 Blood    No Growth after 120 hours


 


 05/20/22 14:51 Blood Culture - Preliminary





 Blood    No Growth after 120 hours














Assessment and Plan


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


Current Visit: Yes   Status: Acute   Code(s): C25.9 - MALIGNANT NEOPLASM OF 

PANCREAS, UNSPECIFIED; C77.2 - SECONDARY AND UNSP MALIGNANT NEOPLASM OF INTRA-

ABD NODES   SNOMED Code(s): 950382267


   





(2) Normocytic anemia


Current Visit: Yes   Status: Acute   Code(s): D64.9 - ANEMIA, UNSPECIFIED   SNOM

ED Code(s): 187417480


   





(3) Leukocytosis


Current Visit: Yes   Status: Acute   Code(s): D72.829 - ELEVATED WHITE BLOOD 

CELL COUNT, UNSPECIFIED   SNOMED Code(s): 365877007


   





(4) Biliary obstruction


Current Visit: No   Status: Acute   Code(s): K83.1 - OBSTRUCTION OF BILE DUCT   

SNOMED Code(s): 704613792


   





(5) Jaundice


Current Visit: No   Status: Acute   Code(s): R17 - UNSPECIFIED JAUNDICE   SNOMED

Code(s): 78033922


   


Plan: 





Treatment of potential infectious etiology and IV hydration for dehydration


Await records (requested) for further recs on new diagnosis of pancreatic 

cancer, Dr. Fox will bethis evening





CT Scans with IV hydration for suboptimal renal function after are complete





No distant metastatic disease identified. Awaiting on tertiary hospital record, 

unknown if patient has been assessed for options of resection. 





Dementia at baseline, worsened with UTI.

## 2022-05-26 NOTE — P.PN
Subjective


Progress Note Date: 05/26/22


Principal diagnosis: 


UTI





Patient is a 81-year-old female with a known history of hypertension, 

hyperlipidemia, COPD, dementia and previous history of smoking and recent ERCP 

and stent placement and fine-needle aspiration biopsy of the pancreatic mass 

suspected cancer at [facility] was brought to hospital by her  

due to generalized weakness and tiredness.  Patient has not been eating or 

drinking since morning and has episode of vomiting.  She was also slightly more 

confused than normal.  And has been less expressive and responsive.  No fever no

chills.  No chest pain or shortness of breath.  No diarrhea.  No cough or sputum

production.  Patient has been evaluated at home.  Patient does complain of 

occasional abdominal pain as per her .





Patient was discharged from [facility] about a week ago where he had 

biliary stent placement and fine-needle aspiration biopsy was done. She was 

diagnosis with pancreatic cancer and was scheduled to see Dr. Fox in office on 

June 8th, however is now admitted. She was found to have possible UTI, treated. 

She is receiving IV Fluids and has become more awake per . The details of

her cancer are not known as the records from [facility] have not yet been sent.





5/24  The patient is resting in bed and appears comfortable. Her  states 

her mentation is clearing someone and she is back to her baseline which is A&O x

1.  He also states that her appetite is improving.  Awaiting records from [facility] for further recs from oncology on new diagnosis of pancreatic cancer. 

Patient's  would like to speak to Dr. Fox before trying to establish 

goals of care.  Will follow up with patient and her  tomorrow. 





5/25 The patient is sleeping in bed sleeping.  Her  states that her 

appetite is improving.  Awaiting records from [facility] for further recs from 

oncology on new diagnosis of pancreatic cancer. Patient's  would like to 

speak to Dr. Fox before trying to establish goals of care.  Will follow up with 

patient and her  tomorrow. 





Objective





- Vital Signs


Vital signs: 


                                   Vital Signs











Temp  98.3 F   05/26/22 07:43


 


Pulse  82   05/26/22 07:43


 


Resp  16   05/26/22 07:43


 


BP  114/69   05/26/22 07:43


 


Pulse Ox  98   05/26/22 07:43


 


FiO2      








                                 Intake & Output











 05/25/22 05/26/22 05/26/22





 18:59 06:59 18:59


 


Intake Total  875 


 


Output Total 950 400 


 


Balance -950 475 


 


Intake:   


 


  Intake, IV Titration  875 





  Amount   


 


    Sodium Chloride 0.9% 1,  875 





    000 ml @ 75 mls/hr IV .   





    [id number] On license of [facility] Rx#:[id number]   


 


Output:   


 


  Urine 950 400 


 


Other:   


 


  Voiding Method  External Catheter External Catheter














- Labs


CBC & Chem 7: 


                                 05/26/22 05:54





                                 05/26/22 05:54


Labs: 


                  Abnormal Lab Results - Last 24 Hours (Table)











  05/26/22 05/26/22 Range/Units





  05:54 05:54 


 


WBC  12.46 H   (4.50-10.00)  X 10*3/uL


 


RBC  2.33 L   (4.10-5.20)  X 10*6/uL


 


Hgb  6.8 L*   (12.0-15.0)  g/dL


 


Hct  21.5 L   (37.2-46.3)  %


 


MCHC  31.6 L   (32.0-37.0)  g/dL


 


RDW  16.4 H   (11.5-14.5)  %


 


Metamyelocytes %  2 H   (0-0)  %


 


Myelocytes %  4 H   (0-0)  %


 


Neutrophils # (Manual)  9.22 H   (2.00-8.90)  X 10*3/uL


 


Anion Gap   7.60 L  (10.00-18.00)  mmol/L


 


Est GFR (CKD-EPI)AfAm   55.3 L  (60.0-200.0)   


 


Est GFR (CKD-EPI)NonAf   47.7 L  (60.0-200.0)   


 


BUN/Creatinine Ratio   22.18 H  (12.00-20.00)  Ratio


 


Glucose   252 H  ()  mg/dL








                      Microbiology - Last 24 Hours (Table)











 05/20/22 15:11 Blood Culture - Preliminary





 Blood    No Growth after 120 hours


 


 05/20/22 14:51 Blood Culture - Preliminary





 Blood    No Growth after 120 hours














Assessment and Plan


Assessment: 


Symptoms


* Pain - 0/10


* Fatigue - yes


* weakness - yes


* SOB - no


* Insomnia - no


* N/V - yes


* Anxiety - no


* Depression - no


* Confusion - yes, continue namenda


* Agitation - no


* Hallucinations - no


* Appetite/weight loss - decreased appetite since biliary stent placement, 5-7lb

  weight loss in past 3 weeks, appetite improving today


* Dysphagia - no


* Constipation - no, continue colace prn, added mom 


* Incontinence - yes, wears briefs


* Itch - no





Plan: 


Summary/Goals - The patient is sleeping in bed and appears comfortable.  

Awaiting records from [facility] for further recs from oncology on new diagnosis

of pancreatic cancer. Patient's  would like to speak to Dr. Fox before 

trying to establish goals of care. Continue with antibiotic treatment for UTI. 

Hgb 6.9 will receive 1 unit RBC's. Will follow up with patient and her  

tomorrow. 





Advanced Directives - information given





Code Status - DNR





Thank you for this consult





Nicci Cowan [facility]


Palliative Care


[facility] [id number]


Email: [email or web address]








Time with Patient: Less than 30

## 2022-05-26 NOTE — P.PN
Subjective


Progress Note Date: 05/25/22





Review of non cintrasted CT abdomen and pelvis. 





Objective





- Vital Signs


Vital signs: 


                                   Vital Signs











Temp  98.6 F   05/25/22 14:26


 


Pulse  75   05/25/22 14:26


 


Resp  17   05/25/22 14:26


 


BP  133/72   05/25/22 14:26


 


Pulse Ox  99   05/25/22 14:26


 


FiO2      








                                 Intake & Output











 05/24/22 05/25/22 05/25/22





 18:59 06:59 18:59


 


Intake Total 1080 900 


 


Output Total  500 


 


Balance 1080 400 


 


Intake:   


 


  Intake, IV Titration  900 





  Amount   


 


    Sodium Chloride 0.9% 1,  900 





    000 ml @ 75 mls/hr IV .   





    [id number] [facility] Rx#:[id number]   


 


  Oral 1080  


 


Output:   


 


  Urine  500 


 


Other:   


 


  Voiding Method External Catheter External Catheter 














- Exam





Jasundice


Weak


Mildly confused


Inapprprpiate but awak


Abdomen no pain


HR Irr


Ext: mild edema





- Labs


CBC & Chem 7: 


                                 05/25/22 04:46





                                 05/25/22 04:46


Labs: 


                  Abnormal Lab Results - Last 24 Hours (Table)











  05/25/22 05/25/22 Range/Units





  04:46 04:46 


 


WBC  12.96 H   (4.50-10.00)  X 10*3/uL


 


RBC  2.61 L   (4.10-5.20)  X 10*6/uL


 


Hgb  7.5 L   (12.0-15.0)  g/dL


 


Hct  23.7 L   (37.2-46.3)  %


 


MCHC  31.6 L   (32.0-37.0)  g/dL


 


RDW  16.2 H   (11.5-14.5)  %


 


Immature Gran #  0.60 H   (0.00-0.04)  X 10*3/uL


 


Neutrophils #  8.67 H   (1.80-7.70)  X 10*3/uL


 


Monocytes #  1.16 H   (0.20-1.00)  X 10*3/uL


 


Anion Gap   9.50 L  (10.00-18.00)  mmol/L


 


Est GFR (CKD-EPI)AfAm   45.2 L  (60.0-200.0)   


 


Est GFR (CKD-EPI)NonAf   39.0 L  (60.0-200.0)   


 


Glucose   239 H  ()  mg/dL


 


Total Bilirubin   1.90 H  (0.30-1.20)  mg/dL


 


AST   65 H  (13-35)  U/L


 


ALT   147 H  (8-44)  U/L


 


Alkaline Phosphatase   544 H  ()  U/L


 


Total Protein   4.9 L  (6.2-8.2)  g/dL


 


Albumin   2.9 L  (3.8-4.9)  g/dL


 


Albumin/Globulin Ratio   1.45 L  (1.60-3.17)  g/dL








                      Microbiology - Last 24 Hours (Table)











 05/20/22 14:51 Blood Culture - Preliminary





 Blood    No Growth after 96 hours


 


 05/20/22 15:11 Blood Culture - Preliminary





 Blood    No Growth after 96 hours














Assessment and Plan


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


Current Visit: Yes   Status: Acute   Code(s): C25.9 - MALIGNANT NEOPLASM OF PANC

REAS, UNSPECIFIED; C77.2 - SECONDARY AND UNSP MALIGNANT NEOPLASM OF INTRA-ABD 

NODES   SNOMED Code(s): 112353305


   





(2) Normocytic anemia


Narrative/Plan: 


no evidence of iron deficiency, no supplementation to be given, likely related 

to intrabdominal mets and malignancy


Current Visit: Yes   Status: Acute   Code(s): D64.9 - ANEMIA, UNSPECIFIED   S

NOMED Code(s): 356023147


   





(3) Leukocytosis


Current Visit: Yes   Status: Acute   Code(s): D72.829 - ELEVATED WHITE BLOOD 

CELL COUNT, UNSPECIFIED   SNOMED Code(s): 430633546


   





(4) Biliary obstruction


Current Visit: No   Status: Acute   Code(s): K83.1 - OBSTRUCTION OF BILE DUCT   

SNOMED Code(s): 566810690


   





(5) Jaundice


Current Visit: No   Status: Acute   Code(s): R17 - UNSPECIFIED JAUNDICE   SNOMED

Code(s): 60617409


   


Plan: 





Treatment of potential infectious etiology and IV hydration for dehydration


Await records (requested) for further recs on new diagnosis of pancreatic 

cancer, Dr. Fox will bethis evening





CT Scans with IV hydration for suboptimal renal function after

## 2022-05-26 NOTE — CT
EXAMINATION TYPE: CT ChestAbdPelvis w con

 

DATE OF EXAM: 5/26/2022

 

COMPARISON: CT dated 5/5/2022

 

HISTORY: Initial Staging

 

CT DLP: 1039.5 mGycm

Automated exposure control for dose reduction was used.

 

CONTRAST: 

CT scan of the chest, abdomen and pelvis is performed with Oral Contrast and with IV Contrast, patien
t injected with 80 ml mL of Isovue 300.

 

FINDINGS:

 

LUNGS: 3 mm pleural-based nodule at the medial aspect of the left upper lobe (image #15, series 204).
 No other definite lung nodule identified. Patent trachea and main bronchi. No pleural effusion.

 

MEDIASTINUM: Scattered subcentimeter bilateral axillary lymph nodes. No pathologically enlarged lymph
 nodes in the chest. No gross cardiomegaly. Coronary and arterial atherosclerotic calcifications. No 
pericardial effusion.

 

OTHER:  Right breast calcification, likely benign. Diffuse osteopenia. No aggressive bone lesion. T7 
upper endplate depression with sclerotic changes, likely chronic.

 

LIVER/GB: Interval insertion of a CBD stent, apparently in adequate position with intrahepatic pneumo
bilia. No definite hepatic focal lesion. Density seen within the gallbladder, possibly representing t
hick sludge.

 

PANCREAS: Suboptimal assessment of the pancreas due to artifacts. Suspected ill-defined hypodensity c
entered over the pancreatic head measuring 21 x 27 mm, pancreatic cancer cannot be excluded. Markedly
 atrophic changes of the pancreatic body and tail with dilated pancreatic duct. Markedly attenuated c
aliber of the adjacent portion of the portal vein and splenic vein, possibly due to invasion. Fat is 
seen between the ill-defined area in the pancreatic head and the superior mesenteric artery however i
t is inseparable from the superior mesenteric vein. The ill-defined hypodense area is also inseparabl
e from the anterior aspect of abdominal aorta.

 

SPLEEN: No significant abnormality is seen.

 

ADRENALS: Slightly thickened left adrenal, nonspecific. Unremarkable right adrenal.

 

KIDNEYS: No significant abnormality is seen.

 

BOWEL:  Unremarkable stomach and duodenum. Suboptimal assessment of small and large bowel due to pauc
ity of intra-abdominal fat. No evidence of bowel obstruction. Significant fecal loading of the rectum
 and colon. Normal appendix.

 

REPRODUCTIVE ORGANS: No gross uterine or adnexal mass.

 

LYMPH NODES: Suspected millimetric peripancreatic lymph nodes. No pathologically enlarged lymph nodes
 in the abdomen or the pelvis otherwise.

 

OSSEOUS STRUCTURES: Severe degenerative changes at L3-4 and L4-5 levels. No aggressive bone lesion.

 

OTHER: Extensive arterial atherosclerotic calcification. Infrarenal abdominal aortic ectasia measurin
g 2.7 cm. No sizable ascites. Left anterior abdominal subcutaneous fat stranding and reactive fluid, 
acute inflammatory/infectious process at that location can't be excluded, please correlate clinically
.

 

IMPRESSION: 

Findings are suspicious for pancreatic head cancer as detailed above, suboptimally assessed by this C
T scan. No obvious metastatic disease seen in the chest, abdomen or pelvis. Other findings as detaile
d above.

## 2022-05-26 NOTE — P.CONS
History of Present Illness





- Reason for Consult


Consult date: 05/25/22


Sepsis


Requesting physician: Alesha Kovacs





- Chief Complaint


weakness x few days





- History of Present Illness





Patient is a 81-year  female presenting to the ER 5 days ago for 

evaluation of generalized weakness, apparently the patient did have history of 

pancreatic cancer and recently did have a stent placement at [facility]

the  was complaining to the ER physician and the patient not been eating 

or drinking and did have an episode of vomiting the day of presentation to the 

hospital, she was also noted to have some mental status changes and this patient

did have a dementia is at baseline, on arrival to the ER the patient was 

afebrile and no fever had recorded subsequently patient was noticed to have 

white count of 24,000 on admission that is trending down down to 12.96 did have 

elevated BUN/creatinine and elevated lactic acid level enzymes are elevated 

patient did have a blood cultures which are negative so far urine has been 

showing Klebsiella patient is currently being treated with Rocephin 2 g daily 

infectious disease was consulted today for concern for possible sepsis, patient 

denies having any fever or any chills, denies having any chest pain shortness of

breath or cough no further vomiting has been reported or any diarrhea





Review of Systems


Positive point has been  mentioned in the HPI rest of the systems are negative








Past Medical History


Past Medical History: COPD, Dementia, Hyperlipidemia, Hypertension, Memory 

Impairment, Renal Disease


Additional Past Medical History / Comment(s): HIATAL HERNIA


History of Any Multi-Drug Resistant Organisms: None Reported


Past Surgical History: Bowel Resection


Additional Past Surgical History / Comment(s): LT CAROTID ENDARTERECTOMY.  

COLONOSCOPY.  BILAT CATARACTS REMOVED.  D & C.  EGD


Past Anesthesia/Blood Transfusion Reactions: No Reported Reaction


Past Psychological History: No Psychological Hx Reported


Smoking Status: Former smoker


Past Alcohol Use History: Rare


Past Drug Use History: None Reported





- Past Family History


  ** Father


Family Medical History: Cancer





Medications and Allergies


                                Home Medications











 Medication  Instructions  Recorded  Confirmed  Type


 


Clopidogrel [Plavix] 75 mg PO DAILY 02/03/15 05/20/22 History


 


Aspirin EC [Ecotrin Low Dose] 81 mg PO DAILY 01/22/21 05/20/22 History


 


Memantine HCl 10 mg PO BID 01/22/21 05/20/22 History


 


Cholecalciferol [Vitamin D3 (25 25 mcg PO DAILY 05/05/22 05/20/22 History





Mcg = 1000 Iu)]    


 


Ferrous Sulfate [Feosol] 325 mg PO DAILY 05/05/22 05/20/22 History


 


Ginkgo Biloba 1200mg 1 tab PO DAILY 05/05/22 05/20/22 History


 


Simvastatin 40 mg PO HS 05/05/22 05/20/22 History


 


amLODIPine [Norvasc] 5 mg PO DAILY 05/05/22 05/20/22 History


 


buPROPion HCL [Wellbutrin XL] 150 mg PO DAILY 05/05/22 05/20/22 History


 


Escitalopram [Lexapro] 5 mg PO DAILY 05/20/22 05/20/22 History


 


Furosemide [Lasix] 40 mg PO [id number] 05/20/22 05/20/22 History


 


Magnesium Hydroxide [Milk of 2,400 mg PO HS 05/20/22 05/20/22 History





Magnesia]    








                                    Allergies











Allergy/AdvReac Type Severity Reaction Status Date / Time


 


No Known Allergies Allergy   Verified 05/20/22 12:57














Physical Exam


Vitals: 


                                   Vital Signs











  Temp Pulse Resp BP Pulse Ox


 


 05/25/22 14:26  98.6 F  75  17  133/72  99


 


 05/25/22 07:31  98.4 F  74  16  147/74  97


 


 05/25/22 02:00  97.4 F L  91  16  127/74  97


 


 05/24/22 18:43  98.1 F  65  20  156/72  99








                                Intake and Output











 05/25/22 05/25/22 05/25/22





 06:59 14:59 22:59


 


Intake Total 900  


 


Output Total 500  


 


Balance 400  


 


Intake:   


 


  Intake, IV Titration 900  





  Amount   


 


    Sodium Chloride 0.9% 1, 900  





    000 ml @ 75 mls/hr IV .   





    [id number] [facility][id number]   


 


Output:   


 


  Urine 500  











GENERAL DESCRIPTION: An elderly female lying in bed, no distress. No tachypnea 

or accessory muscle of respiration use.


HEENT: Shows Pallor , no scleral icterus. Oral mucous membrane is dry. No phar

yngeal erythema or thrush


NECK: Trachea central, no thyromegaly.


LUNGS: Unlabored breathing.  Decreased with some of these. No wheeze or crackle.


HEART: S1, S2, regular rate and rhythm. No loud murmur


ABDOMEN: Soft, no tenderness , guarding or rigidity, no organomegaly


EXTREMITIES: No edema of feet.


SKIN: No rash, no masses palpable.


NEUROLOGICAL: The patient is awake, alert, oriented x3, mood and affect normal.

















Results


CBC & Chem 7: 


                                 05/25/22 04:46





                                 05/25/22 04:46


Labs: 


                  Abnormal Lab Results - Last 24 Hours (Table)











  05/25/22 05/25/22 Range/Units





  04:46 04:46 


 


WBC  12.96 H   (4.50-10.00)  X 10*3/uL


 


RBC  2.61 L   (4.10-5.20)  X 10*6/uL


 


Hgb  7.5 L   (12.0-15.0)  g/dL


 


Hct  23.7 L   (37.2-46.3)  %


 


MCHC  31.6 L   (32.0-37.0)  g/dL


 


RDW  16.2 H   (11.5-14.5)  %


 


Immature Gran #  0.60 H   (0.00-0.04)  X 10*3/uL


 


Neutrophils #  8.67 H   (1.80-7.70)  X 10*3/uL


 


Monocytes #  1.16 H   (0.20-1.00)  X 10*3/uL


 


Anion Gap   9.50 L  (10.00-18.00)  mmol/L


 


Est GFR (CKD-EPI)AfAm   45.2 L  (60.0-200.0)   


 


Est GFR (CKD-EPI)NonAf   39.0 L  (60.0-200.0)   


 


Glucose   239 H  ()  mg/dL


 


Total Bilirubin   1.90 H  (0.30-1.20)  mg/dL


 


AST   65 H  (13-35)  U/L


 


ALT   147 H  (8-44)  U/L


 


Alkaline Phosphatase   544 H  ()  U/L


 


Total Protein   4.9 L  (6.2-8.2)  g/dL


 


Albumin   2.9 L  (3.8-4.9)  g/dL


 


Albumin/Globulin Ratio   1.45 L  (1.60-3.17)  g/dL








                      Microbiology - Last 24 Hours (Table)











 05/20/22 14:51 Blood Culture - Preliminary





 Blood    No Growth after 96 hours


 


 05/20/22 15:11 Blood Culture - Preliminary





 Blood    No Growth after 96 hours














Assessment and Plan


(1) Urinary tract infection


Current Visit: Yes   Status: Acute   Code(s): N39.0 - URINARY TRACT INFECTION, 

SITE NOT SPECIFIED   SNOMED Code(s): 92145452


   


Plan: 


1patient presented to hospital with weakness decreased appetite did have an 

episode of vomiting in this patient with underlying pancreatic cancer symptoms 

etiology is multifactorial likely component of dehydration as the patient has 

significantly weighted BUN/creatinine on admission plus minus a component of 

ureteric infection with urine culture showing Klebsiella pneumonia blood culture

has been negative.


2continue with Rocephin 2 g daily will be transition to oral Ceftin on 

discharge.


3gentle IV fluid.


We will follow on clinical condition and cultures to further adjust medication 

if needed


Thank you for this consultation will follow this patient along with you





Time with Patient: Greater than 30

## 2022-05-27 LAB
ALBUMIN SERPL-MCNC: 3.2 G/DL (ref 3.8–4.9)
ALBUMIN/GLOB SERPL: 1.52 G/DL (ref 1.6–3.17)
ALP SERPL-CCNC: 503 U/L (ref 41–126)
ALT SERPL-CCNC: 122 U/L (ref 8–44)
ANION GAP SERPL CALC-SCNC: 10.1 MMOL/L (ref 10–18)
AST SERPL-CCNC: 53 U/L (ref 13–35)
BASOPHILS # BLD MANUAL: 0 X 10*3/UL (ref 0–0.1)
BUN SERPL-SCNC: 22.6 MG/DL (ref 9–27)
BUN/CREAT SERPL: 20.55 RATIO (ref 12–20)
CALCIUM SPEC-MCNC: 9.3 MG/DL (ref 8.7–10.3)
CHLORIDE SERPL-SCNC: 103 MMOL/L (ref 96–109)
CO2 SERPL-SCNC: 24.9 MMOL/L (ref 20–27.5)
EOSINOPHIL # BLD MANUAL: 0.33 X 10*3/UL (ref 0.04–0.35)
ERYTHROCYTE [DISTWIDTH] IN BLOOD BY AUTOMATED COUNT: 3.36 X 10*6/UL (ref 4.1–5.2)
ERYTHROCYTE [DISTWIDTH] IN BLOOD: 15.4 % (ref 11.5–14.5)
GLOBULIN SER CALC-MCNC: 2.1 G/DL (ref 1.6–3.3)
GLUCOSE SERPL-MCNC: 211 MG/DL (ref 70–110)
HCT VFR BLD AUTO: 30.6 % (ref 37.2–46.3)
HGB BLD-MCNC: 9.8 G/DL (ref 12–15)
LYMPHOCYTES # BLD MANUAL: 1.15 X 10*3/UL (ref 0.9–5)
MAGNESIUM SPEC-SCNC: 2.1 MG/DL (ref 1.5–2.4)
MCH RBC QN AUTO: 29.2 PG (ref 27–32)
MCHC RBC AUTO-ENTMCNC: 32 G/DL (ref 32–37)
MCV RBC AUTO: 91.1 FL (ref 80–97)
MONOCYTES # BLD MANUAL: 0.66 X 10*3/UL (ref 0.2–1)
NEUTROPHILS NFR BLD MANUAL: 85 %
NEUTS SEG # BLD MANUAL: 14 X 10*3/UL (ref 2–8.9)
NRBC BLD AUTO-RTO: 0 /100 WBCS (ref 0–0)
PLATELET # BLD AUTO: 308 X 10*3/UL (ref 140–440)
POTASSIUM SERPL-SCNC: 4.3 MMOL/L (ref 3.5–5.5)
PROT SERPL-MCNC: 5.3 G/DL (ref 6.2–8.2)
SODIUM SERPL-SCNC: 138 MMOL/L (ref 135–145)
WBC # BLD AUTO: 16.47 X 10*3/UL (ref 4.5–10)

## 2022-05-27 RX ADMIN — CHOLECALCIFEROL TAB 125 MCG (5000 UNIT) SCH MCG: 125 TAB at 07:12

## 2022-05-27 RX ADMIN — HEPARIN SODIUM SCH UNIT: 5000 INJECTION INTRAVENOUS; SUBCUTANEOUS at 07:11

## 2022-05-27 RX ADMIN — ASPIRIN 81 MG CHEWABLE TABLET SCH MG: 81 TABLET CHEWABLE at 07:12

## 2022-05-27 RX ADMIN — CEFAZOLIN SCH MLS/HR: 330 INJECTION, POWDER, FOR SOLUTION INTRAMUSCULAR; INTRAVENOUS at 13:36

## 2022-05-27 RX ADMIN — MAGNESIUM HYDROXIDE SCH MG: 2400 SUSPENSION ORAL at 21:42

## 2022-05-27 RX ADMIN — MEMANTINE HYDROCHLORIDE SCH MG: 5 TABLET ORAL at 21:42

## 2022-05-27 RX ADMIN — HEPARIN SODIUM SCH UNIT: 5000 INJECTION INTRAVENOUS; SUBCUTANEOUS at 23:41

## 2022-05-27 RX ADMIN — HEPARIN SODIUM SCH UNIT: 5000 INJECTION INTRAVENOUS; SUBCUTANEOUS at 16:57

## 2022-05-27 RX ADMIN — PANTOPRAZOLE SODIUM SCH MG: 40 INJECTION, POWDER, FOR SOLUTION INTRAVENOUS at 08:25

## 2022-05-27 RX ADMIN — CLOPIDOGREL BISULFATE SCH MG: 75 TABLET ORAL at 07:12

## 2022-05-27 RX ADMIN — MEMANTINE HYDROCHLORIDE SCH MG: 5 TABLET ORAL at 07:12

## 2022-05-27 NOTE — P.PN
Subjective


Progress Note Date: 05/27/22


Principal diagnosis: 


UTI





Patient is a 81-year-old female with a known history of hypertension, 

hyperlipidemia, COPD, dementia and previous history of smoking and recent ERCP 

and stent placement and fine-needle aspiration biopsy of the pancreatic mass 

suspected cancer at [facility] was brought to hospital by her  

due to generalized weakness and tiredness.  Patient has not been eating or 

drinking since morning and has episode of vomiting.  She was also slightly more 

confused than normal.  And has been less expressive and responsive.  No fever no

chills.  No chest pain or shortness of breath.  No diarrhea.  No cough or sputum

production.  Patient has been evaluated at home.  Patient does complain of 

occasional abdominal pain as per her .





Patient was discharged from [facility] about a week ago where he had 

biliary stent placement and fine-needle aspiration biopsy was done. She was 

diagnosis with pancreatic cancer and was scheduled to see Dr. Fox in office on 

June 8th, however is now admitted. She was found to have possible UTI, treated. 

She is receiving IV Fluids and has become more awake per . The details of

her cancer are not known as the records from [facility] have not yet been sent.





5/24  The patient is resting in bed and appears comfortable. Her  states 

her mentation is clearing someone and she is back to her baseline which is A&O x

1.  He also states that her appetite is improving.  Awaiting records from [facility] for further recs from oncology on new diagnosis of pancreatic cancer. 

Patient's  would like to speak to Dr. Fox before trying to establish 

goals of care.  Will follow up with patient and her  tomorrow. 





5/25 The patient is sleeping in bed sleeping.  Her  states that her 

appetite is improving.  Awaiting records from [facility] for further recs from 

oncology on new diagnosis of pancreatic cancer. Patient's  would like to 

speak to Dr. Fox before trying to establish goals of care.  Will follow up with 

patient and her  tomorrow. 





5/26 The patient is sleeping in bed and appears comfortable.  Awaiting records 

from [facility] for further recs from oncology on new diagnosis of pancreatic 

cancer. Patient's  would like to speak to Dr. Fox before trying to 

establish goals of care. Continue with antibiotic treatment for UTI. Hgb 6.9 

will receive 1 unit RBC's. Will follow up with patient and her  tomorrow.










Objective





- Vital Signs


Vital signs: 


                                   Vital Signs











Temp  98.1 F   05/27/22 05:41


 


Pulse  96   05/27/22 05:41


 


Resp  16   05/27/22 05:41


 


BP  155/76   05/27/22 05:41


 


Pulse Ox  99   05/27/22 05:41


 


FiO2      








                                 Intake & Output











 05/26/22 05/27/22 05/27/22





 18:59 06:59 18:59


 


Intake Total 1500 1240 


 


Output Total 800 1050 


 


Balance 700 190 


 


Weight   56.699 kg


 


Intake:   


 


  Intake, IV Titration 1500 700 





  Amount   


 


    Sodium Chloride 0.9% 1, 1400 700 





    000 ml @ 75 mls/hr IV .   





    [id number] [facility] Rx#:[id number]   


 


    cefTRIAXone 2 gm In 100  





    Sodium Chloride 0.9% 50   





    ml @ 100 mls/hr IVPB   





    Q24HR [facility] Rx#:[id number]   


 


  Blood Product  540 


 


    Rc As-1  Unit  240 





    [id number]   


 


Output:   


 


  Urine 800 1050 


 


Other:   


 


  Voiding Method External Catheter External Catheter External Catheter














- Exam


General: Patient awake alert. Oriented x 1, to person.  No acute distress.


HEENT: Head is atraumatic, normocephalic Neck is supple. Sclerae are clear. 

Pupils equal, round and reactive to light bilaterally.  


CV: Heart regular in rate and rhythm positive S1 and S2.  No S3. No S4.  No 

clicks, rubs or murmurs. No JVD. Peripheral pulses equal. 2/4


Lungs: Clear to auscultation bilaterally.  No wheezes rales or rhonchi. 

Respirations even and nonlabored. No intercostal retractions.


Abdomen/GI: Soft. Bowel sounds present in all 4 quadrants. Bowel sounds 

normoactive. No abdominal tenderness.  


Musculoskeletal/ Extremities: No tenderness on muscular exam.  No ecchymosis.


Vascular: Radial pulses equal. 2/4.


Skin: No rash. + slightly jaundice


Neurologic: Awake, alert and oriented times 1. 


Psychiatric: Appropriate mood, flat affect.








- Labs


CBC & Chem 7: 


                                 05/27/22 06:11





                                 05/27/22 06:11


Labs: 


                  Abnormal Lab Results - Last 24 Hours (Table)











  05/26/22 05/26/22 05/26/22 Range/Units





  05:54 05:54 16:30 


 


WBC  12.46 H    (4.50-10.00)  X 10*3/uL


 


RBC  2.33 L    (4.10-5.20)  X 10*6/uL


 


Hgb  6.8 L*    (12.0-15.0)  g/dL


 


Hct  21.5 L    (37.2-46.3)  %


 


MCHC  31.6 L    (32.0-37.0)  g/dL


 


RDW  16.4 H    (11.5-14.5)  %


 


Metamyelocytes %  2 H    (0-0)  %


 


Myelocytes %  4 H    (0-0)  %


 


Neutrophils # (Manual)  9.22 H    (2.00-8.90)  X 10*3/uL


 


Est GFR (CKD-EPI)AfAm     (60.0-200.0)   


 


Est GFR (CKD-EPI)NonAf     (60.0-200.0)   


 


BUN/Creatinine Ratio     (12.00-20.00)  Ratio


 


Glucose     ()  mg/dL


 


Total Bilirubin   1.70 H   (0.30-1.20)  mg/dL


 


AST   47 H   (13-35)  U/L


 


ALT   117 H   (8-44)  U/L


 


Alkaline Phosphatase   451 H   ()  U/L


 


Total Protein   4.7 L   (6.2-8.2)  g/dL


 


Albumin   2.8 L   (3.8-4.9)  g/dL


 


Albumin/Globulin Ratio   1.52 L   (1.60-3.17)  g/dL


 


Crossmatch    See Detail  














  05/27/22 05/27/22 Range/Units





  06:11 06:11 


 


WBC  16.47 H   (4.50-10.00)  X 10*3/uL


 


RBC  3.36 L   (4.10-5.20)  X 10*6/uL


 


Hgb  9.8 L   (12.0-15.0)  g/dL


 


Hct  30.6 L   (37.2-46.3)  %


 


MCHC    (32.0-37.0)  g/dL


 


RDW  15.4 H   (11.5-14.5)  %


 


Metamyelocytes %    (0-0)  %


 


Myelocytes %  2 H   (0-0)  %


 


Neutrophils # (Manual)  14.00 H   (2.00-8.90)  X 10*3/uL


 


Est GFR (CKD-EPI)AfAm   55.3 L  (60.0-200.0)   


 


Est GFR (CKD-EPI)NonAf   47.7 L  (60.0-200.0)   


 


BUN/Creatinine Ratio   20.55 H  (12.00-20.00)  Ratio


 


Glucose   211 H  ()  mg/dL


 


Total Bilirubin   2.10 H  (0.30-1.20)  mg/dL


 


AST   53 H  (13-35)  U/L


 


ALT   122 H  (8-44)  U/L


 


Alkaline Phosphatase   503 H  ()  U/L


 


Total Protein   5.3 L  (6.2-8.2)  g/dL


 


Albumin   3.2 L  (3.8-4.9)  g/dL


 


Albumin/Globulin Ratio   1.52 L  (1.60-3.17)  g/dL


 


Crossmatch    








                      Microbiology - Last 24 Hours (Table)











 05/20/22 14:51 Blood Culture - Final





 Blood    No Growth after 144 hours


 


 05/20/22 15:11 Blood Culture - Final





 Blood    No Growth after 144 hours














Assessment and Plan


Assessment: 


Symptoms


* Pain - 0/10


* Fatigue - yes


* weakness - yes


* SOB - no


* Insomnia - no


* N/V - yes


* Anxiety - no


* Depression - no


* Confusion - yes, continue namenda


* Agitation - no


* Hallucinations - no


* Appetite/weight loss - decreased appetite since biliary stent placement, 5-7lb

  weight loss in past 3 weeks, appetite improving today


* Dysphagia - no


* Constipation - no, continue colace prn, mom, dulcolax supp x 1 ordered


* Incontinence - yes, wears briefs


* Itch - no





Plan: 


Summary/Goals - The patient is resting in bed, in pleasantly confused, and 

appears comfortable.  Abd CT shows significant fecal load to rectum and colon.  

Dulcolax suppository ordered.  Awaiting records from [facility] for further recs

from oncology on new diagnosis of pancreatic cancer. Patient's  would 

like to speak to Dr. Fox before trying to establish goals of care. Continue with

antibiotic treatment for UTI. Hgb 9.8 today.





Advanced Directives - information given





Code Status - DNR





Thank you for this consult





Nicci Cowan [facility]


Palliative Care


[facility] [id number]


Email: [email or web address]








Time with Patient: Less than 30

## 2022-05-27 NOTE — CDI
Documentation Clarification Form



Date: 05/27/2022 02:14:00 PM

From: Ashley Brown RN, CCDS

Phone: [phone]

MRN: [id number]

Admit Date: 05/20/2022 07:08:00 PM

Patient Name: No Means

Visit Number: [id number]

Discharge Date:  





ATTENTION: The Clinical Documentation Specialists (CDI) and [facility] Coding Staff 
appreciate your assistance in clarifying documentation. Please respond to the 
clarification below the line at the bottom and electronically sign. The CDI & 
[facility] Coding staff will review the response and follow-up if needed. Please note: 
Queries are made part of the Legal Health Record. If you have any questions, 
please contact the author of this message via ITS.



Dr. Alesha Kovacs



A right heal pressure is documented by Wound Care assessment starting on 
5/20/22. Based on the documentation is there and additional diagnosis that is 
clinically appropriate for this patient? Please clarify POA indicatory. 



History/Risk Factors: Hypertension, Hyperlipidemia, COPD, Dementia, Pancreatic 
cancer 



Clinical Indicators: 81-year-old female present to ED not eating, drinking and 
has episodes of vomiting.

Location: Right Heel

Wound description: Unstageable Skin temperature: Warm, Skin color: Dusky, 
Blackened 



Treatment: 

Monitor skin integrity per protocol, keep pressure off heels





Is there an additional diagnosis that is clinically appropriate for this 
patient?

[  ] Right heel Pressure Ulcer Stage1

[  ] Right heel Pressure Ulcer unstageable

[  ] Other condition, please specify ______

[  ] Unable to determine 











Clinical Definitions:

Stage 1 Pressure Ulcer: intact skin, non-blanching redness of local area

Stage 2 Pressure Ulcer: Partial thickness, loss of dermis, pink wound bed

Stage 3 Pressure Ulcer: Full thickness tissue loss

Stage 4 Pressure Ulcer: Full thickness tissue loss with exposed bone, tendon, or
muscle.    

Unstageable pressure ulcer: Full thickness tissue loss in which the base of the 
ulcer is covered by slough (yellow, tan, gray, green or brown) and/or eschar 
(tan, brown or black) in the wound bed.





(Template Last Revised: March 2021)

___________________________________________________________________________

Right heel Pressure Ulcer unstageable

MTDD

## 2022-05-27 NOTE — CDI
Documentation Clarification Form



Date: 05/27/2022 01:35:11 PM

From: Ashley Brown RN, CCDS

Phone: [phone]

MRN: [id number]

Admit Date: 05/20/2022 07:08:00 PM

Patient Name: No Means

Visit Number: [id number]

Discharge Date:  





ATTENTION: The Clinical Documentation Specialists (CDI) and [facility] Coding Staff 
appreciate your assistance in clarifying documentation. Please respond to the 
clarification below the line at the bottom and electronically sign. The CDI & 
[facility] Coding staff will review the response and follow-up if needed. Please note: 
Queries are made part of the Legal Health Record. If you have any questions, 
please contact the author of this message via ITS.



Dr. Alesha Kovacs



Your patient has the documented symptom of slightly more confused than normal  
Additional clarification regarding the etiology/cause of this symptom is 
requested.



History/Risk Factors: Hypertension, Hyperlipidemia, COPD, Dementia, Pancreatic 
cancer 



Clinical Indicators: 81-year-old female present to ED not eating, drinking and 
has episodes of vomiting. She was also slightly more confused than normal. And 
has been less expressive and responsive. She was diagnosed with UTI with 
cultures showing Klebsiella pneumonia.

5/20 Labs: WBC24.0, Neutrophils 22.9, K+ 5.5, BUN 34, CR 1.54, Lactic acid 2.3  
Magnesium 2.6, Alkaline Phosphatase 1264 , ; UA Large Leukocyte 
Esterase, urine wbc 69

5/20 CXR: no acute pulmonary disease



5/22 Oncology consult : worsening lethargy and decreased PO intake. She is 
receiving IV Fluids and has become more awake per  . 



Treatment: 

.9NS 1,000 ML Bolus x2 5/20 then 75 MLS/HR 5/21

Nameda 5 MG PO BID

Rocephin 1,000 MG IVP X2 5/21  Then  2GM IVPB Q 24 HRS 5/21



Please clarify the etiology of the symptom of Altered Mental Status:

[  ] Metabolic encephalopathy   due to Sepsis and UTI

[  ] Dementia (if know, specify Type and if with/without Behavioral Disturbance)
_______

[  ] Other condition (please specify)_________________

[  ] Unable to determine





(Template Last Revised: February 2021)

___________________________________________________________________________

Unable to determine

MTDD

## 2022-05-27 NOTE — P.PN
Subjective


Progress Note Date: 05/26/22





This is a 81-year-old female who was recently admitted with UTI and being 

closely monitored. ID and oncology following at this time and urine cultures 

growing klebsiella pneumonia. Patient hemoglobin is 6.8 today and will order one

unit of prbc. Patient is continued on IV ceftriaxone ad will continue. CT 

abdomen ordered per oncology. Recommend repeat am labs. Patient is afebrile and 

denies chest pain or shortness of breath. 








Review of systems:


Constitutional: No reports of fatigue, fever, or chills


Cardiovascular: No reports of chest pain or palpitations


Respiratory: No reports of shortness of breath or cough


GI:  no reports of nausea, no reports of of vomiting


: No reports of dysuria or retention


Neurovascular:  reports of generalized weakness





All medications have been reviewed





Active Medications





Amlodipine Besylate (Amlodipine 5 Mg Tab)  5 mg PO DAILY [facility]


   Last Admin: 05/26/22 09:49 Dose:  5 mg


   


Aspirin (Aspirin 81 Mg)  81 mg PO DAILY [facility]


   Last Admin: 05/26/22 09:50 Dose:  81 mg


   


Cholecalciferol (Cholecalciferol 125 Mcg (5000 Iu) Tablet)  125 mcg PO DAILY [facility]


   Last Admin: 05/26/22 09:49 Dose:  125 mcg


   


Clopidogrel Bisulfate (Clopidogrel 75 Mg Tab)  75 mg PO DAILY [facility]


   Last Admin: 05/26/22 09:50 Dose:  75 mg


   


Docusate Sodium (Docusate 100 Mg Cap)  100 mg PO DAILY PRN


   PRN Reason: Constipation


Heparin Sodium (Porcine) (Heparin Sodium,Porcine/Pf 5,000 Unit/0.5 Ml Syringe)  

5,000 unit SQ Q8HR [facility]


   Last Admin: 05/26/22 23:18 Dose:  5,000 unit


   


Ceftriaxone Sodium 2 gm/ (Sodium Chloride)  50 mls @ 100 mls/hr IVPB Q24HR [facility]; 

Protocol


   Last Admin: 05/26/22 09:50 Dose:  100 mls/hr


   


Sodium Chloride (Saline 0.9%)  1,000 mls @ 75 mls/hr IV .[id number] [facility]


   Last Admin: 05/26/22 23:33 Dose:  Not Given


   


Iopamidol (Iopamidol Contrast (Oral Use) Vial)  30 ml PO Q60M PRN


   PRN Reason: CT Scan


   Stop: 05/27/22 08:49


Magnesium Hydroxide (Magnesium Hydroxide 2,400 Mg/10 Ml Cup)  2,400 mg PO HS [facility]


   Last Admin: 05/26/22 21:01 Dose:  2,400 mg


   


Memantine (Memantine 5 Mg Tab)  5 mg PO BID [facility]


   Last Admin: 05/26/22 21:01 Dose:  5 mg


   


Pantoprazole Sodium (Pantoprazole 40 Mg/10 Ml Vial)  40 mg IVP DAILY [facility]


   Last Admin: 05/26/22 15:36 Dose:  Not Given


   














PHYSICAL EXAMINATION: 





GENERAL: The patient is alert and oriented x2-3, thin built 


HEENT: Pupils are round and equally reacting to light. EOMI. no scleral icterus.

No conjunctival pallor. Normocephalic, atraumatic. No pharyngeal erythema. No 

thyromegaly.  


CARDIOVASCULAR: S1 and S2  muffled 


PULMONARY: diminished breath sounds bilaterally with no wheezing or rhonchi 

noted. 


ABDOMEN: soft.  Nontender on exam.   non-distended, normoactive bowel sounds. No

palpable organomegaly. 


MUSCULOSKELETAL: No joint swelling or deformity.


EXTREMITIES: No cyanosis, clubbing, or pedal edema. 


NEUROLOGICAL: Gross neurological examination did not reveal any focal deficits. 

Diffuse weakness


SKIN: No rashes. 





Assessment:





Acute urinary tract infection with klebsiella pneumonia and sepsis, present on 

admission


Acute kidney injury


Hyperbilirubinemia


Recent ERCP and biliary stent placement at [facility] for pancreatic cancer


COPD


GI prophylaxis


DVT prophylaxis


no code





Plan:





Recommend to continue with current medications and management with ID and 

oncology following.  Patient is continued on IV rocephin and will discuss with 

ID about discharge planning. Patient hemoglobin is 6.8 today and awaiting to 

receive a unit of blood. Recommend repeat labs. Oncology following and planning 

on CT abdomen. Awaiting medical records from [facility]. Encouraged increased 

activity as tolerated. Encouraged oral intake. Due to multiple complex medical 

issues, prognosis is guarded. 








The impression and plan of care has been dictated by Rebecca Patterson nurse 

practitioner as directed.





MD Britney


I have performed a history and examination and MDM of this patient, discussed 

the same with the dictator, and  agree with the dictator's assessment and plan 

as written ,documented as a scribe. Based on total visit time,  I have performed

more than 50% of the visit. Any additional findings or plans will be noted. 





Objective





- Vital Signs


Vital signs: 


                                   Vital Signs











Temp  98.3 F   05/26/22 07:43


 


Pulse  82   05/26/22 07:43


 


Resp  16   05/26/22 07:43


 


BP  114/69   05/26/22 07:43


 


Pulse Ox  98   05/26/22 07:43


 


FiO2      








                                 Intake & Output











 05/25/22 05/26/22 05/26/22





 18:59 06:59 18:59


 


Intake Total  875 


 


Output Total 950 400 


 


Balance -950 475 


 


Intake:   


 


  Intake, IV Titration  875 





  Amount   


 


    Sodium Chloride 0.9% 1,  875 





    000 ml @ 75 mls/hr IV .   





    [id number] [facility] Rx#:[id number]   


 


Output:   


 


  Urine 950 400 


 


Other:   


 


  Voiding Method  External Catheter External Catheter














- Labs


CBC & Chem 7: 


                                 05/26/22 05:54





                                 05/26/22 05:54


Labs: 


                  Abnormal Lab Results - Last 24 Hours (Table)











  05/26/22 05/26/22 Range/Units





  05:54 05:54 


 


WBC  12.46 H   (4.50-10.00)  X 10*3/uL


 


RBC  2.33 L   (4.10-5.20)  X 10*6/uL


 


Hgb  6.8 L*   (12.0-15.0)  g/dL


 


Hct  21.5 L   (37.2-46.3)  %


 


MCHC  31.6 L   (32.0-37.0)  g/dL


 


RDW  16.4 H   (11.5-14.5)  %


 


Metamyelocytes %  2 H   (0-0)  %


 


Myelocytes %  4 H   (0-0)  %


 


Neutrophils # (Manual)  9.22 H   (2.00-8.90)  X 10*3/uL


 


Anion Gap   7.60 L  (10.00-18.00)  mmol/L


 


Est GFR (CKD-EPI)AfAm   55.3 L  (60.0-200.0)   


 


Est GFR (CKD-EPI)NonAf   47.7 L  (60.0-200.0)   


 


BUN/Creatinine Ratio   22.18 H  (12.00-20.00)  Ratio


 


Glucose   252 H  ()  mg/dL








                      Microbiology - Last 24 Hours (Table)











 05/20/22 15:11 Blood Culture - Preliminary





 Blood    No Growth after 120 hours


 


 05/20/22 14:51 Blood Culture - Preliminary





 Blood    No Growth after 120 hours

## 2022-05-27 NOTE — P.PN
Subjective


Progress Note Date: 05/27/22


Principal diagnosis: 





Pancreatic Cancer





Review of CT reveals locally advanced disease. The plan will be to see Dr. Mcgregor 

after discharge for potential surgical intervention versus options of samantha-

adjuvant treatment.  was not present during visit today, although will 

discuss further with him over weekend. 





Objective





- Vital Signs


Vital signs: 


                                   Vital Signs











Temp  98.1 F   05/27/22 05:41


 


Pulse  96   05/27/22 05:41


 


Resp  16   05/27/22 05:41


 


BP  155/76   05/27/22 05:41


 


Pulse Ox  99   05/27/22 05:41


 


FiO2      








                                 Intake & Output











 05/26/22 05/27/22 05/27/22





 18:59 06:59 18:59


 


Intake Total 1500 1240 


 


Output Total 800 1050 


 


Balance 700 190 


 


Weight   56.699 kg


 


Intake:   


 


  Intake, IV Titration 1500 700 





  Amount   


 


    Sodium Chloride 0.9% 1, 1400 700 





    000 ml @ 75 mls/hr IV .   





    [id number] [facility] Rx#:[id number]   


 


    cefTRIAXone 2 gm In 100  





    Sodium Chloride 0.9% 50   





    ml @ 100 mls/hr IVPB   





    Q24HR [facility] Rx#:[id number]   


 


  Blood Product  540 


 


    Rc As-1  Unit  240 





    [id number]   


 


Output:   


 


  Urine 800 1050 


 


Other:   


 


  Voiding Method External Catheter External Catheter External Catheter














- Exam





Jasundice


Weak


Mildly confused


Inapprprpiate but awak


Abdomen no pain


HR Irr


Ext: mild edema





- Labs


CBC & Chem 7: 


                                 05/27/22 06:11





                                 05/27/22 06:11


Labs: 


                  Abnormal Lab Results - Last 24 Hours (Table)











  05/26/22 05/26/22 05/26/22 Range/Units





  05:54 05:54 16:30 


 


WBC  12.46 H    (4.50-10.00)  X 10*3/uL


 


RBC  2.33 L    (4.10-5.20)  X 10*6/uL


 


Hgb  6.8 L*    (12.0-15.0)  g/dL


 


Hct  21.5 L    (37.2-46.3)  %


 


MCHC  31.6 L    (32.0-37.0)  g/dL


 


RDW  16.4 H    (11.5-14.5)  %


 


Metamyelocytes %  2 H    (0-0)  %


 


Myelocytes %  4 H    (0-0)  %


 


Neutrophils # (Manual)  9.22 H    (2.00-8.90)  X 10*3/uL


 


Est GFR (CKD-EPI)AfAm     (60.0-200.0)   


 


Est GFR (CKD-EPI)NonAf     (60.0-200.0)   


 


BUN/Creatinine Ratio     (12.00-20.00)  Ratio


 


Glucose     ()  mg/dL


 


Total Bilirubin   1.70 H   (0.30-1.20)  mg/dL


 


AST   47 H   (13-35)  U/L


 


ALT   117 H   (8-44)  U/L


 


Alkaline Phosphatase   451 H   ()  U/L


 


Total Protein   4.7 L   (6.2-8.2)  g/dL


 


Albumin   2.8 L   (3.8-4.9)  g/dL


 


Albumin/Globulin Ratio   1.52 L   (1.60-3.17)  g/dL


 


Crossmatch    See Detail  














  05/27/22 05/27/22 Range/Units





  06:11 06:11 


 


WBC  16.47 H   (4.50-10.00)  X 10*3/uL


 


RBC  3.36 L   (4.10-5.20)  X 10*6/uL


 


Hgb  9.8 L   (12.0-15.0)  g/dL


 


Hct  30.6 L   (37.2-46.3)  %


 


MCHC    (32.0-37.0)  g/dL


 


RDW  15.4 H   (11.5-14.5)  %


 


Metamyelocytes %    (0-0)  %


 


Myelocytes %  2 H   (0-0)  %


 


Neutrophils # (Manual)  14.00 H   (2.00-8.90)  X 10*3/uL


 


Est GFR (CKD-EPI)AfAm   55.3 L  (60.0-200.0)   


 


Est GFR (CKD-EPI)NonAf   47.7 L  (60.0-200.0)   


 


BUN/Creatinine Ratio   20.55 H  (12.00-20.00)  Ratio


 


Glucose   211 H  ()  mg/dL


 


Total Bilirubin   2.10 H  (0.30-1.20)  mg/dL


 


AST   53 H  (13-35)  U/L


 


ALT   122 H  (8-44)  U/L


 


Alkaline Phosphatase   503 H  ()  U/L


 


Total Protein   5.3 L  (6.2-8.2)  g/dL


 


Albumin   3.2 L  (3.8-4.9)  g/dL


 


Albumin/Globulin Ratio   1.52 L  (1.60-3.17)  g/dL


 


Crossmatch    








                      Microbiology - Last 24 Hours (Table)











 05/20/22 14:51 Blood Culture - Final





 Blood    No Growth after 144 hours


 


 05/20/22 15:11 Blood Culture - Final





 Blood    No Growth after 144 hours














Assessment and Plan


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


Narrative/Plan: 


Review of CT reveals locally advanced disease. The plan will be to see Dr. Mcgregor 

after discharge for potential surgical intervention versus options of samantha-

adjuvant treatment.  was not present during visit today, although will 

discuss further with him over weekend. 


Current Visit: Yes   Status: Acute   Code(s): C25.9 - MALIGNANT NEOPLASM OF 

PANCREAS, UNSPECIFIED; C77.2 - SECONDARY AND UNSP MALIGNANT NEOPLASM OF INTRA-

ABD NODES   SNOMED Code(s): 770970895


   





(2) Normocytic anemia


Narrative/Plan: 


no evidence of iron deficiency, no supplementation to be given, likely related 

to intrabdominal mets and malignancy





Status post PRBC on 5/26, improved 9.8 today


Current Visit: Yes   Status: Acute   Code(s): D64.9 - ANEMIA, UNSPECIFIED   

SNOMED Code(s): 195071977


   





(3) Leukocytosis


Current Visit: Yes   Status: Acute   Code(s): D72.829 - ELEVATED WHITE BLOOD 

CELL COUNT, UNSPECIFIED   SNOMED Code(s): 631651179


   





(4) Biliary obstruction


Current Visit: No   Status: Acute   Code(s): K83.1 - OBSTRUCTION OF BILE DUCT   

SNOMED Code(s): 946497368


   





(5) Jaundice


Current Visit: No   Status: Acute   Code(s): R17 - UNSPECIFIED JAUNDICE   SNOMED

Code(s): 08991041


   


Plan: 





Treatment of potential infectious etiology and IV hydration for dehydration


Await records (requested) for further recs on new diagnosis of pancreatic 

cancer, Dr. Fox will bethis evening





CT Scans with IV hydration for suboptimal renal function after are complete and 

no evidence of metastatic disease noted





No distant metastatic disease identified. Awaiting on tertiary hospital record, 

unknown if patient has been assessed for options of resection. 





Dementia at baseline, worsened with UTI. 





LFTs appear to be stable.stagnet.








Review of CT reveals locally advanced disease. The plan will be to see Dr. Mcgregor 

after discharge for potential surgical intervention versus options of samantha-

adjuvant treatment.  was not present during visit today, although will 

discuss further with him over weekend.

## 2022-05-28 LAB
ALBUMIN SERPL-MCNC: 2.6 G/DL (ref 3.5–5)
ALBUMIN/GLOB SERPL: 1.1 {RATIO}
ALP SERPL-CCNC: 387 U/L (ref 38–126)
ALT SERPL-CCNC: 83 U/L (ref 4–34)
ANION GAP SERPL CALC-SCNC: 6 MMOL/L
AST SERPL-CCNC: 42 U/L (ref 14–36)
BASOPHILS # BLD AUTO: 0.1 K/UL (ref 0–0.2)
BASOPHILS NFR BLD AUTO: 1 %
BUN SERPL-SCNC: 30 MG/DL (ref 7–17)
CALCIUM SPEC-MCNC: 8.9 MG/DL (ref 8.4–10.2)
CHLORIDE SERPL-SCNC: 108 MMOL/L (ref 98–107)
CO2 SERPL-SCNC: 25 MMOL/L (ref 22–30)
EOSINOPHIL # BLD AUTO: 0.3 K/UL (ref 0–0.7)
EOSINOPHIL NFR BLD AUTO: 2 %
ERYTHROCYTE [DISTWIDTH] IN BLOOD BY AUTOMATED COUNT: 2.9 M/UL (ref 3.8–5.4)
ERYTHROCYTE [DISTWIDTH] IN BLOOD: 16.3 % (ref 11.5–15.5)
GLOBULIN SER CALC-MCNC: 2.4 G/DL
GLUCOSE SERPL-MCNC: 257 MG/DL (ref 74–99)
HCT VFR BLD AUTO: 27.2 % (ref 34–46)
HGB BLD-MCNC: 8.7 GM/DL (ref 11.4–16)
LYMPHOCYTES # SPEC AUTO: 1.8 K/UL (ref 1–4.8)
LYMPHOCYTES NFR SPEC AUTO: 15 %
MCH RBC QN AUTO: 29.9 PG (ref 25–35)
MCHC RBC AUTO-ENTMCNC: 31.9 G/DL (ref 31–37)
MCV RBC AUTO: 93.8 FL (ref 80–100)
MONOCYTES # BLD AUTO: 0.6 K/UL (ref 0–1)
MONOCYTES NFR BLD AUTO: 5 %
NEUTROPHILS # BLD AUTO: 9 K/UL (ref 1.3–7.7)
NEUTROPHILS NFR BLD AUTO: 75 %
PLATELET # BLD AUTO: 277 K/UL (ref 150–450)
POTASSIUM SERPL-SCNC: 4.2 MMOL/L (ref 3.5–5.1)
PROT SERPL-MCNC: 5 G/DL (ref 6.3–8.2)
SODIUM SERPL-SCNC: 139 MMOL/L (ref 137–145)
WBC # BLD AUTO: 12 K/UL (ref 3.8–10.6)

## 2022-05-28 RX ADMIN — CLOPIDOGREL BISULFATE SCH MG: 75 TABLET ORAL at 07:56

## 2022-05-28 RX ADMIN — HEPARIN SODIUM SCH UNIT: 5000 INJECTION INTRAVENOUS; SUBCUTANEOUS at 07:56

## 2022-05-28 RX ADMIN — ASPIRIN 81 MG CHEWABLE TABLET SCH MG: 81 TABLET CHEWABLE at 07:56

## 2022-05-28 RX ADMIN — MAGNESIUM HYDROXIDE SCH MG: 2400 SUSPENSION ORAL at 21:27

## 2022-05-28 RX ADMIN — HEPARIN SODIUM SCH UNIT: 5000 INJECTION INTRAVENOUS; SUBCUTANEOUS at 23:46

## 2022-05-28 RX ADMIN — MEMANTINE HYDROCHLORIDE SCH MG: 5 TABLET ORAL at 07:56

## 2022-05-28 RX ADMIN — MEMANTINE HYDROCHLORIDE SCH MG: 5 TABLET ORAL at 21:27

## 2022-05-28 RX ADMIN — CEFAZOLIN SCH MLS/HR: 330 INJECTION, POWDER, FOR SOLUTION INTRAMUSCULAR; INTRAVENOUS at 15:38

## 2022-05-28 RX ADMIN — CEFAZOLIN SCH: 330 INJECTION, POWDER, FOR SOLUTION INTRAMUSCULAR; INTRAVENOUS at 03:41

## 2022-05-28 RX ADMIN — HEPARIN SODIUM SCH UNIT: 5000 INJECTION INTRAVENOUS; SUBCUTANEOUS at 15:31

## 2022-05-28 RX ADMIN — PANTOPRAZOLE SODIUM SCH MG: 40 INJECTION, POWDER, FOR SOLUTION INTRAVENOUS at 09:16

## 2022-05-28 RX ADMIN — CHOLECALCIFEROL TAB 125 MCG (5000 UNIT) SCH MCG: 125 TAB at 07:57

## 2022-05-28 NOTE — P.PN
Subjective


Progress Note Date: 05/28/22





This is a 81-year-old female who was recently admitted with UTI and being 

closely monitored. ID and oncology following at this time and urine cultures 

growing klebsiella pneumonia. Patient hemoglobin is 6.8 today and will order one

unit of prbc. Patient is continued on IV ceftriaxone ad will continue. CT 

abdomen ordered per oncology. Recommend repeat am labs. Patient is afebrile and 

denies chest pain or shortness of breath. 





5/27/2022





Patient is seen today and family at the bedside. Patient WBC increased at 16 

today and is maintained on IV ceftriaxone with ESBL in the urine. hemoglobin 

improved and is stable at this time. ONcology following as well and work up in 

progress. ID following as well. Recommend repeat labs. Encouraged increased 

activity as tolerated. Patient is afebrile and denies chest pain or shortness of

breath. 





5/28/2022





Patient evaluated today and maintained on IV ceftriaxone and ID following for 

esbl klebsiella in the urine and has had 7 day course of abx and recommend m

onitoring closely off abx. Hemoglobin stable today and vss. Patient is afebrile 

and denies chest pain or shortness of breath. Patient with weakness and will 

have PT evaluate the patient. Recommend replacing electrolytes per protocol. 

Encouraged oral intake. Oncology following. 








Review of systems:


Constitutional: No reports of fatigue, fever, or chills


Cardiovascular: No reports of chest pain or palpitations


Respiratory: No reports of shortness of breath or cough


GI:  no reports of nausea, no reports of of vomiting


: No reports of dysuria or retention


Neurovascular:  reports of generalized weakness





All medications have been reviewed


Active Medications





Amlodipine Besylate (Amlodipine 5 Mg Tab)  5 mg PO DAILY [facility]


   Last Admin: 05/28/22 07:57 Dose:  5 mg


   


Aspirin (Aspirin 81 Mg)  81 mg PO DAILY [facility]


   Last Admin: 05/28/22 07:56 Dose:  81 mg


   


Cholecalciferol (Cholecalciferol 125 Mcg (5000 Iu) Tablet)  125 mcg PO DAILY [facility]


   Last Admin: 05/28/22 07:57 Dose:  125 mcg


   


Clopidogrel Bisulfate (Clopidogrel 75 Mg Tab)  75 mg PO DAILY [facility]


   Last Admin: 05/28/22 07:56 Dose:  75 mg


   


Docusate Sodium (Docusate 100 Mg Cap)  100 mg PO DAILY [facility]


   PRN Reason: Constipation


Heparin Sodium (Porcine) (Heparin Sodium,Porcine/Pf 5,000 Unit/0.5 Ml Syringe)  

5,000 unit SQ Q8HR [facility]


   Last Admin: 05/28/22 15:31 Dose:  5,000 unit


   


Sodium Chloride (Saline 0.9%)  1,000 mls @ 75 mls/hr IV .[id number] [facility]


   Last Admin: 05/28/22 15:38 Dose:  75 mls/hr


   


Magnesium Hydroxide (Magnesium Hydroxide 2,400 Mg/10 Ml Cup)  2,400 mg PO HS [facility]


   Last Admin: 05/28/22 21:27 Dose:  2,400 mg


   


Memantine (Memantine 5 Mg Tab)  5 mg PO BID [facility]


   Last Admin: 05/28/22 21:27 Dose:  5 mg


   


Pantoprazole Sodium (Pantoprazole 40 Mg/10 Ml Vial)  40 mg IVP DAILY [facility]


   Last Admin: 05/28/22 09:16 Dose:  40 mg


   





PHYSICAL EXAMINATION: 





GENERAL: The patient is alert and oriented x2-3, thin built 


HEENT: Pupils are round and equally reacting to light. EOMI. no scleral icterus.

No conjunctival pallor. Normocephalic, atraumatic. No pharyngeal erythema. No 

thyromegaly.  


CARDIOVASCULAR: S1 and S2  muffled 


PULMONARY: diminished breath sounds bilaterally with no wheezing or rhonchi 

noted. 


ABDOMEN: soft.  Nontender on exam.   non-distended, normoactive bowel sounds. No

palpable organomegaly. 


MUSCULOSKELETAL: No joint swelling or deformity.


EXTREMITIES: No cyanosis, clubbing, or pedal edema. 


NEUROLOGICAL: Gross neurological examination did not reveal any focal deficits. 

Diffuse weakness


SKIN: No rashes. 





Assessment:





Acute urinary tract infection with klebsiella pneumonia and sepsis, present on 

admission, ESBL in the urine


right heel, unstageable ulcer, present on admission


altered mental status possible metabolic encephalopathy secondary to acute UTI, 

present on admission


Acute kidney injury


Hyperbilirubinemia


Recent ERCP and biliary stent placement at [facility] for pancreatic cancer


COPD


GI prophylaxis


DVT prophylaxis


no code





Plan:





Recommend to continue with current medications and management with ID and 

oncology following.  Patient is continued on IV rocephin and has had 7 days of I

V abx and per ID will discontinue and monitor closely. Patient with esbl in the 

urine.  Patient hemoglobin is stable 8.7 today and no active bleeding noted.  

Recommend repeat labs. Oncology following. Awaiting medical records from [facility]. Encouraged increased activity as tolerated. Encouraged oral intake. Due to

multiple complex medical issues, prognosis is guarded. PT/OT to evaluate. 








The impression and plan of care has been dictated by Rebecca Patterosn, nurse 

practitioner as directed.





Dr. Gala MD


I have performed a history and examination and MDM of this patient, discussed 

the same with the dictator, and  agree with the dictator's assessment and plan 

as written ,documented as a scribe. Based on total visit time,  I have performed

more than 50% of the visit. Any additional findings or plans will be noted. 





Objective





- Vital Signs


Vital signs: 


                                   Vital Signs











Temp  98.5 F   05/28/22 07:22


 


Pulse  84   05/28/22 07:22


 


Resp  18   05/28/22 07:22


 


BP  116/64   05/28/22 07:22


 


Pulse Ox  98   05/28/22 07:22


 


FiO2      








                                 Intake & Output











 05/27/22 05/28/22 05/28/22





 18:59 06:59 18:59


 


Intake Total 296  


 


Output Total  300 


 


Balance 296 -300 


 


Weight 56.699 kg  


 


Intake:   


 


  Oral 296  


 


Output:   


 


  Urine  300 


 


Other:   


 


  Voiding Method External Catheter External Catheter 


 


  # Voids 2  


 


  # Bowel Movements 1  














- Labs


CBC & Chem 7: 


                                 05/28/22 05:49





                                 05/28/22 05:49


Labs: 


                  Abnormal Lab Results - Last 24 Hours (Table)











  05/28/22 05/28/22 Range/Units





  05:49 05:49 


 


WBC  12.0 H   (3.8-10.6)  k/uL


 


RBC  2.90 L   (3.80-5.40)  m/uL


 


Hgb  8.7 L D   (11.4-16.0)  gm/dL


 


Hct  27.2 L   (34.0-46.0)  %


 


RDW  16.3 H   (11.5-15.5)  %


 


Neutrophils #  9.0 H   (1.3-7.7)  k/uL


 


Chloride   108 H  ()  mmol/L


 


BUN   30 H  (7-17)  mg/dL


 


Creatinine   1.09 H  (0.52-1.04)  mg/dL


 


Glucose   257 H  (74-99)  mg/dL


 


Total Bilirubin   1.7 H  (0.2-1.3)  mg/dL


 


AST   42 H  (14-36)  U/L


 


ALT   83 H  (4-34)  U/L


 


Alkaline Phosphatase   387 H  ()  U/L


 


Total Protein   5.0 L  (6.3-8.2)  g/dL


 


Albumin   2.6 L  (3.5-5.0)  g/dL

## 2022-05-28 NOTE — P.PN
Subjective


Progress Note Date: 05/27/22





This is a 81-year-old female who was recently admitted with UTI and being 

closely monitored. ID and oncology following at this time and urine cultures 

growing klebsiella pneumonia. Patient hemoglobin is 6.8 today and will order one

unit of prbc. Patient is continued on IV ceftriaxone ad will continue. CT 

abdomen ordered per oncology. Recommend repeat am labs. Patient is afebrile and 

denies chest pain or shortness of breath. 





5/27/2022





Patient is seen today and family at the bedside. Patient WBC increased at 16 

today and is maintained on IV ceftriaxone with ESBL in the urine. hemoglobin 

improved and is stable at this time. ONcology following as well and work up in 

progress. ID following as well. Recommend repeat labs. Encouraged increased 

activity as tolerated. Patient is afebrile and denies chest pain or shortness of

breath. 








Review of systems:


Constitutional: No reports of fatigue, fever, or chills


Cardiovascular: No reports of chest pain or palpitations


Respiratory: No reports of shortness of breath or cough


GI:  no reports of nausea, no reports of of vomiting


: No reports of dysuria or retention


Neurovascular:  reports of generalized weakness





All medications have been reviewed





Active Medications





Amlodipine Besylate (Amlodipine 5 Mg Tab)  5 mg PO DAILY [facility]


   Last Admin: 05/27/22 07:12 Dose:  5 mg


   


Aspirin (Aspirin 81 Mg)  81 mg PO DAILY [facility]


   Last Admin: 05/27/22 07:12 Dose:  81 mg


   


Cholecalciferol (Cholecalciferol 125 Mcg (5000 Iu) Tablet)  125 mcg PO DAILY [facility]


   Last Admin: 05/27/22 07:12 Dose:  125 mcg


   


Clopidogrel Bisulfate (Clopidogrel 75 Mg Tab)  75 mg PO DAILY [facility]


   Last Admin: 05/27/22 07:12 Dose:  75 mg


   


Docusate Sodium (Docusate 100 Mg Cap)  100 mg PO DAILY PRN


   PRN Reason: Constipation


Heparin Sodium (Porcine) (Heparin Sodium,Porcine/Pf 5,000 Unit/0.5 Ml Syringe)  

5,000 unit SQ Q8HR [facility]


   Last Admin: 05/27/22 23:41 Dose:  5,000 unit


   


Ceftriaxone Sodium 2 gm/ (Sodium Chloride)  50 mls @ 100 mls/hr IVPB Q24HR [facility]; 

Protocol


   Last Admin: 05/27/22 07:12 Dose:  100 mls/hr


   


Sodium Chloride (Saline 0.9%)  1,000 mls @ 75 mls/hr IV .[id number] [facility]


   Last Admin: 05/28/22 03:41 Dose:  Not Given


   


Magnesium Hydroxide (Magnesium Hydroxide 2,400 Mg/10 Ml Cup)  2,400 mg PO HS [facility]


   Last Admin: 05/27/22 21:42 Dose:  2,400 mg


   


Memantine (Memantine 5 Mg Tab)  5 mg PO BID [facility]


   Last Admin: 05/27/22 21:42 Dose:  5 mg


   


Pantoprazole Sodium (Pantoprazole 40 Mg/10 Ml Vial)  40 mg IVP DAILY [facility]


   Last Admin: 05/27/22 08:25 Dose:  40 mg


   














PHYSICAL EXAMINATION: 





GENERAL: The patient is alert and oriented x2-3, thin built 


HEENT: Pupils are round and equally reacting to light. EOMI. no scleral icterus.

No conjunctival pallor. Normocephalic, atraumatic. No pharyngeal erythema. No 

thyromegaly.  


CARDIOVASCULAR: S1 and S2  muffled 


PULMONARY: diminished breath sounds bilaterally with no wheezing or rhonchi 

noted. 


ABDOMEN: soft.  Nontender on exam.   non-distended, normoactive bowel sounds. No

palpable organomegaly. 


MUSCULOSKELETAL: No joint swelling or deformity.


EXTREMITIES: No cyanosis, clubbing, or pedal edema. 


NEUROLOGICAL: Gross neurological examination did not reveal any focal deficits. 

Diffuse weakness


SKIN: No rashes. 





Assessment:





Acute urinary tract infection with klebsiella pneumonia and sepsis, present on 

admission, ESBL in the urine


right heel, unstageable ulcer, present on admission


altered mental status possible metabolic encephalopathy secondary to acute UTI, 

present on admission


Acute kidney injury


Hyperbilirubinemia


Recent ERCP and biliary stent placement at [facility] for pancreatic cancer


COPD


GI prophylaxis


DVT prophylaxis


no code





Plan:





Recommend to continue with current medications and management with ID and 

oncology following.  Patient is continued on IV rocephin and will continue. 

Patient with esbl in the urine. Need to discuss with ID about discharge 

planning. Patient hemoglobin is stable above 9 today and received a unit of 

blood yesterday.  Recommend repeat labs. Oncology following. [facility] 

[facility] Encouraged increased activity as tolerated. Encouraged 

oral intake. Due to multiple complex medical issues, prognosis is guarded. 








The impression and plan of care has been dictated by Rebecca Patterson, nurse 

practitioner as directed.





Dr. Gala MD


I have performed a history and examination and MDM of this patient, discussed 

the same with the dictator, and  agree with the dictator's assessment and plan 

as written ,documented as a scribe. Based on total visit time,  I have performed

more than 50% of the visit. Any additional findings or plans will be noted. 





Objective





- Vital Signs


Vital signs: 


                                   Vital Signs











Temp  98.3 F   05/28/22 01:49


 


Pulse  89   05/28/22 01:49


 


Resp  18   05/27/22 19:21


 


BP  104/58   05/28/22 01:49


 


Pulse Ox  96   05/28/22 01:49


 


FiO2      








                                 Intake & Output











 05/27/22 05/27/22 05/28/22





 06:59 18:59 06:59


 


Intake Total 1240 296 


 


Output Total 1050  


 


Balance 190 296 


 


Weight  56.699 kg 


 


Intake:   


 


  Intake, IV Titration 700  





  Amount   


 


    Sodium Chloride 0.9% 1, 700  





    000 ml @ 75 mls/hr IV .   





    [id number] [facility] Rx#:[id number]   


 


  Oral  296 


 


  Blood Product 540  


 


    Rc As-1  Unit 240  





    [id number]   


 


Output:   


 


  Urine 1050  


 


Other:   


 


  Voiding Method External Catheter External Catheter External Catheter


 


  # Voids  2 


 


  # Bowel Movements  1 














- Labs


CBC & Chem 7: 


                                 05/27/22 06:11





                                 05/27/22 06:11


Labs: 


                  Abnormal Lab Results - Last 24 Hours (Table)











  05/27/22 05/27/22 Range/Units





  06:11 06:11 


 


WBC  16.47 H   (4.50-10.00)  X 10*3/uL


 


RBC  3.36 L   (4.10-5.20)  X 10*6/uL


 


Hgb  9.8 L   (12.0-15.0)  g/dL


 


Hct  30.6 L   (37.2-46.3)  %


 


RDW  15.4 H   (11.5-14.5)  %


 


Myelocytes %  2 H   (0-0)  %


 


Neutrophils # (Manual)  14.00 H   (2.00-8.90)  X 10*3/uL


 


Est GFR (CKD-EPI)AfAm   55.3 L  (60.0-200.0)   


 


Est GFR (CKD-EPI)NonAf   47.7 L  (60.0-200.0)   


 


BUN/Creatinine Ratio   20.55 H  (12.00-20.00)  Ratio


 


Glucose   211 H  ()  mg/dL


 


Total Bilirubin   2.10 H  (0.30-1.20)  mg/dL


 


AST   53 H  (13-35)  U/L


 


ALT   122 H  (8-44)  U/L


 


Alkaline Phosphatase   503 H  ()  U/L


 


Total Protein   5.3 L  (6.2-8.2)  g/dL


 


Albumin   3.2 L  (3.8-4.9)  g/dL


 


Albumin/Globulin Ratio   1.52 L  (1.60-3.17)  g/dL

## 2022-05-28 NOTE — P.PN
Subjective


Progress Note Date: 05/27/22


Principal diagnosis: 


Klebsiella urinary tract infection





Patient is a 81-year-old  female with a past medical history 

significant for pancreatic cancer with recent stent placement at [facility], presented to hospital generalized weakness did have elevated white c

ount positive and concern for a symptomatic UTI.  Had been finalized Klebsiella


 on today's evaluation that is 05/27/2022, the patient remains to be afebrile, 

the patient is breathing comfortably on room air no chest pain shortness of 

breath or cough no abdominal pain no diarrhea








Objective





- Vital Signs


Vital signs: 


                                   Vital Signs











Temp  98.1 F   05/27/22 05:41


 


Pulse  96   05/27/22 05:41


 


Resp  16   05/27/22 05:41


 


BP  155/76   05/27/22 05:41


 


Pulse Ox  99   05/27/22 05:41


 


FiO2      








                                 Intake & Output











 05/26/22 05/27/22 05/27/22





 18:59 06:59 18:59


 


Intake Total 1500 1240 


 


Output Total 800 1050 


 


Balance 700 190 


 


Weight   56.699 kg


 


Intake:   


 


  Intake, IV Titration 1500 700 





  Amount   


 


    Sodium Chloride 0.9% 1, 1400 700 





    000 ml @ 75 mls/hr IV .   





    [id number] [facility] Rx#:[id number]   


 


    cefTRIAXone 2 gm In 100  





    Sodium Chloride 0.9% 50   





    ml @ 100 mls/hr IVPB   





    [id number] [facility] Rx#:[id number]   


 


  Blood Product  540 


 


    Rc As-1  Unit  240 





    [id number]   


 


Output:   


 


  Urine 800 1050 


 


Other:   


 


  Voiding Method External Catheter External Catheter External Catheter














- Exam


GENERAL DESCRIPTION: An elderly female lying in bed in no distress





RESPIRATORY SYSTEM: Unlabored breathing , decreased breath sounds at bases





HEART: S1 S2 regular rate and rhythm ,





ABDOMEN: Soft , no tenderness





EXTREMITIES: No edema feet








- Labs


CBC & Chem 7: 


                                 05/28/22 05:49





                                 05/28/22 05:49


Labs: 


                  Abnormal Lab Results - Last 24 Hours (Table)











  05/26/22 05/26/22 05/26/22 Range/Units





  05:54 05:54 16:30 


 


WBC  12.46 H    (4.50-10.00)  X 10*3/uL


 


RBC  2.33 L    (4.10-5.20)  X 10*6/uL


 


Hgb  6.8 L*    (12.0-15.0)  g/dL


 


Hct  21.5 L    (37.2-46.3)  %


 


MCHC  31.6 L    (32.0-37.0)  g/dL


 


RDW  16.4 H    (11.5-14.5)  %


 


Metamyelocytes %  2 H    (0-0)  %


 


Myelocytes %  4 H    (0-0)  %


 


Neutrophils # (Manual)  9.22 H    (2.00-8.90)  X 10*3/uL


 


Est GFR (CKD-EPI)AfAm     (60.0-200.0)   


 


Est GFR (CKD-EPI)NonAf     (60.0-200.0)   


 


BUN/Creatinine Ratio     (12.00-20.00)  Ratio


 


Glucose     ()  mg/dL


 


Total Bilirubin   1.70 H   (0.30-1.20)  mg/dL


 


AST   47 H   (13-35)  U/L


 


ALT   117 H   (8-44)  U/L


 


Alkaline Phosphatase   451 H   ()  U/L


 


Total Protein   4.7 L   (6.2-8.2)  g/dL


 


Albumin   2.8 L   (3.8-4.9)  g/dL


 


Albumin/Globulin Ratio   1.52 L   (1.60-3.17)  g/dL


 


Crossmatch    See Detail  














  05/27/22 05/27/22 Range/Units





  06:11 06:11 


 


WBC  16.47 H   (4.50-10.00)  X 10*3/uL


 


RBC  3.36 L   (4.10-5.20)  X 10*6/uL


 


Hgb  9.8 L   (12.0-15.0)  g/dL


 


Hct  30.6 L   (37.2-46.3)  %


 


MCHC    (32.0-37.0)  g/dL


 


RDW  15.4 H   (11.5-14.5)  %


 


Metamyelocytes %    (0-0)  %


 


Myelocytes %  2 H   (0-0)  %


 


Neutrophils # (Manual)  14.00 H   (2.00-8.90)  X 10*3/uL


 


Est GFR (CKD-EPI)AfAm   55.3 L  (60.0-200.0)   


 


Est GFR (CKD-EPI)NonAf   47.7 L  (60.0-200.0)   


 


BUN/Creatinine Ratio   20.55 H  (12.00-20.00)  Ratio


 


Glucose   211 H  ()  mg/dL


 


Total Bilirubin   2.10 H  (0.30-1.20)  mg/dL


 


AST   53 H  (13-35)  U/L


 


ALT   122 H  (8-44)  U/L


 


Alkaline Phosphatase   503 H  ()  U/L


 


Total Protein   5.3 L  (6.2-8.2)  g/dL


 


Albumin   3.2 L  (3.8-4.9)  g/dL


 


Albumin/Globulin Ratio   1.52 L  (1.60-3.17)  g/dL


 


Crossmatch    








                      Microbiology - Last 24 Hours (Table)











 05/20/22 14:51 Blood Culture - Final





 Blood    No Growth after 144 hours


 


 05/20/22 15:11 Blood Culture - Final





 Blood    No Growth after 144 hours














Assessment and Plan


(1) Urinary tract infection


Current Visit: Yes   Status: Acute   Code(s): N39.0 - URINARY TRACT INFECTION, 

SITE NOT SPECIFIED   SNOMED Code(s): 00266628


   


Plan: 


1patient presented to hospital with weakness decreased appetite did have an 

episode of vomiting in this patient with underlying pancreatic cancer symptoms 

etiology is multifactorial likely component of dehydration as the patient has 

significantly weighted BUN/creatinine on admission plus minus a component of 

urinary tract infection with urine culture showing Klebsiella pneumonia blood 

culture has been negative.


2patient is currently being treated with Rocephin 2 g daily which will be 

continued while inpatient


3gentle IV fluid.

## 2022-05-28 NOTE — P.PN
Subjective


Progress Note Date: 05/26/22


Principal diagnosis: 


Klebsiella urinary tract infection





Patient is a 81-year-old  female with a past medical history 

significant for pancreatic cancer with recent stent placement at [facility], presented to hospital generalized weakness did have elevated white c

ount positive and concern for a symptomatic UTI.  Had been finalized Klebsiella


 on today's evaluation that is 05/26/2022, the patient denies having any fever 

or chills, breathing comfortably on room air no chest pain shortness of breath 

or cough no abdominal pain no diarrhea








Objective





- Vital Signs


Vital signs: 


                                   Vital Signs











Temp  98.3 F   05/26/22 07:43


 


Pulse  82   05/26/22 07:43


 


Resp  16   05/26/22 07:43


 


BP  114/69   05/26/22 07:43


 


Pulse Ox  98   05/26/22 07:43


 


FiO2      








                                 Intake & Output











 05/25/22 05/26/22 05/26/22





 18:59 06:59 18:59


 


Intake Total  875 


 


Output Total 950 400 


 


Balance -950 475 


 


Intake:   


 


  Intake, IV Titration  875 





  Amount   


 


    Sodium Chloride 0.9% 1,  875 





    000 ml @ 75 mls/hr IV .   





    [id number] [facility] Rx#:[id number]   


 


Output:   


 


  Urine 950 400 


 


Other:   


 


  Voiding Method  External Catheter External Catheter














- Exam


GENERAL DESCRIPTION: An elderly female lying in bed in no distress





RESPIRATORY SYSTEM: Unlabored breathing , decreased breath sounds at bases





HEART: S1 S2 regular rate and rhythm ,





ABDOMEN: Soft , no tenderness





EXTREMITIES: No edema feet








- Labs


CBC & Chem 7: 


                                 05/28/22 05:49





                                 05/28/22 05:49


Labs: 


                  Abnormal Lab Results - Last 24 Hours (Table)











  05/25/22 05/25/22 05/26/22 Range/Units





  04:46 04:46 05:54 


 


WBC  12.96 H    (4.50-10.00)  X 10*3/uL


 


RBC  2.61 L    (4.10-5.20)  X 10*6/uL


 


Hgb  7.5 L    (12.0-15.0)  g/dL


 


Hct  23.7 L    (37.2-46.3)  %


 


MCHC  31.6 L    (32.0-37.0)  g/dL


 


RDW  16.2 H    (11.5-14.5)  %


 


Immature Gran #  0.60 H    (0.00-0.04)  X 10*3/uL


 


Neutrophils #  8.67 H    (1.80-7.70)  X 10*3/uL


 


Monocytes #  1.16 H    (0.20-1.00)  X 10*3/uL


 


Anion Gap   9.50 L  7.60 L  (10.00-18.00)  mmol/L


 


Est GFR (CKD-EPI)AfAm   45.2 L  55.3 L  (60.0-200.0)   


 


Est GFR (CKD-EPI)NonAf   39.0 L  47.7 L  (60.0-200.0)   


 


BUN/Creatinine Ratio    22.18 H  (12.00-20.00)  Ratio


 


Glucose   239 H  252 H  ()  mg/dL


 


Total Bilirubin   1.90 H   (0.30-1.20)  mg/dL


 


AST   65 H   (13-35)  U/L


 


ALT   147 H   (8-44)  U/L


 


Alkaline Phosphatase   544 H   ()  U/L


 


Total Protein   4.9 L   (6.2-8.2)  g/dL


 


Albumin   2.9 L   (3.8-4.9)  g/dL


 


Albumin/Globulin Ratio   1.45 L   (1.60-3.17)  g/dL








                      Microbiology - Last 24 Hours (Table)











 05/20/22 15:11 Blood Culture - Preliminary





 Blood    No Growth after 120 hours


 


 05/20/22 14:51 Blood Culture - Preliminary





 Blood    No Growth after 120 hours














Assessment and Plan


(1) Urinary tract infection


Current Visit: Yes   Status: Acute   Code(s): N39.0 - URINARY TRACT INFECTION, 

SITE NOT SPECIFIED   SNOMED Code(s): 75158395


   


Plan: 


1patient presented to hospital with weakness decreased appetite did have an 

episode of vomiting in this patient with underlying pancreatic cancer symptoms 

etiology is multifactorial likely component of dehydration as the patient has 

significantly weighted BUN/creatinine on admission plus minus a component of 

urinary tract infection with urine culture showing Klebsiella pneumonia blood 

culture has been negative.


2patient to continue with Rocephin 2 g daily while inpatient


3gentle IV fluid.


 





Time with Patient: Less than 30

## 2022-05-29 LAB
ANION GAP SERPL CALC-SCNC: 13.4 MMOL/L (ref 10–18)
BASOPHILS # BLD MANUAL: 0.1 X 10*3/UL (ref 0–0.1)
BUN SERPL-SCNC: 30.1 MG/DL (ref 9–27)
BUN/CREAT SERPL: 22.3 RATIO (ref 12–20)
CALCIUM SPEC-MCNC: 9.1 MG/DL (ref 8.7–10.3)
CHLORIDE SERPL-SCNC: 110 MMOL/L (ref 96–109)
CO2 SERPL-SCNC: 24.4 MMOL/L (ref 20–27.5)
EOSINOPHIL # BLD MANUAL: 0.41 X 10*3/UL (ref 0.04–0.35)
ERYTHROCYTE [DISTWIDTH] IN BLOOD BY AUTOMATED COUNT: 2.65 X 10*6/UL (ref 4.1–5.2)
ERYTHROCYTE [DISTWIDTH] IN BLOOD: 16.4 % (ref 11.5–14.5)
GLUCOSE SERPL-MCNC: 298 MG/DL (ref 70–110)
HCT VFR BLD AUTO: 25.6 % (ref 37.2–46.3)
HGB BLD-MCNC: 7.8 G/DL (ref 12–15)
LYMPHOCYTES # BLD MANUAL: 1.73 X 10*3/UL (ref 0.9–5)
MCH RBC QN AUTO: 29.4 PG (ref 27–32)
MCHC RBC AUTO-ENTMCNC: 30.5 G/DL (ref 32–37)
MCV RBC AUTO: 96.6 FL (ref 80–97)
MONOCYTES # BLD MANUAL: 0.1 X 10*3/UL (ref 0.2–1)
NEUTROPHILS NFR BLD MANUAL: 77 %
NEUTS SEG # BLD MANUAL: 7.85 X 10*3/UL (ref 2–8.9)
NRBC BLD AUTO-RTO: 0 /100 WBCS (ref 0–0)
PLATELET # BLD AUTO: 266 X 10*3/UL (ref 140–440)
POTASSIUM SERPL-SCNC: 4.7 MMOL/L (ref 3.5–5.5)
SODIUM SERPL-SCNC: 147 MMOL/L (ref 135–145)
WBC # BLD AUTO: 10.2 X 10*3/UL (ref 4.5–10)

## 2022-05-29 RX ADMIN — CEFAZOLIN SCH: 330 INJECTION, POWDER, FOR SOLUTION INTRAMUSCULAR; INTRAVENOUS at 07:22

## 2022-05-29 RX ADMIN — MAGNESIUM HYDROXIDE SCH MG: 2400 SUSPENSION ORAL at 21:15

## 2022-05-29 RX ADMIN — MEMANTINE HYDROCHLORIDE SCH MG: 5 TABLET ORAL at 08:15

## 2022-05-29 RX ADMIN — MEMANTINE HYDROCHLORIDE SCH MG: 5 TABLET ORAL at 21:15

## 2022-05-29 RX ADMIN — HEPARIN SODIUM SCH UNIT: 5000 INJECTION INTRAVENOUS; SUBCUTANEOUS at 23:27

## 2022-05-29 RX ADMIN — CLOPIDOGREL BISULFATE SCH MG: 75 TABLET ORAL at 08:15

## 2022-05-29 RX ADMIN — ASPIRIN 81 MG CHEWABLE TABLET SCH MG: 81 TABLET CHEWABLE at 08:15

## 2022-05-29 RX ADMIN — HEPARIN SODIUM SCH UNIT: 5000 INJECTION INTRAVENOUS; SUBCUTANEOUS at 08:15

## 2022-05-29 RX ADMIN — HEPARIN SODIUM SCH UNIT: 5000 INJECTION INTRAVENOUS; SUBCUTANEOUS at 15:10

## 2022-05-29 RX ADMIN — PANTOPRAZOLE SODIUM SCH MG: 40 INJECTION, POWDER, FOR SOLUTION INTRAVENOUS at 08:24

## 2022-05-29 RX ADMIN — CHOLECALCIFEROL TAB 125 MCG (5000 UNIT) SCH MCG: 125 TAB at 08:15

## 2022-05-29 NOTE — P.PN
Subjective


Progress Note Date: 05/29/22





This is a 81-year-old female who was recently admitted with UTI and being 

closely monitored. ID and oncology following at this time and urine cultures 

growing klebsiella pneumonia. Patient hemoglobin is 6.8 today and will order one

unit of prbc. Patient is continued on IV ceftriaxone ad will continue. CT 

abdomen ordered per oncology. Recommend repeat am labs. Patient is afebrile and 

denies chest pain or shortness of breath. 





5/27/2022





Patient is seen today and family at the bedside. Patient WBC increased at 16 

today and is maintained on IV ceftriaxone with ESBL in the urine. hemoglobin 

improved and is stable at this time. ONcology following as well and work up in 

progress. ID following as well. Recommend repeat labs. Encouraged increased 

activity as tolerated. Patient is afebrile and denies chest pain or shortness of

breath. 





5/28/2022





Patient evaluated today and maintained on IV ceftriaxone and ID following for 

esbl klebsiella in the urine and has had 7 day course of abx and recommend m

onitoring closely off abx. Hemoglobin stable today and vss. Patient is afebrile 

and denies chest pain or shortness of breath. Patient with weakness and will 

have PT evaluate the patient. Recommend replacing electrolytes per protocol. 

Encouraged oral intake. Oncology following. 





5/29/2022





Patient seen in follow up today and sodium is slightly elevated at 147 and will 

adjust IV to D5 in water and repeat am labs. Hemoglobin dropped again today 

although greater than 7. WBC trending down and patient is afebrile. Currently 

off IV abx with ID monitoring closely. Patient denies chest pain or shortness of

breath. Oncology and palliative care following. Need to discuss treatment plan 

and discharge planning with family as well as social work and oncology. 








Review of systems:


Constitutional: No reports of fatigue, fever, or chills


Cardiovascular: No reports of chest pain or palpitations


Respiratory: No reports of shortness of breath or cough


GI:  no reports of nausea, no reports of of vomiting


: No reports of dysuria or retention


Neurovascular:  reports of generalized weakness





All medications have been reviewed





Active Medications





Amlodipine Besylate (Amlodipine 5 Mg Tab)  5 mg PO DAILY [facility]


   Last Admin: 05/29/22 08:15 Dose:  5 mg


   


Aspirin (Aspirin 81 Mg)  81 mg PO DAILY [facility]


   Last Admin: 05/29/22 08:15 Dose:  81 mg


   


Cholecalciferol (Cholecalciferol 125 Mcg (5000 Iu) Tablet)  125 mcg PO DAILY [facility]


   Last Admin: 05/29/22 08:15 Dose:  125 mcg


   


Clopidogrel Bisulfate (Clopidogrel 75 Mg Tab)  75 mg PO DAILY [facility]


   Last Admin: 05/29/22 08:15 Dose:  75 mg


   


Docusate Sodium (Docusate 100 Mg Cap)  100 mg PO DAILY PRN


   PRN Reason: Constipation


Heparin Sodium (Porcine) (Heparin Sodium,Porcine/Pf 5,000 Unit/0.5 Ml Syringe)  

5,000 unit SQ Q8HR [facility]


   Last Admin: 05/29/22 08:15 Dose:  5,000 unit


   


Dextrose/Water (Dextrose 5%-Water Iv Soln)  1,000 mls @ 75 mls/hr IV .[id number] 

ONE


   Stop: 05/30/22 00:21


Magnesium Hydroxide (Magnesium Hydroxide 2,400 Mg/10 Ml Cup)  2,400 mg PO HS [facility]


   Last Admin: 05/28/22 21:27 Dose:  2,400 mg


   


Memantine (Memantine 5 Mg Tab)  5 mg PO BID [facility]


   Last Admin: 05/29/22 08:15 Dose:  5 mg


   


Pantoprazole Sodium (Pantoprazole 40 Mg/10 Ml Vial)  40 mg IVP DAILY [facility]


   Last Admin: 05/29/22 08:24 Dose:  40 mg


   


PHYSICAL EXAMINATION: 





GENERAL: The patient is alert and oriented x1-2, thin built 


HEENT: Pupils are round and equally reacting to light. EOMI. no scleral icterus.

No conjunctival pallor. Normocephalic, atraumatic. No pharyngeal erythema. No 

thyromegaly.  


CARDIOVASCULAR: S1 and S2  muffled 


PULMONARY: diminished breath sounds bilaterally with no wheezing or rhonchi 

noted. 


ABDOMEN: soft.  Nontender on exam.   non-distended, normoactive bowel sounds. No

palpable organomegaly. 


MUSCULOSKELETAL: No joint swelling or deformity.


EXTREMITIES: No cyanosis, clubbing, or pedal edema. 


NEUROLOGICAL: Gross neurological examination did not reveal any focal deficits. 

Diffuse weakness


SKIN: No rashes. 





Assessment:





Acute urinary tract infection with klebsiella pneumonia and sepsis, present on 

admission, ESBL in the urine


right heel, unstageable ulcer, present on admission


altered mental status possible metabolic encephalopathy secondary to acute UTI, 

present on admission


Hypernatremia


Acute kidney injury


Hyperbilirubinemia


[facility]


COPD


GI prophylaxis


DVT prophylaxis


no code





Plan:





Recommend to continue with current medications and management with ID and 

oncology following.  Patient has had 7 days of IV abx and per ID will 

discontinue and monitor closely. Patient with esbl in the urine.  Patient 

hemoglobin is down to 7.8 today and no active bleeding noted. Sodium elevated 

and changing IV to D5 in water. Recommend repeat labs. Oncology following. Aw

aiting medical records from [facility]. Encouraged increased activity as 

tolerated. Encouraged oral intake. Due to multiple complex medical issues, 

prognosis is guarded. PT/OT to evaluate. Need to discuss treatment plan and 

discharge planning with family and oncology. Palliative nurse following as well.

 








The impression and plan of care has been dictated by nurse jessica Zambrano

ctitioner as directed.





Dr. Gala MD


I have performed a history and examination and MDM of this patient, discussed 

the same with the dictator, and  agree with the dictator's assessment and plan 

as written ,documented as a scribe. Based on total visit time,  I have performed

more than 50% of the visit. Any additional findings or plans will be noted. 





Objective





- Vital Signs


Vital signs: 


                                   Vital Signs











Temp  98.3 F   05/29/22 07:36


 


Pulse  95   05/29/22 07:36


 


Resp  18   05/29/22 07:36


 


BP  134/74   05/29/22 07:36


 


Pulse Ox  95   05/29/22 07:36


 


FiO2      








                                 Intake & Output











 05/28/22 05/29/22 05/29/22





 18:59 06:59 18:59


 


Output Total 600 900 


 


Balance -600 -900 


 


Output:   


 


  Urine 600 900 


 


Other:   


 


  Voiding Method  External Catheter 


 


  # Voids 3  














- Labs


CBC & Chem 7: 


                                 05/29/22 05:25





                                 05/29/22 05:25


Labs: 


                  Abnormal Lab Results - Last 24 Hours (Table)











  05/29/22 05/29/22 Range/Units





  05:25 05:25 


 


WBC  10.20 H   (4.50-10.00)  X 10*3/uL


 


RBC  2.65 L   (4.10-5.20)  X 10*6/uL


 


Hgb  7.8 L   (12.0-15.0)  g/dL


 


Hct  25.6 L   (37.2-46.3)  %


 


MCHC  30.5 L   (32.0-37.0)  g/dL


 


RDW  16.4 H   (11.5-14.5)  %


 


Monocytes # (Manual)  0.10 L   (0.20-1.00)  X 10*3/uL


 


Eosinophils # (Manual)  0.41 H   (0.04-0.35)  X 10*3/uL


 


Sodium   147 H  (135-145)  mmol/L


 


Chloride   110 H  ()  mmol/L


 


BUN   30.1 H  (9.0-27.0)  mg/dL


 


Est GFR (CKD-EPI)AfAm   43.2 L  (60.0-200.0)   


 


Est GFR (CKD-EPI)NonAf   37.3 L  (60.0-200.0)   


 


BUN/Creatinine Ratio   22.30 H  (12.00-20.00)  Ratio


 


Glucose   298 H  ()  mg/dL


 


C-Reactive Protein   1.00 H  (0.00-0.80)  mg/dL

## 2022-05-30 LAB
ANION GAP SERPL CALC-SCNC: 5 MMOL/L
BASOPHILS # BLD AUTO: 0.1 K/UL (ref 0–0.2)
BASOPHILS NFR BLD AUTO: 1 %
BUN SERPL-SCNC: 37 MG/DL (ref 7–17)
CALCIUM SPEC-MCNC: 9.2 MG/DL (ref 8.4–10.2)
CHLORIDE SERPL-SCNC: 101 MMOL/L (ref 98–107)
CO2 SERPL-SCNC: 28 MMOL/L (ref 22–30)
EOSINOPHIL # BLD AUTO: 0.3 K/UL (ref 0–0.7)
EOSINOPHIL NFR BLD AUTO: 3 %
ERYTHROCYTE [DISTWIDTH] IN BLOOD BY AUTOMATED COUNT: 2.7 M/UL (ref 3.8–5.4)
ERYTHROCYTE [DISTWIDTH] IN BLOOD: 16.5 % (ref 11.5–15.5)
GLUCOSE BLD-MCNC: 446 MG/DL (ref 75–99)
GLUCOSE SERPL-MCNC: 445 MG/DL (ref 74–99)
HCT VFR BLD AUTO: 26.3 % (ref 34–46)
HGB BLD-MCNC: 8.2 GM/DL (ref 11.4–16)
LYMPHOCYTES # SPEC AUTO: 1.6 K/UL (ref 1–4.8)
LYMPHOCYTES NFR SPEC AUTO: 16 %
MCH RBC QN AUTO: 30.5 PG (ref 25–35)
MCHC RBC AUTO-ENTMCNC: 31.3 G/DL (ref 31–37)
MCV RBC AUTO: 97.6 FL (ref 80–100)
MONOCYTES # BLD AUTO: 0.4 K/UL (ref 0–1)
MONOCYTES NFR BLD AUTO: 4 %
NEUTROPHILS # BLD AUTO: 7.5 K/UL (ref 1.3–7.7)
NEUTROPHILS NFR BLD AUTO: 74 %
PLATELET # BLD AUTO: 276 K/UL (ref 150–450)
POTASSIUM SERPL-SCNC: 4.4 MMOL/L (ref 3.5–5.1)
SODIUM SERPL-SCNC: 134 MMOL/L (ref 137–145)
WBC # BLD AUTO: 10.1 K/UL (ref 3.8–10.6)

## 2022-05-30 RX ADMIN — PANTOPRAZOLE SODIUM SCH MG: 40 INJECTION, POWDER, FOR SOLUTION INTRAVENOUS at 09:25

## 2022-05-30 RX ADMIN — CLOPIDOGREL BISULFATE SCH MG: 75 TABLET ORAL at 08:31

## 2022-05-30 RX ADMIN — ASPIRIN 81 MG CHEWABLE TABLET SCH MG: 81 TABLET CHEWABLE at 08:31

## 2022-05-30 RX ADMIN — MEMANTINE HYDROCHLORIDE SCH MG: 5 TABLET ORAL at 20:06

## 2022-05-30 RX ADMIN — MAGNESIUM HYDROXIDE SCH MG: 2400 SUSPENSION ORAL at 20:06

## 2022-05-30 RX ADMIN — HEPARIN SODIUM SCH UNIT: 5000 INJECTION INTRAVENOUS; SUBCUTANEOUS at 16:56

## 2022-05-30 RX ADMIN — HEPARIN SODIUM SCH: 5000 INJECTION INTRAVENOUS; SUBCUTANEOUS at 23:14

## 2022-05-30 RX ADMIN — MEMANTINE HYDROCHLORIDE SCH MG: 5 TABLET ORAL at 08:31

## 2022-05-30 RX ADMIN — HEPARIN SODIUM SCH UNIT: 5000 INJECTION INTRAVENOUS; SUBCUTANEOUS at 08:31

## 2022-05-30 RX ADMIN — CHOLECALCIFEROL TAB 125 MCG (5000 UNIT) SCH MCG: 125 TAB at 08:31

## 2022-05-30 NOTE — P.PN
Subjective


Progress Note Date: 05/30/22





This is a 81-year-old female who was recently admitted with UTI and being 

closely monitored. ID and oncology following at this time and urine cultures 

growing klebsiella pneumonia. Patient hemoglobin is 6.8 today and will order one

unit of prbc. Patient is continued on IV ceftriaxone ad will continue. CT 

abdomen ordered per oncology. Recommend repeat am labs. Patient is afebrile and 

denies chest pain or shortness of breath. 





5/27/2022





Patient is seen today and family at the bedside. Patient WBC increased at 16 

today and is maintained on IV ceftriaxone with ESBL in the urine. hemoglobin 

improved and is stable at this time. ONcology following as well and work up in 

progress. ID following as well. Recommend repeat labs. Encouraged increased 

activity as tolerated. Patient is afebrile and denies chest pain or shortness of

breath. 





5/28/2022





Patient evaluated today and maintained on IV ceftriaxone and ID following for 

esbl klebsiella in the urine and has had 7 day course of abx and recommend m

onitoring closely off abx. Hemoglobin stable today and vss. Patient is afebrile 

and denies chest pain or shortness of breath. Patient with weakness and will 

have PT evaluate the patient. Recommend replacing electrolytes per protocol. 

Encouraged oral intake. Oncology following. 





5/29/2022





Patient seen in follow up today and sodium is slightly elevated at 147 and will 

adjust IV to D5 in water and repeat am labs. Hemoglobin dropped again today 

although greater than 7. WBC trending down and patient is afebrile. Currently 

off IV abx with ID monitoring closely. Patient denies chest pain or shortness of

breath. Oncology and palliative care following. Need to discuss treatment plan 

and discharge planning with family as well as social work and oncology. 





05/30/2022





She is seen in follow-up this morning and sodium level has improved and is 

currently 134 from 147 yesterday and will discontinue IV fluids.  Potassium at 

4.4, creatinine trending down at 1.23 and glucose is elevated and will give a 

dose of insulin as patient was maintained on D5 which could possibly be a 

component of elevated glucose.  Patient does not have history of diabetes.  

Oncology also following and awaiting reports from other facility and palliative 

care also following and plan is for home with palliative care per family.  

Patient is afebrile and denies any nausea or vomiting.  Patient denies shortness

of breath or chest pain.








Review of systems:


Constitutional: No reports of fatigue, fever, or chills


Cardiovascular: No reports of chest pain or palpitations


Respiratory: No reports of shortness of breath or cough


GI:  no reports of nausea, no reports of of vomiting


: No reports of dysuria or retention


Neurovascular:  reports of generalized weakness





All medications have been reviewed





Active Medications





Amlodipine Besylate (Amlodipine 5 Mg Tab)  5 mg PO DAILY [facility]


   Last Admin: 05/30/22 08:31 Dose:  5 mg


   


Aspirin (Aspirin 81 Mg)  81 mg PO DAILY [facility]


   Last Admin: 05/30/22 08:31 Dose:  81 mg


   


Cholecalciferol (Cholecalciferol 125 Mcg (5000 Iu) Tablet)  125 mcg PO DAILY [facility]


   Last Admin: 05/30/22 08:31 Dose:  125 mcg


   


Clopidogrel Bisulfate (Clopidogrel 75 Mg Tab)  75 mg PO DAILY [facility]


   Last Admin: 05/30/22 08:31 Dose:  75 mg


   


Docusate Sodium (Docusate 100 Mg Cap)  100 mg PO DAILY [facility]


   PRN Reason: Constipation


Heparin Sodium (Porcine) (Heparin Sodium,Porcine/Pf 5,000 Unit/0.5 Ml Syringe)  

5,000 unit SQ Q8HR [facility]


   Last Admin: 05/30/22 08:31 Dose:  5,000 unit


   


Magnesium Hydroxide (Magnesium Hydroxide 2,400 Mg/10 Ml Cup)  2,400 mg PO HS [facility]


   Last Admin: 05/29/22 21:15 Dose:  2,400 mg


   


Memantine (Memantine 5 Mg Tab)  5 mg PO BID [facility]


   Last Admin: 05/30/22 08:31 Dose:  5 mg


   


Pantoprazole Sodium (Pantoprazole 40 Mg/10 Ml Vial)  40 mg IVP DAILY [facility]


   Last Admin: 05/30/22 09:25 Dose:  40 mg


   








   


PHYSICAL EXAMINATION: 





GENERAL: The patient is alert and oriented x1-2, thin built 


HEENT: Pupils are round and equally reacting to light. EOMI. no scleral icterus.

No conjunctival pallor. Normocephalic, atraumatic. No pharyngeal erythema. No 

thyromegaly.  


CARDIOVASCULAR: S1 and S2  muffled 


PULMONARY: diminished breath sounds bilaterally with no wheezing or rhonchi 

noted. 


ABDOMEN: soft.  Nontender on exam.   non-distended, normoactive bowel sounds. No

palpable organomegaly. 


MUSCULOSKELETAL: No joint swelling or deformity.


EXTREMITIES: No cyanosis, clubbing, or pedal edema. 


NEUROLOGICAL: Gross neurological examination did not reveal any focal deficits. 

Diffuse weakness


SKIN: No rashes. 





Assessment:





Acute urinary tract infection with klebsiella pneumonia and sepsis, present on 

admission, ESBL in the urine


right heel, unstageable ulcer, present on admission


altered mental status possible metabolic encephalopathy secondary to acute UTI, 

present on admission


Hypernatremia, improved


Acute kidney injury


Hyperbilirubinemia


Recent ERCP and biliary stent placement at [facility] for pancreatic cancer


COPD


GI prophylaxis


DVT prophylaxis


no code





Plan:





Recommend to continue with current medications and management with ID and 

oncology following.  Patient has had 7 days of IV abx and per ID will 

discontinue and monitor closely. Patient with esbl in the urine.  Patient 

hemoglobin is stable at 8.2 today and no active bleeding noted.  Recommend 

repeat labs. Oncology following. Awaiting medical records from [facility].  

Patients blood sugar elevated on this morning's labs most likely secondary to 

the D5 she was on and will monitor closely and give a one-time dose of insulin 

and monitor Accu-Cheks and this was discussed with nursing staff.  Encouraged 

increased activity as tolerated. Encouraged oral intake. Due to multiple complex

medical issues, prognosis is guarded. PT/OT to evaluate. Need to discuss 

treatment plan and discharge planning with family and oncology. Palliative nurse

following as well.  Per family and nursing staff plan is for palliative care 

home tomorrow and will possibly discharge in 24 hours. 








The impression and plan of care has been dictated by Rebecca Patterson, nurse 

practitioner as directed.





Dr. Fermín MD


I have performed a history and examination and MDM of this patient, discussed 

the same with the dictator, and  agree with the dictator's assessment and plan 

as written ,documented as a scribe. Based on total visit time,  I have performed

more than 50% of the visit. Any additional findings or plans will be noted. 





Objective





- Vital Signs


Vital signs: 


                                   Vital Signs











Temp  98.8 F   05/30/22 10:01


 


Pulse  92   05/30/22 10:01


 


Resp  16   05/30/22 10:01


 


BP  153/74   05/30/22 10:01


 


Pulse Ox  97   05/30/22 10:01


 


FiO2      








                                 Intake & Output











 05/29/22 05/30/22 05/30/22





 18:59 06:59 18:59


 


Intake Total  900 


 


Output Total 1000 900 


 


Balance -1000 0 


 


Intake:   


 


  Intake, IV Titration  900 





  Amount   


 


    Dextrose 5% in Water 1,  900 





    000 ml @ 75 mls/hr IV .   





    [id number] Rx#:[id number]   


 


Output:   


 


  Urine 1000 900 


 


Other:   


 


  Voiding Method  External Catheter External Catheter














- Labs


CBC & Chem 7: 


                                 05/30/22 10:57





                                 05/30/22 10:57

## 2022-05-31 LAB
GLUCOSE BLD-MCNC: 119 MG/DL (ref 75–99)
GLUCOSE BLD-MCNC: 211 MG/DL (ref 75–99)
GLUCOSE BLD-MCNC: 288 MG/DL (ref 75–99)
GLUCOSE BLD-MCNC: 338 MG/DL (ref 75–99)

## 2022-05-31 RX ADMIN — HEPARIN SODIUM SCH UNIT: 5000 INJECTION INTRAVENOUS; SUBCUTANEOUS at 08:12

## 2022-05-31 RX ADMIN — HEPARIN SODIUM SCH UNIT: 5000 INJECTION INTRAVENOUS; SUBCUTANEOUS at 23:47

## 2022-05-31 RX ADMIN — CLOPIDOGREL BISULFATE SCH MG: 75 TABLET ORAL at 08:12

## 2022-05-31 RX ADMIN — MAGNESIUM HYDROXIDE SCH MG: 2400 SUSPENSION ORAL at 20:50

## 2022-05-31 RX ADMIN — INSULIN ASPART SCH: 100 INJECTION, SOLUTION INTRAVENOUS; SUBCUTANEOUS at 16:31

## 2022-05-31 RX ADMIN — MEMANTINE HYDROCHLORIDE SCH MG: 5 TABLET ORAL at 08:12

## 2022-05-31 RX ADMIN — CHOLECALCIFEROL TAB 125 MCG (5000 UNIT) SCH MCG: 125 TAB at 08:12

## 2022-05-31 RX ADMIN — PANTOPRAZOLE SODIUM SCH MG: 40 INJECTION, POWDER, FOR SOLUTION INTRAVENOUS at 08:05

## 2022-05-31 RX ADMIN — HEPARIN SODIUM SCH UNIT: 5000 INJECTION INTRAVENOUS; SUBCUTANEOUS at 16:49

## 2022-05-31 RX ADMIN — INSULIN ASPART SCH UNIT: 100 INJECTION, SOLUTION INTRAVENOUS; SUBCUTANEOUS at 11:46

## 2022-05-31 RX ADMIN — ASPIRIN 81 MG CHEWABLE TABLET SCH MG: 81 TABLET CHEWABLE at 08:12

## 2022-05-31 RX ADMIN — MEMANTINE HYDROCHLORIDE SCH MG: 5 TABLET ORAL at 20:50

## 2022-05-31 RX ADMIN — INSULIN ASPART SCH UNIT: 100 INJECTION, SOLUTION INTRAVENOUS; SUBCUTANEOUS at 20:49

## 2022-05-31 NOTE — P.PN
Subjective


Progress Note Date: 05/31/22


Principal diagnosis: 


UTI





Patient is a 81-year-old female with a known history of hypertension, 

hyperlipidemia, COPD, dementia and previous history of smoking and recent ERCP 

and stent placement and fine-needle aspiration biopsy of the pancreatic mass 

suspected cancer at [facility] was brought to hospital by her  

due to generalized weakness and tiredness.  Patient has not been eating or 

drinking since morning and has episode of vomiting.  She was also slightly more 

confused than normal.  And has been less expressive and responsive.  No fever no

chills.  No chest pain or shortness of breath.  No diarrhea.  No cough or sputum

production.  Patient has been evaluated at home.  Patient does complain of 

occasional abdominal pain as per her .





Patient was discharged from [facility] about a week ago where he had 

biliary stent placement and fine-needle aspiration biopsy was done. She was 

diagnosis with pancreatic cancer and was scheduled to see Dr. Fox in office on 

June 8th, however is now admitted. She was found to have possible UTI, treated. 

She is receiving IV Fluids and has become more awake per . The details of

her cancer are not known as the records from [facility] have not yet been sent.





5/24  The patient is resting in bed and appears comfortable. Her  states 

her mentation is clearing someone and she is back to her baseline which is A&O x

1.  He also states that her appetite is improving.  Awaiting records from [facility] for further recs from oncology on new diagnosis of pancreatic cancer. 

Patient's  would like to speak to Dr. Fox before trying to establish 

goals of care.  Will follow up with patient and her  tomorrow. 





5/25 The patient is sleeping in bed sleeping.  Her  states that her 

appetite is improving.  Awaiting records from [facility] for further recs from 

oncology on new diagnosis of pancreatic cancer. Patient's  would like to 

speak to Dr. Fox before trying to establish goals of care.  Will follow up with 

patient and her  tomorrow. 





5/26 The patient is sleeping in bed and appears comfortable.  Awaiting records 

from [facility] for further recs from oncology on new diagnosis of pancreatic 

cancer. Patient's  would like to speak to Dr. Fox before trying to 

establish goals of care. Continue with antibiotic treatment for UTI. Hgb 6.9 

will receive 1 unit RBC's. Will follow up with patient and her  tomorrow.







5/27 The patient is resting in bed, in pleasantly confused, and appears 

comfortable.  Abd CT shows significant fecal load to rectum and colon.  Dulcolax

suppository ordered.  Awaiting records from [facility] for further recs from 

oncology on new diagnosis of pancreatic cancer. Patient's  would like to 

speak to Dr. Fox before trying to establish goals of care. Continue with 

antibiotic treatment for UTI. Hgb 9.8 today.








Objective





- Vital Signs


Vital signs: 


                                   Vital Signs











Temp  97.8 F   05/31/22 08:02


 


Pulse  82   05/31/22 08:02


 


Resp  16   05/31/22 08:15


 


BP  147/70   05/31/22 08:02


 


Pulse Ox  97   05/31/22 08:02


 


FiO2      








                                 Intake & Output











 05/30/22 05/31/22 05/31/22





 18:59 06:59 18:59


 


Intake Total 1320  


 


Output Total 1900 550 


 


Balance -580 -550 


 


Intake:   


 


  Oral 1320  


 


Output:   


 


  Urine 1900 550 


 


Other:   


 


  Voiding Method External Catheter Indwelling Catheter Indwelling Catheter


 


  # Voids  3 


 


  # Bowel Movements 1 1 














- Exam


General: Patient awake alert. Oriented x 1, to person.  No acute distress.


HEENT: Head is atraumatic, normocephalic Neck is supple. Sclerae are clear. 

Pupils equal, round and reactive to light bilaterally.  


CV: Heart regular in rate and rhythm positive S1 and S2.  No S3. No S4.  No 

clicks, rubs or murmurs. No JVD. Peripheral pulses equal. 2/4


Lungs: Clear to auscultation bilaterally.  No wheezes rales or rhonchi. 

Respirations even and nonlabored. No intercostal retractions.


Abdomen/GI: Soft. Bowel sounds present in all 4 quadrants. Bowel sounds 

normoactive. No abdominal tenderness.  


Musculoskeletal/ Extremities: No tenderness on muscular exam.  No ecchymosis.


Vascular: Radial pulses equal. 2/4.


Skin: No rash. 


Neurologic: Awake, alert and oriented x 1. 


Psychiatric: Appropriate mood, flat affect.








- Labs


CBC & Chem 7: 


                                 05/30/22 10:57





                                 05/30/22 10:57


Labs: 


                  Abnormal Lab Results - Last 24 Hours (Table)











  05/30/22 05/30/22 05/30/22 Range/Units





  10:57 10:57 16:42 


 


RBC  2.70 L    (3.80-5.40)  m/uL


 


Hgb  8.2 L    (11.4-16.0)  gm/dL


 


Hct  26.3 L    (34.0-46.0)  %


 


RDW  16.5 H    (11.5-15.5)  %


 


Sodium   134 L   (137-145)  mmol/L


 


BUN   37 H   (7-17)  mg/dL


 


Creatinine   1.23 H   (0.52-1.04)  mg/dL


 


Glucose   445 H   (74-99)  mg/dL


 


POC Glucose (mg/dL)    446 H  (75-99)  mg/dL














  05/31/22 Range/Units





  09:41 


 


RBC   (3.80-5.40)  m/uL


 


Hgb   (11.4-16.0)  gm/dL


 


Hct   (34.0-46.0)  %


 


RDW   (11.5-15.5)  %


 


Sodium   (137-145)  mmol/L


 


BUN   (7-17)  mg/dL


 


Creatinine   (0.52-1.04)  mg/dL


 


Glucose   (74-99)  mg/dL


 


POC Glucose (mg/dL)  338 H  (75-99)  mg/dL














Assessment and Plan


Assessment: 


Symptoms


* Pain - 0/10


* Fatigue - yes


* weakness - yes


* SOB - no


* Insomnia - no


* N/V - yes


* Anxiety - no


* Depression - no


* Confusion - yes, continue namenda


* Agitation - no


* Hallucinations - no


* Appetite/weight loss - decreased appetite since biliary stent placement, 5-7lb

  weight loss in past 3 weeks, appetite improving


* Dysphagia - no


* Constipation - no, continue colace prn and mom. LBM 5/31


* Incontinence - yes, wears briefs


* Itch - no





Plan: 


Summary/Goals - The patient is watching television in bed. She states she ate a 

good breakfast and denies any pain.  Her  is not at the bedside today.  

Per oncology -review of CT reveals locally advanced disease. The plan was to be 

discussed with the patient's over the weekend - to see Dr. Mcgregor after discharge 

for potential surgical intervention versus options of samantha-adjuvant treatment.





The patient's  is here now feeding her lunch.  He seems confused as to 

what treatment options are available, if any, for her pancreatic cancer and 

needs clarification from the oncology team.  His main focus is getting her home.

 





Recommendation - To be discharged home with home health aid, visiting RN, PT/OT,

and palliative care. Follow up with oncology as planned.





Advanced Directives - information given





Code Status - DNR





Thank you for this consult





Nicci DUDLEY-BC


Palliative Care


[facility] [id number]


Email: [email or web address]








Time with Patient: Less than 30

## 2022-05-31 NOTE — P.PN
Subjective


Progress Note Date: 05/31/22





This is a 81-year-old female who was recently admitted with UTI and being 

closely monitored. ID and oncology following at this time and urine cultures 

growing klebsiella pneumonia. Patient hemoglobin is 6.8 today and will order one

unit of prbc. Patient is continued on IV ceftriaxone ad will continue. CT 

abdomen ordered per oncology. Recommend repeat am labs. Patient is afebrile and 

denies chest pain or shortness of breath. 





5/27/2022





Patient is seen today and family at the bedside. Patient WBC increased at 16 

today and is maintained on IV ceftriaxone with ESBL in the urine. hemoglobin 

improved and is stable at this time. ONcology following as well and work up in 

progress. ID following as well. Recommend repeat labs. Encouraged increased 

activity as tolerated. Patient is afebrile and denies chest pain or shortness of

breath. 





5/28/2022





Patient evaluated today and maintained on IV ceftriaxone and ID following for 

esbl klebsiella in the urine and has had 7 day course of abx and recommend m

onitoring closely off abx. Hemoglobin stable today and vss. Patient is afebrile 

and denies chest pain or shortness of breath. Patient with weakness and will 

have PT evaluate the patient. Recommend replacing electrolytes per protocol. 

Encouraged oral intake. Oncology following. 





5/29/2022





Patient seen in follow up today and sodium is slightly elevated at 147 and will 

adjust IV to D5 in water and repeat am labs. Hemoglobin dropped again today 

although greater than 7. WBC trending down and patient is afebrile. Currently 

off IV abx with ID monitoring closely. Patient denies chest pain or shortness of

breath. Oncology and palliative care following. Need to discuss treatment plan 

and discharge planning with family as well as social work and oncology. 





05/30/2022





She is seen in follow-up this morning and sodium level has improved and is 

currently 134 from 147 yesterday and will discontinue IV fluids.  Potassium at 

4.4, creatinine trending down at 1.23 and glucose is elevated and will give a 

dose of insulin as patient was maintained on D5 which could possibly be a 

component of elevated glucose.  Patient does not have history of diabetes.  

Oncology also following and awaiting reports from other facility and palliative 

care also following and plan is for home with palliative care per family.  

Patient is afebrile and denies any nausea or vomiting.  Patient denies shortness

of breath or chest pain.





5/31/2022





Patient is seen this morning with  at the bedside. Blood sugars remain 

elevated and will add sliding scale and continue accuchecks achs. Patient was 

recently on d5 and water to correct the elevated sodium. Patient is not 

diabetic. Plan is for palliative care on discharge and will follow with surgeon 

of layne mazariegos about test results and possible oncology in the outpatient 

setting. Encouraged oral intake and close monitoring. Patient is afebrile and 

off antibiotics for UTI with ID following. Patient denies chest pain or 

shortness of breath. 








Review of systems:


Constitutional: No reports of fatigue, fever, or chills


Cardiovascular: No reports of chest pain or palpitations


Respiratory: No reports of shortness of breath or cough


GI:  no reports of nausea, no reports of of vomiting


: No reports of dysuria or retention


Neurovascular:  reports of generalized weakness





All medications have been reviewed





Active Medications





Amlodipine Besylate (Amlodipine 5 Mg Tab)  5 mg PO DAILY [facility]


   Last Admin: 05/31/22 08:12 Dose:  5 mg


   


Aspirin (Aspirin 81 Mg)  81 mg PO DAILY [facility]


   Last Admin: 05/31/22 08:12 Dose:  81 mg


   


Cholecalciferol (Cholecalciferol 125 Mcg (5000 Iu) Tablet)  125 mcg PO DAILY [facility]


   Last Admin: 05/31/22 08:12 Dose:  125 mcg


   


Clopidogrel Bisulfate (Clopidogrel 75 Mg Tab)  75 mg PO DAILY [facility]


   Last Admin: 05/31/22 08:12 Dose:  75 mg


   


Docusate Sodium (Docusate 100 Mg Cap)  100 mg PO DAILY [facility]


   PRN Reason: Constipation


Heparin Sodium (Porcine) (Heparin Sodium,Porcine/Pf 5,000 Unit/0.5 Ml Syringe)  

5,000 unit SQ Q8HR [facility]


   Last Admin: 05/31/22 16:49 Dose:  5,000 unit


   


Insulin Aspart (Insulin Aspart (Novolog) 100 Unit/Ml Vial)  0 unit SQ ACHS [facility]; 

Protocol


   Last Admin: 05/31/22 16:31 Dose:  Not Given


   


Magnesium Hydroxide (Magnesium Hydroxide 2,400 Mg/10 Ml Cup)  2,400 mg PO HS [facility]


   Last Admin: 05/30/22 20:06 Dose:  2,400 mg


   


Memantine (Memantine 5 Mg Tab)  5 mg PO BID [facility]


   Last Admin: 05/31/22 08:12 Dose:  5 mg


   


Pantoprazole Sodium (Pantoprazole 40 Mg/10 Ml Vial)  40 mg IVP DAILY [facility]


   Last Admin: 05/31/22 08:05 Dose:  40 mg


   








   


PHYSICAL EXAMINATION: 





GENERAL: The patient is alert and oriented x1-2, thin built 


HEENT: Pupils are round and equally reacting to light. EOMI. no scleral icterus.

No conjunctival pallor. Normocephalic, atraumatic. No pharyngeal erythema. No 

thyromegaly.  


CARDIOVASCULAR: S1 and S2  muffled 


PULMONARY: diminished breath sounds bilaterally with no wheezing or rhonchi note

d. 


ABDOMEN: soft.  Nontender on exam.   non-distended, normoactive bowel sounds. No

palpable organomegaly. 


MUSCULOSKELETAL: No joint swelling or deformity.


EXTREMITIES: No cyanosis, clubbing, or pedal edema. 


NEUROLOGICAL: Gross neurological examination did not reveal any focal deficits. 

Diffuse weakness


SKIN: No rashes. 





Assessment:





Acute urinary tract infection with klebsiella pneumonia and sepsis, present on 

admission, ESBL in the urine


right heel, unstageable ulcer, present on admission


altered mental status possible metabolic encephalopathy secondary to acute UTI, 

present on admission


Hypernatremia, improved


elevated blood glucose, most likely due to dextrose in water IV solution to 

correct the hypernatremia


Acute kidney injury


Hyperbilirubinemia


Recent ERCP and biliary stent placement at [facility] for pancreatic cancer


COPD


GI prophylaxis


DVT prophylaxis


no code





Plan:





Recommend to continue with current medications and management with ID and 

oncology following.  Patient has had 7 days of IV abx and per ID is off abx and 

monitor closely. Patient with esbl in the urine. Oncology following. Awaiting 

medical records from [facility].  Patients blood sugar elevated and will add 

sliding scale and monitor Accu-Cheks and this was discussed with nursing staff. 

Initially ordered low dose long acting as well but blood sugars trending down 

and will continue just sliding scale for now. Encouraged increased activity as 

tolerated. Encouraged oral intake. Due to multiple complex medical issues, 

prognosis is guarded. Palliative nurse following as well.  Per family and 

nursing staff plan is for palliative care home tomorrow and will possibly 

discharge in 24 hours. 








The impression and plan of care has been dictated by Rebecca Patterson, nurse 

practitioner as directed.





Dr. Fermín MD


I have performed a history and examination and MDM of this patient, discussed t

he same with the dictator, and  agree with the dictator's assessment and plan as

written ,documented as a scribe. Based on total visit time,  I have performed 

more than 50% of the visit. Any additional findings or plans will be noted. 





Objective





- Vital Signs


Vital signs: 


                                   Vital Signs











Temp  97.8 F   05/31/22 08:02


 


Pulse  82   05/31/22 08:02


 


Resp  16   05/31/22 08:15


 


BP  147/70   05/31/22 08:02


 


Pulse Ox  97   05/31/22 08:02


 


FiO2      








                                 Intake & Output











 05/30/22 05/31/22 05/31/22





 18:59 06:59 18:59


 


Intake Total 1320  


 


Output Total 1900 550 


 


Balance -580 -550 


 


Intake:   


 


  Oral 1320  


 


Output:   


 


  Urine 1900 550 


 


Other:   


 


  Voiding Method External Catheter Indwelling Catheter Indwelling Catheter


 


  # Voids  3 


 


  # Bowel Movements 1 1 














- Labs


CBC & Chem 7: 


                                 05/30/22 10:57





                                 05/30/22 10:57


Labs: 


                  Abnormal Lab Results - Last 24 Hours (Table)











  05/30/22 05/30/22 05/30/22 Range/Units





  10:57 10:57 16:42 


 


RBC  2.70 L    (3.80-5.40)  m/uL


 


Hgb  8.2 L    (11.4-16.0)  gm/dL


 


Hct  26.3 L    (34.0-46.0)  %


 


RDW  16.5 H    (11.5-15.5)  %


 


Sodium   134 L   (137-145)  mmol/L


 


BUN   37 H   (7-17)  mg/dL


 


Creatinine   1.23 H   (0.52-1.04)  mg/dL


 


Glucose   445 H   (74-99)  mg/dL


 


POC Glucose (mg/dL)    446 H  (75-99)  mg/dL

## 2022-06-01 VITALS
RESPIRATION RATE: 17 BRPM | TEMPERATURE: 98.7 F | HEART RATE: 96 BPM | SYSTOLIC BLOOD PRESSURE: 132 MMHG | DIASTOLIC BLOOD PRESSURE: 70 MMHG

## 2022-06-01 LAB
GLUCOSE BLD-MCNC: 178 MG/DL (ref 75–99)
GLUCOSE BLD-MCNC: 265 MG/DL (ref 75–99)

## 2022-06-01 RX ADMIN — PANTOPRAZOLE SODIUM SCH MG: 40 INJECTION, POWDER, FOR SOLUTION INTRAVENOUS at 07:14

## 2022-06-01 RX ADMIN — INSULIN ASPART SCH UNIT: 100 INJECTION, SOLUTION INTRAVENOUS; SUBCUTANEOUS at 08:01

## 2022-06-01 RX ADMIN — CLOPIDOGREL BISULFATE SCH MG: 75 TABLET ORAL at 08:01

## 2022-06-01 RX ADMIN — HEPARIN SODIUM SCH UNIT: 5000 INJECTION INTRAVENOUS; SUBCUTANEOUS at 08:00

## 2022-06-01 RX ADMIN — ASPIRIN 81 MG CHEWABLE TABLET SCH MG: 81 TABLET CHEWABLE at 08:01

## 2022-06-01 RX ADMIN — INSULIN ASPART SCH UNIT: 100 INJECTION, SOLUTION INTRAVENOUS; SUBCUTANEOUS at 11:42

## 2022-06-01 RX ADMIN — CHOLECALCIFEROL TAB 125 MCG (5000 UNIT) SCH MCG: 125 TAB at 08:01

## 2022-06-01 RX ADMIN — MEMANTINE HYDROCHLORIDE SCH MG: 5 TABLET ORAL at 08:01

## 2022-06-01 NOTE — P.PN
Subjective


Progress Note Date: 05/29/22


Principal diagnosis: 


Klebsiella urinary tract infection





Patient is a 81-year-old  female with a past medical history 

significant for pancreatic cancer with recent stent placement at [facility], presented to hospital generalized weakness did have elevated white c

ount positive and concern for a symptomatic UTI.  Had been finalized Klebsiella


 on today's evaluation that is 05/29/2022, the patient remains to be afebrile, 

the patient is breathing comfortably on room air , the patient denies chest pain

shortness of breath or cough no abdominal pain no diarrhea, no new symptoms








Objective





- Vital Signs


Vital signs: 


                                   Vital Signs











Temp  98.6 F   05/29/22 13:52


 


Pulse  82   05/29/22 13:52


 


Resp  18   05/29/22 13:52


 


BP  159/73   05/29/22 13:52


 


Pulse Ox  98   05/29/22 13:52


 


FiO2      








                                 Intake & Output











 05/28/22 05/29/22 05/29/22





 18:59 06:59 18:59


 


Output Total 600 900 


 


Balance -600 -900 


 


Output:   


 


  Urine 600 900 


 


Other:   


 


  Voiding Method  External Catheter 


 


  # Voids 3  














- Exam


GENERAL DESCRIPTION: An elderly female lying in bed in no distress





RESPIRATORY SYSTEM: Unlabored breathing , decreased breath sounds at bases





HEART: S1 S2 regular rate and rhythm ,





ABDOMEN: Soft , no tenderness





EXTREMITIES: No edema feet








- Labs


CBC & Chem 7: 


                                 05/30/22 10:57





                                 05/30/22 10:57


Labs: 


                  Abnormal Lab Results - Last 24 Hours (Table)











  05/29/22 05/29/22 Range/Units





  05:25 05:25 


 


WBC  10.20 H   (4.50-10.00)  X 10*3/uL


 


RBC  2.65 L   (4.10-5.20)  X 10*6/uL


 


Hgb  7.8 L   (12.0-15.0)  g/dL


 


Hct  25.6 L   (37.2-46.3)  %


 


MCHC  30.5 L   (32.0-37.0)  g/dL


 


RDW  16.4 H   (11.5-14.5)  %


 


Monocytes # (Manual)  0.10 L   (0.20-1.00)  X 10*3/uL


 


Eosinophils # (Manual)  0.41 H   (0.04-0.35)  X 10*3/uL


 


Sodium   147 H  (135-145)  mmol/L


 


Chloride   110 H  ()  mmol/L


 


BUN   30.1 H  (9.0-27.0)  mg/dL


 


Est GFR (CKD-EPI)AfAm   43.2 L  (60.0-200.0)   


 


Est GFR (CKD-EPI)NonAf   37.3 L  (60.0-200.0)   


 


BUN/Creatinine Ratio   22.30 H  (12.00-20.00)  Ratio


 


Glucose   298 H  ()  mg/dL


 


C-Reactive Protein   1.00 H  (0.00-0.80)  mg/dL














Assessment and Plan


(1) Urinary tract infection


Current Visit: Yes   Status: Acute   Code(s): N39.0 - URINARY TRACT INFECTION, 

SITE NOT SPECIFIED   SNOMED Code(s): 67105336


   


Plan: 


1patient presented to hospital with weakness decreased appetite did have an 

episode of vomiting in this patient with underlying pancreatic cancer symptoms 

etiology is multifactorial likely component of dehydration as the patient has 

significantly weighted BUN/creatinine on admission plus minus a component of 

urinary tract infection with urine culture showing Klebsiella pneumonia blood 

culture has been negative.


2patient has completed as 7 day course of Rocephin 2 g daily and is currently 

being monitored closely off antibiotics


 


 





Time with Patient: Less than 30

## 2022-06-01 NOTE — P.PN
Subjective


Progress Note Date: 05/30/22


Principal diagnosis: 


Klebsiella urinary tract infection





Patient is a 81-year-old  female with a past medical history 

significant for pancreatic cancer with recent stent placement at [facility], presented to hospital generalized weakness did have elevated white c

ount positive and concern for a symptomatic UTI.  Had been finalized Klebsiella


 on today's evaluation that is 05/30/2022, the patient is afebrile, the patient 

is breathing comfortably on room air , the patient denies chest pain shortness 

of breath or cough no abdominal pain no diarrhea,  overall feeling better





Objective





- Vital Signs


Vital signs: 


                                   Vital Signs











Temp  98.5 F   05/30/22 19:43


 


Pulse  96   05/30/22 19:43


 


Resp  18   05/30/22 19:43


 


BP  111/65   05/30/22 19:43


 


Pulse Ox  96   05/30/22 19:43


 


FiO2      








                                 Intake & Output











 05/30/22 05/30/22 05/31/22





 06:59 18:59 06:59


 


Intake Total 900 1320 


 


Output Total 900 1900 


 


Balance 0 -580 


 


Intake:   


 


  Intake, IV Titration 900  





  Amount   


 


    Dextrose 5% in Water 1, 900  





    000 ml @ 75 mls/hr IV .   





    [id number] Rx#:[id number]   


 


  Oral  1320 


 


Output:   


 


  Urine 900 1900 


 


Other:   


 


  Voiding Method External Catheter External Catheter Indwelling Catheter


 


  # Bowel Movements  1 














- Exam


GENERAL DESCRIPTION: An elderly female lying in bed in no distress





RESPIRATORY SYSTEM: Unlabored breathing , decreased breath sounds at bases





HEART: S1 S2 regular rate and rhythm ,





ABDOMEN: Soft , no tenderness





EXTREMITIES: No edema feet








- Labs


CBC & Chem 7: 


                                 05/30/22 10:57





                                 05/30/22 10:57


Labs: 


                  Abnormal Lab Results - Last 24 Hours (Table)











  05/30/22 05/30/22 05/30/22 Range/Units





  10:57 10:57 16:42 


 


RBC  2.70 L    (3.80-5.40)  m/uL


 


Hgb  8.2 L    (11.4-16.0)  gm/dL


 


Hct  26.3 L    (34.0-46.0)  %


 


RDW  16.5 H    (11.5-15.5)  %


 


Sodium   134 L   (137-145)  mmol/L


 


BUN   37 H   (7-17)  mg/dL


 


Creatinine   1.23 H   (0.52-1.04)  mg/dL


 


Glucose   445 H   (74-99)  mg/dL


 


POC Glucose (mg/dL)    446 H  (75-99)  mg/dL














Assessment and Plan


(1) Urinary tract infection


Current Visit: Yes   Status: Acute   Code(s): N39.0 - URINARY TRACT INFECTION, 

SITE NOT SPECIFIED   SNOMED Code(s): 86630095


   


Plan: 


1patient presented to hospital with weakness decreased appetite did have an 

episode of vomiting in this patient with underlying pancreatic cancer symptoms 

etiology is multifactorial likely component of dehydration as the patient has 

significantly weighted BUN/creatinine on admission plus minus a component of 

urinary tract infection with urine culture showing Klebsiella pneumonia blood 

culture has been negative.


2patient urinary tract infection has been adequately treated , and is doing 

well off antibiotics


 





Time with Patient: Less than 30

## 2022-06-01 NOTE — P.PN
Subjective


Progress Note Date: 06/01/22


Principal diagnosis: 


UTI





Patient is a 81-year-old female with a known history of hypertension, 

hyperlipidemia, COPD, dementia and previous history of smoking and recent ERCP 

and stent placement and fine-needle aspiration biopsy of the pancreatic mass 

suspected cancer at [facility] was brought to hospital by her  

due to generalized weakness and tiredness.  Patient has not been eating or 

drinking since morning and has episode of vomiting.  She was also slightly more 

confused than normal.  And has been less expressive and responsive.  No fever no

chills.  No chest pain or shortness of breath.  No diarrhea.  No cough or sputum

production.  Patient has been evaluated at home.  Patient does complain of 

occasional abdominal pain as per her .





Patient was discharged from [facility] about a week ago where he had 

biliary stent placement and fine-needle aspiration biopsy was done. She was 

diagnosis with pancreatic cancer and was scheduled to see Dr. Fox in office on 

June 8th, however is now admitted. She was found to have possible UTI, treated. 

She is receiving IV Fluids and has become more awake per . The details of

her cancer are not known as the records from [facility] have not yet been sent.





5/24  The patient is resting in bed and appears comfortable. Her  states 

her mentation is clearing someone and she is back to her baseline which is A&O x

1.  He also states that her appetite is improving.  Awaiting records from [facility] for further recs from oncology on new diagnosis of pancreatic cancer. 

Patient's  would like to speak to Dr. Fox before trying to establish 

goals of care.  Will follow up with patient and her  tomorrow. 





5/25 The patient is sleeping in bed sleeping.  Her  states that her 

appetite is improving.  Awaiting records from [facility] for further recs from 

oncology on new diagnosis of pancreatic cancer. Patient's  would like to 

speak to Dr. Fox before trying to establish goals of care.  Will follow up with 

patient and her  tomorrow. 





5/26 The patient is sleeping in bed and appears comfortable.  Awaiting records 

from [facility] for further recs from oncology on new diagnosis of pancreatic 

cancer. Patient's  would like to speak to Dr. Fox before trying to 

establish goals of care. Continue with antibiotic treatment for UTI. Hgb 6.9 

will receive 1 unit RBC's. Will follow up with patient and her  tomorrow.







5/27 The patient is resting in bed, in pleasantly confused, and appears 

comfortable.  Abd CT shows significant fecal load to rectum and colon.  Dulcolax

suppository ordered.  Awaiting records from [facility] for further recs from 

oncology on new diagnosis of pancreatic cancer. Patient's  would like to 

speak to Dr. Fox before trying to establish goals of care. Continue with 

antibiotic treatment for UTI. Hgb 9.8 today.





5/31  The patient is watching television in bed. She states she ate a good 

breakfast and denies any pain.  Her  is not at the bedside today.  Per 

oncology -review of CT reveals locally advanced disease. The plan was to be 

discussed with the patient's over the weekend - to see Dr. Mcgregor after discharge 

for potential surgical intervention versus options of samantha-adjuvant treatment.





The patient's  is here now feeding her lunch.  He seems confused as to 

what treatment options are available, if any, for her pancreatic cancer and 

needs clarification from the oncology team.  His main focus is getting her home.

 











Objective





- Vital Signs


Vital signs: 


                                   Vital Signs











Temp  98.4 F   06/01/22 07:45


 


Pulse  84   06/01/22 07:45


 


Resp  16   06/01/22 07:45


 


BP  153/76   06/01/22 07:45


 


Pulse Ox  99   06/01/22 07:45


 


FiO2      








                                 Intake & Output











 05/31/22 06/01/22 06/01/22





 18:59 06:59 18:59


 


Intake Total 1080  


 


Output Total  1850 


 


Balance 1080 -1850 


 


Weight 56.699 kg  


 


Intake:   


 


  Oral 1080  


 


Output:   


 


  Urine  1850 


 


Other:   


 


  Voiding Method External Catheter  External Catheter


 


  # Bowel Movements 0  














- Exam


General: Patient awake alert. Oriented x 1, to person.  No acute distress.


HEENT: Head is atraumatic, normocephalic Neck is supple. Sclerae are clear. 

Pupils equal, round and reactive to light bilaterally.  


CV: Heart regular in rate and rhythm positive S1 and S2.  No S3. No S4.  No 

clicks, rubs or murmurs. No JVD. Peripheral pulses equal. 2/4


Lungs: Clear to auscultation bilaterally.  No wheezes rales or rhonchi. 

Respirations even and nonlabored. No intercostal retractions.


Abdomen/GI: Soft. Bowel sounds present in all 4 quadrants. Bowel sounds 

normoactive. No abdominal tenderness.  


Musculoskeletal/ Extremities: No tenderness on muscular exam.  No ecchymosis.


Vascular: Radial pulses equal. 2/4.


Skin: No rash. 


Neurologic: Awake, alert and oriented x 1. 


Psychiatric: Appropriate mood, flat affect.








- Labs


CBC & Chem 7: 


                                 05/30/22 10:57





                                 05/30/22 10:57


Labs: 


                  Abnormal Lab Results - Last 24 Hours (Table)











  05/31/22 05/31/22 05/31/22 Range/Units





  11:07 16:24 20:38 


 


POC Glucose (mg/dL)  288 H  119 H  211 H  (75-99)  mg/dL














  06/01/22 Range/Units





  07:17 


 


POC Glucose (mg/dL)  178 H  (75-99)  mg/dL














Assessment and Plan


Assessment: 


Symptoms


* Pain - 0/10


* Fatigue - yes


* weakness - yes


* SOB - no


* Insomnia - no


* N/V - yes


* Anxiety - no


* Depression - no


* Confusion - yes, continue namenda


* Agitation - no


* Hallucinations - no


* Appetite/weight loss - decreased appetite since biliary stent placement, 5-7lb

  weight loss in past 3 weeks, appetite improving


* Dysphagia - no


* Constipation - no, continue colace prn and mom. LBM 5/31


* Incontinence - yes, wears briefs


* Itch - no





Plan: 


Summary/Goals - The patient is resting in bed, smiling, and watching television.

 The  is not at the bedside at the time of examination. The plan is to 

discharge the patient home with supportive services. Then to follow up with the 

surgeon from [facility] regarding test results.  She will then follow up with 

oncology as outpatient accordingly. 





Recommendation - To be discharged home with home health aid, visiting RN, PT/OT,

and palliative care. Follow up with surgeon and oncology.





Advanced Directives - information given





Code Status - DNR





Thank you for this consult





Nicci Cowan [facility]


Palliative Care


[facility] [id number]


Email: [email or web address]








Time with Patient: Less than 30

## 2022-06-01 NOTE — P.PN
Subjective


Progress Note Date: 05/31/22


Principal diagnosis: 


Klebsiella urinary tract infection





Patient is a 81-year-old  female with a past medical history 

significant for pancreatic cancer with recent stent placement at [facility], presented to hospital generalized weakness did have elevated white c

ount positive and concern for a symptomatic UTI.  Had been finalized Klebsiella


 on today's evaluation that is 05/31/2022, the patient denies any fever or any 

chills, the patient is breathing comfortably on room air , the patient denies 

chest pain shortness of breath or cough , the patient denies abdominal pain no 

diarrhea,  no new symptoms





Objective





- Vital Signs


Vital signs: 


                                   Vital Signs











Temp  97.8 F   05/31/22 08:02


 


Pulse  82   05/31/22 08:02


 


Resp  16   05/31/22 08:15


 


BP  147/70   05/31/22 08:02


 


Pulse Ox  97   05/31/22 08:02


 


FiO2      








                                 Intake & Output











 05/30/22 05/31/22 05/31/22





 18:59 06:59 18:59


 


Intake Total 1320  


 


Output Total 1900 550 


 


Balance -580 -550 


 


Weight   56.699 kg


 


Intake:   


 


  Oral 1320  


 


Output:   


 


  Urine 1900 550 


 


Other:   


 


  Voiding Method External Catheter Indwelling Catheter Indwelling Catheter


 


  # Voids  3 


 


  # Bowel Movements 1 1 














- Exam


GENERAL DESCRIPTION: An elderly female lying in bed in no distress





RESPIRATORY SYSTEM: Unlabored breathing , decreased breath sounds at bases





HEART: S1 S2 regular rate and rhythm ,





ABDOMEN: Soft , no tenderness





EXTREMITIES: No edema feet








- Labs


CBC & Chem 7: 


                                 05/30/22 10:57





                                 05/30/22 10:57


Labs: 


                  Abnormal Lab Results - Last 24 Hours (Table)











  05/30/22 05/31/22 05/31/22 Range/Units





  16:42 09:41 11:07 


 


POC Glucose (mg/dL)  446 H  338 H  288 H  (75-99)  mg/dL














Assessment and Plan


(1) Urinary tract infection


Current Visit: Yes   Status: Acute   Code(s): N39.0 - URINARY TRACT INFECTION, 

SITE NOT SPECIFIED   SNOMED Code(s): 50448354


   


Plan: 


1patient presented to hospital with weakness decreased appetite did have an 

episode of vomiting in this patient with underlying pancreatic cancer symptoms 

etiology is multifactorial likely component of dehydration as the patient has 

significantly weighted BUN/creatinine on admission plus minus a component of 

urinary tract infection with urine culture showing Klebsiella pneumonia blood 

culture has been negative.


2patient urinary tract infection has been adequately treated , and is currently

doing well off antibiotics, will continue to monitor closely off antibiotic


 





Time with Patient: Less than 30

## 2022-06-03 NOTE — P.DS
Providers


Date of admission: 


05/20/22 19:08





Expected date of discharge: 06/01/22


Attending physician: 


Maximilian Huggins





Consults: 





                                        





05/21/22 22:11


Consult Physician Routine 


   Consulting Provider: Mathew Fox


   Consult Reason/Comments: Pancreatic CA


   Do you want consulting provider notified?: Yes, Notify in am





05/23/22 10:02


Consult to Palliative Care Routine 


   Consulting Provider: Nicci Cowan


   Consult Reason/Comments: pancreatic ca


   Do you want consulting provider notified?: Yes





05/25/22 12:18


Consult Physician Routine 


   Consulting Provider: Molly Steele


   Consult Reason/Comments: sepsis


   Do you want consulting provider notified?: Yes





05/31/22 12:54


Consult to Palliative Care Stat 


   Consulting Provider: Nicci Cowan


   Consult Reason/Comments: This is for outpatient palliative care through 

[facility]


   Do you want consulting provider notified?: Yes











Primary care physician: 


Maximilian Huggins





Hospital Course: 











Final diagnosis





Acute urinary tract infection with klebsiella pneumonia and sepsis, present on 

admission, ESBL in the urine


right heel, unstageable ulcer, present on admission


altered mental status possible metabolic encephalopathy secondary to acute UTI, 

present on admission


Hypernatremia, improved


elevated blood glucose, most likely due to dextrose in water IV solution to 

correct the hypernatremia


Acute kidney injury


Hyperbilirubinemia


Recent ERCP and biliary stent placement at [facility] for pancreatic cancer


COPD


GI prophylaxis


DVT prophylaxis


no code





Discharge disposition


Patient is being discharged in a stable condition with guarded prognosis to 

home.  She will continue with palliative care in the outpatient setting.  

Patient will follow-up with Dr. Huggins in the outpatient setting upon discharge. 

Patient is to follow-up with surgeon in regards to pancreatic cancer with 

oncology.  Patient will continue to monitor blood sugars before meals and at 

bedtime and a free glucometer was provided and also prescription for further 

testing supplies and encourage the patient and  to follow-up with primary

care provider this week.  Total time taken is greater than 35 minutes.





Hospital course








This is a 81 year-old female who was recently admitted with UTI and was found to

have Klebsiella pneumonia with ESBL and was followed closely with infectious 

disease.  Patient also had received 1 unit of PRBC for low hemoglobin and has 

recently undergone ERCP and biliary stent placement at [facility] and will 

follow-up with oncology outpatient.  Patient and  will be having 

palliative care on discharge.  Patient was treated adequately with antibiotics 

and doing well off antibiotics and will not require antibiotics and discharge.  

Patient also had an episode of hypernatremia and was treated with D5 in water 

causing elevated blood sugars as patient does not have history of diabetes.  

Blood sugars improved although continue to be mildly elevated and discussed with

the patient  at the bedside about monitoring blood sugars and keeping a 

diary with Accu-Cheks before meals and at bedtime and following up with primary 

care provider Dr. Huggins this week.  Currently no reports of chest pain, 

shortness of breath, or palpitations.  Patient is afebrile.  No reports of 

nausea or vomiting and patient is tolerating diet.  Patient will be discharged 

home today.  Guarded prognosis.





On exam vital signs are stable.   Cardio S1, S2 are muffled.  Respiratory system

shows diminished breath sounds at the bases with no wheezing or rhonchi noted.  

Abdomen is soft and  nontender.  Nervous system shows no focal deficits 





Please refer to medication reconciliation sheet for a list of medications.





The impression and plan of care has been dictated by Rebecca Patterson, Nurse 

Practitioner as directed.





Dr. Fermín MD


I have performed a history and examination and MDM of this patient, discussed 

the same with the dictator, and  agree with the dictator's assessment and plan 

as written ,documented as a scribe. Based on total visit time,  I have performed

more than 50% of the visit.  


Patient Condition at Discharge: Fair





Plan - Discharge Summary


Discharge Rx Participant: Yes


New Discharge Prescriptions: 


Continue


   Clopidogrel [Plavix] 75 mg PO DAILY


   Aspirin EC [Ecotrin Low Dose] 81 mg PO DAILY


   Memantine HCl 10 mg PO BID


   Simvastatin 40 mg PO HS


   Magnesium Hydroxide [Milk of Magnesia] 2,400 mg PO HS


   Furosemide [Lasix] 40 mg PO MOTUWETHFR


   Ginkgo Biloba 1200mg 1 tab PO DAILY


   Ferrous Sulfate [Iron (65 MG Elemental)] 325 mg PO DAILY


   Cholecalciferol [Vitamin D3 (25 Mcg = 1000 Iu)] 25 mcg PO DAILY


   buPROPion HCL [Wellbutrin XL] 150 mg PO DAILY


   amLODIPine [Norvasc] 5 mg PO DAILY


   Escitalopram [Lexapro] 5 mg PO DAILY


Discharge Medication List





Clopidogrel [Plavix] 75 mg PO DAILY 02/03/15 [History]


Aspirin EC [Ecotrin Low Dose] 81 mg PO DAILY 01/22/21 [History]


Memantine HCl 10 mg PO BID 01/22/21 [History]


Cholecalciferol [Vitamin D3 (25 Mcg = 1000 Iu)] 25 mcg PO DAILY 05/05/22 

[History]


Ferrous Sulfate [Iron (65 MG Elemental)] 325 mg PO DAILY 05/05/22 [History]


Ginkgo Biloba 1200mg 1 tab PO DAILY 05/05/22 [History]


Simvastatin 40 mg PO HS 05/05/22 [History]


amLODIPine [Norvasc] 5 mg PO DAILY 05/05/22 [History]


buPROPion HCL [Wellbutrin XL] 150 mg PO DAILY 05/05/22 [History]


Escitalopram [Lexapro] 5 mg PO DAILY 05/20/22 [History]


Furosemide [Lasix] 40 mg PO MOTUWETHFR 05/20/22 [History]


Magnesium Hydroxide [Milk of Magnesia] 2,400 mg PO HS 05/20/22 [History]








Follow up Appointment(s)/Referral(s): 


[facility], [NON-STAFF] -  [facility] )


Maximilian Huggins DO [Primary Care Provider] - 06/09/22 3:10 pm


()


[facility] [NON-STAFF] -  ([facility] palliative care will contact you

to arrange a visit)


Patient Instructions/Handouts:  Urinary Tract Infection in Women (DC)


Activity/Diet/Wound Care/Special Instructions: 


Activity Limited until follow-up


Follow-up with primary care provider discharge


Follow-up with oncology outpatient


Follow-up with palliative care 


Continue consistent carb heart healthy diet





Continue to monitor Accu-Cheks before meals and at bedtime and keep a diary of 

blood sugar readings for primary care follow-up





Discharge Disposition: HOME SELF-CARE

## 2022-06-10 ENCOUNTER — HOSPITAL ENCOUNTER (OUTPATIENT)
Dept: HOSPITAL 47 - RADPETMAIN | Age: 82
Discharge: HOME | End: 2022-06-10
Attending: INTERNAL MEDICINE
Payer: MEDICARE

## 2022-06-10 DIAGNOSIS — C25.0: Primary | ICD-10-CM

## 2022-06-10 PROCEDURE — 78815 PET IMAGE W/CT SKULL-THIGH: CPT

## 2022-06-14 NOTE — PE
Nuclear medicine PET/CT

 

HISTORY: C 25.0, pancreas cancer, initial

 

Patient received 12.7 mCi F-18 FDG intravenously and delayed scanning was performed from the skull ba
se to the mid thighs. A localization and attenuation correction CT scan was performed.

 

Correlation to CT scan 5/26/2022

 

Average mediastinal SUV 1.9, average liver SUV 2.5

 

Neck and chest: There is no pleural or pericardial effusion. Coronary artery calcifications are prese
nt. No cervical or supraclavicular adenopathy. Focus of uptake is noted along the posterior pleural m
argin on the right between the seventh and eighth ribs, SUV is 1.5.

 

ABDOMEN: There is a stent along the biliary tract. Along the region of the pancreatic tumor there hyp
ermetabolic uptake, SUV is 3.4. There are air-filled ducts within the liver. No evident ascites. No r
etroperitoneal adenopathy. Increased attenuation along the left lower quadrant within the subcutaneou
s fat could be due to underlying injections.

 

Osseous structures show no suspicious uptake. Degenerative disc changes and facet arthropathy noted i
n the visualized spine.

 

IMPRESSION: Findings consistent with patient's history of pancreatic cancer, indeterminate focus of u
ptake as described, possible inflammatory posterior right chest.

## 2022-08-01 ENCOUNTER — HOSPITAL ENCOUNTER (INPATIENT)
Dept: HOSPITAL 47 - EC | Age: 82
LOS: 5 days | Discharge: HOME HEALTH | DRG: 640 | End: 2022-08-06
Attending: FAMILY MEDICINE | Admitting: FAMILY MEDICINE
Payer: MEDICARE

## 2022-08-01 VITALS — BODY MASS INDEX: 21 KG/M2

## 2022-08-01 DIAGNOSIS — E11.65: ICD-10-CM

## 2022-08-01 DIAGNOSIS — E87.8: ICD-10-CM

## 2022-08-01 DIAGNOSIS — E86.0: ICD-10-CM

## 2022-08-01 DIAGNOSIS — Z79.82: ICD-10-CM

## 2022-08-01 DIAGNOSIS — E11.22: ICD-10-CM

## 2022-08-01 DIAGNOSIS — I12.9: ICD-10-CM

## 2022-08-01 DIAGNOSIS — Z79.02: ICD-10-CM

## 2022-08-01 DIAGNOSIS — E78.5: ICD-10-CM

## 2022-08-01 DIAGNOSIS — E87.0: Primary | ICD-10-CM

## 2022-08-01 DIAGNOSIS — R32: ICD-10-CM

## 2022-08-01 DIAGNOSIS — E87.1: ICD-10-CM

## 2022-08-01 DIAGNOSIS — F03.90: ICD-10-CM

## 2022-08-01 DIAGNOSIS — N18.4: ICD-10-CM

## 2022-08-01 DIAGNOSIS — Z80.9: ICD-10-CM

## 2022-08-01 DIAGNOSIS — J44.9: ICD-10-CM

## 2022-08-01 DIAGNOSIS — Z87.891: ICD-10-CM

## 2022-08-01 DIAGNOSIS — E87.6: ICD-10-CM

## 2022-08-01 DIAGNOSIS — Z51.5: ICD-10-CM

## 2022-08-01 DIAGNOSIS — L89.152: ICD-10-CM

## 2022-08-01 DIAGNOSIS — Z79.899: ICD-10-CM

## 2022-08-01 DIAGNOSIS — Z79.4: ICD-10-CM

## 2022-08-01 DIAGNOSIS — E86.1: ICD-10-CM

## 2022-08-01 DIAGNOSIS — Z71.3: ICD-10-CM

## 2022-08-01 DIAGNOSIS — C25.9: ICD-10-CM

## 2022-08-01 DIAGNOSIS — F32.A: ICD-10-CM

## 2022-08-01 DIAGNOSIS — N17.0: ICD-10-CM

## 2022-08-01 DIAGNOSIS — Z82.49: ICD-10-CM

## 2022-08-01 DIAGNOSIS — L89.621: ICD-10-CM

## 2022-08-01 DIAGNOSIS — Z66: ICD-10-CM

## 2022-08-01 DIAGNOSIS — E44.0: ICD-10-CM

## 2022-08-01 DIAGNOSIS — T50.2X5A: ICD-10-CM

## 2022-08-01 DIAGNOSIS — F41.9: ICD-10-CM

## 2022-08-01 DIAGNOSIS — L89.611: ICD-10-CM

## 2022-08-01 DIAGNOSIS — G93.41: ICD-10-CM

## 2022-08-01 LAB
ALBUMIN SERPL-MCNC: 4.7 G/DL (ref 3.5–5)
ALP SERPL-CCNC: 191 U/L (ref 38–126)
ALT SERPL-CCNC: 22 U/L (ref 4–34)
ANION GAP SERPL CALC-SCNC: 12 MMOL/L
ANION GAP SERPL CALC-SCNC: 14 MMOL/L
APTT BLD: 18.3 SEC (ref 22–30)
AST SERPL-CCNC: 27 U/L (ref 14–36)
BASOPHILS # BLD AUTO: 0.1 K/UL (ref 0–0.2)
BASOPHILS # BLD AUTO: 0.1 K/UL (ref 0–0.2)
BASOPHILS NFR BLD AUTO: 1 %
BASOPHILS NFR BLD AUTO: 1 %
BUN SERPL-SCNC: 47 MG/DL (ref 7–17)
BUN SERPL-SCNC: 48 MG/DL (ref 7–17)
CALCIUM SPEC-MCNC: 10.1 MG/DL (ref 8.4–10.2)
CALCIUM SPEC-MCNC: 10.5 MG/DL (ref 8.4–10.2)
CHLORIDE SERPL-SCNC: 109 MMOL/L (ref 98–107)
CHLORIDE SERPL-SCNC: 111 MMOL/L (ref 98–107)
CO2 SERPL-SCNC: 26 MMOL/L (ref 22–30)
CO2 SERPL-SCNC: 29 MMOL/L (ref 22–30)
EOSINOPHIL # BLD AUTO: 0 K/UL (ref 0–0.7)
EOSINOPHIL # BLD AUTO: 0.1 K/UL (ref 0–0.7)
EOSINOPHIL NFR BLD AUTO: 0 %
EOSINOPHIL NFR BLD AUTO: 1 %
ERYTHROCYTE [DISTWIDTH] IN BLOOD BY AUTOMATED COUNT: 4.1 M/UL (ref 3.8–5.4)
ERYTHROCYTE [DISTWIDTH] IN BLOOD BY AUTOMATED COUNT: 4.29 M/UL (ref 3.8–5.4)
ERYTHROCYTE [DISTWIDTH] IN BLOOD: 12.9 % (ref 11.5–15.5)
ERYTHROCYTE [DISTWIDTH] IN BLOOD: 12.9 % (ref 11.5–15.5)
GLUCOSE BLD-MCNC: 241 MG/DL (ref 70–110)
GLUCOSE BLD-MCNC: 437 MG/DL (ref 70–110)
GLUCOSE BLD-MCNC: 444 MG/DL (ref 70–110)
GLUCOSE BLD-MCNC: 455 MG/DL (ref 70–110)
GLUCOSE BLD-MCNC: 594 MG/DL (ref 70–110)
GLUCOSE SERPL-MCNC: 421 MG/DL (ref 74–99)
GLUCOSE SERPL-MCNC: 619 MG/DL (ref 74–99)
GLUCOSE UR QL: (no result)
HCT VFR BLD AUTO: 38.5 % (ref 34–46)
HCT VFR BLD AUTO: 41 % (ref 34–46)
HGB BLD-MCNC: 12 GM/DL (ref 11.4–16)
HGB BLD-MCNC: 12.5 GM/DL (ref 11.4–16)
INR PPP: 1.1 (ref ?–1.2)
LYMPHOCYTES # SPEC AUTO: 0.7 K/UL (ref 1–4.8)
LYMPHOCYTES # SPEC AUTO: 0.9 K/UL (ref 1–4.8)
LYMPHOCYTES NFR SPEC AUTO: 10 %
LYMPHOCYTES NFR SPEC AUTO: 7 %
MAGNESIUM SPEC-SCNC: 3.3 MG/DL (ref 1.6–2.3)
MCH RBC QN AUTO: 29.1 PG (ref 25–35)
MCH RBC QN AUTO: 29.3 PG (ref 25–35)
MCHC RBC AUTO-ENTMCNC: 30.5 G/DL (ref 31–37)
MCHC RBC AUTO-ENTMCNC: 31.1 G/DL (ref 31–37)
MCV RBC AUTO: 94.1 FL (ref 80–100)
MCV RBC AUTO: 95.6 FL (ref 80–100)
MONOCYTES # BLD AUTO: 0.2 K/UL (ref 0–1)
MONOCYTES # BLD AUTO: 0.3 K/UL (ref 0–1)
MONOCYTES NFR BLD AUTO: 3 %
MONOCYTES NFR BLD AUTO: 3 %
NEUTROPHILS # BLD AUTO: 7.4 K/UL (ref 1.3–7.7)
NEUTROPHILS # BLD AUTO: 8.7 K/UL (ref 1.3–7.7)
NEUTROPHILS NFR BLD AUTO: 85 %
NEUTROPHILS NFR BLD AUTO: 88 %
PH UR: 6.5 [PH] (ref 5–8)
PLATELET # BLD AUTO: 239 K/UL (ref 150–450)
PLATELET # BLD AUTO: 258 K/UL (ref 150–450)
POTASSIUM SERPL-SCNC: 3.5 MMOL/L (ref 3.5–5.1)
POTASSIUM SERPL-SCNC: 4.5 MMOL/L (ref 3.5–5.1)
PROT SERPL-MCNC: 7.9 G/DL (ref 6.3–8.2)
PT BLD: 12 SEC (ref 9–12)
SODIUM SERPL-SCNC: 149 MMOL/L (ref 137–145)
SODIUM SERPL-SCNC: 152 MMOL/L (ref 137–145)
SP GR UR: 1.01 (ref 1–1.03)
UROBILINOGEN UR QL STRIP: <2 MG/DL (ref ?–2)
WBC # BLD AUTO: 8.6 K/UL (ref 3.8–10.6)
WBC # BLD AUTO: 9.9 K/UL (ref 3.8–10.6)

## 2022-08-01 PROCEDURE — 71046 X-RAY EXAM CHEST 2 VIEWS: CPT

## 2022-08-01 PROCEDURE — 81003 URINALYSIS AUTO W/O SCOPE: CPT

## 2022-08-01 PROCEDURE — 94760 N-INVAS EAR/PLS OXIMETRY 1: CPT

## 2022-08-01 PROCEDURE — 85025 COMPLETE CBC W/AUTO DIFF WBC: CPT

## 2022-08-01 PROCEDURE — 96360 HYDRATION IV INFUSION INIT: CPT

## 2022-08-01 PROCEDURE — 83735 ASSAY OF MAGNESIUM: CPT

## 2022-08-01 PROCEDURE — 84295 ASSAY OF SERUM SODIUM: CPT

## 2022-08-01 PROCEDURE — 76770 US EXAM ABDO BACK WALL COMP: CPT

## 2022-08-01 PROCEDURE — 99285 EMERGENCY DEPT VISIT HI MDM: CPT

## 2022-08-01 PROCEDURE — 36415 COLL VENOUS BLD VENIPUNCTURE: CPT

## 2022-08-01 PROCEDURE — 85610 PROTHROMBIN TIME: CPT

## 2022-08-01 PROCEDURE — 83605 ASSAY OF LACTIC ACID: CPT

## 2022-08-01 PROCEDURE — 80048 BASIC METABOLIC PNL TOTAL CA: CPT

## 2022-08-01 PROCEDURE — 93005 ELECTROCARDIOGRAM TRACING: CPT

## 2022-08-01 PROCEDURE — 80053 COMPREHEN METABOLIC PANEL: CPT

## 2022-08-01 PROCEDURE — 83036 HEMOGLOBIN GLYCOSYLATED A1C: CPT

## 2022-08-01 PROCEDURE — 85730 THROMBOPLASTIN TIME PARTIAL: CPT

## 2022-08-01 RX ADMIN — MEMANTINE HYDROCHLORIDE SCH MG: 10 TABLET ORAL at 21:44

## 2022-08-01 RX ADMIN — INSULIN ASPART SCH UNIT: 100 INJECTION, SOLUTION INTRAVENOUS; SUBCUTANEOUS at 21:44

## 2022-08-01 RX ADMIN — CEFAZOLIN SCH MLS/HR: 330 INJECTION, POWDER, FOR SOLUTION INTRAMUSCULAR; INTRAVENOUS at 13:03

## 2022-08-01 RX ADMIN — INSULIN ASPART SCH UNIT: 100 INJECTION, SOLUTION INTRAVENOUS; SUBCUTANEOUS at 18:17

## 2022-08-01 NOTE — XR
EXAMINATION TYPE: XR chest 2V

 

DATE OF EXAM: 8/1/2022

 

COMPARISON: CT chest May 26, 2022

 

HISTORY:   Weakness.  History of pancreatic cancer.

 

TECHNIQUE:  Frontal and lateral views of the chest are obtained.

 

FINDINGS:  There is some mild chronic parenchymal change bilaterally without suspicious new focal air
 space opacity, pleural effusion, or pneumothorax seen.  Symmetric near 1.0 cm cm densities square in
 shape project over the bilateral upper lungs is not clearly identified areas nodules on recent CT ar
e presumed external to patient. Consider repeat study without clothing to confirm. The cardiac silhou
ette size is stable and within normal limits with atherosclerotic change aortic knob.   Overlying EKG
 leads are present. The osseous structures are demineralized.

 

IMPRESSION:  As above.

## 2022-08-01 NOTE — ED
General Adult HPI





- General


Chief complaint: Weakness


Stated complaint: dehydration


Time Seen by Provider: 08/01/22 11:03


Source: patient, family, RN notes reviewed, old records reviewed


Mode of arrival: wheelchair


Limitations: no limitations





- History of Present Illness


Initial comments: 





82 yo female presenting for evaluation of confusion and generalized weakness and

concern for dehydration.  Patient has dementia as well as recurrent pancreatic 

cancer.  She is brought in by her  who is able to give history.  There's 

been no fever.  No vomiting.  No cough or dyspnea.   is uncertain if the 

patient has been experiencing any pain.





- Related Data


                                Home Medications











 Medication  Instructions  Recorded  Confirmed


 


Clopidogrel [Plavix] 75 mg PO DAILY 02/03/15 08/01/22


 


Aspirin EC [Ecotrin Low Dose] 81 mg PO DAILY 01/22/21 08/01/22


 


Memantine HCl 10 mg PO BID 01/22/21 08/01/22


 


Ferrous Sulfate [Iron (65  mg PO DAILY 05/05/22 08/01/22





Elemental)]   


 


Ginkgo Biloba 1200mg 1 tab PO DAILY 05/05/22 08/01/22


 


Simvastatin 40 mg PO HS 05/05/22 08/01/22


 


amLODIPine [Norvasc] 5 mg PO DAILY 05/05/22 08/01/22


 


buPROPion HCL [Wellbutrin XL] 150 mg PO DAILY 05/05/22 08/01/22


 


Escitalopram [Lexapro] 5 mg PO DAILY 05/20/22 08/01/22


 


Furosemide [Lasix] 40 mg PO DAILY 05/20/22 08/01/22











                                    Allergies











Allergy/AdvReac Type Severity Reaction Status Date / Time


 


No Known Allergies Allergy   Verified 08/01/22 11:56














Review of Systems


ROS Statement: 


Those systems with pertinent positive or pertinent negative responses have been 

documented in the HPI.





ROS Other: All systems not noted in ROS Statement are negative.





Past Medical History


Past Medical History: Cancer, COPD, Dementia, Hyperlipidemia, Hypertension, 

Memory Impairment, Renal Disease


Additional Past Medical History / Comment(s): HIATAL HERNIA, pancreatic CA


History of Any Multi-Drug Resistant Organisms: None Reported


Past Surgical History: Bowel Resection


Additional Past Surgical History / Comment(s): LT CAROTID ENDARTERECTOMY.  

COLONOSCOPY.  BILAT CATARACTS REMOVED.  D & C.  EGD


Past Anesthesia/Blood Transfusion Reactions: No Reported Reaction


Past Psychological History: No Psychological Hx Reported


Smoking Status: Former smoker


Past Alcohol Use History: Rare


Past Drug Use History: None Reported





- Past Family History


  ** Father


Family Medical History: Cancer





General Exam


Limitations: no limitations


General appearance: in no apparent distress, lethargic


Head exam: Present: atraumatic, normocephalic


Eye exam: Present: normal appearance, PERRL


ENT exam: Present: mucous membranes dry


Neck exam: Present: normal inspection.  Absent: tenderness, meningismus


Respiratory exam: Present: normal lung sounds bilaterally.  Absent: respiratory 

distress, wheezes


Cardiovascular Exam: Present: regular rate, normal rhythm


GI/Abdominal exam: Present: soft.  Absent: distended, tenderness, guarding


Extremities exam: Present: normal inspection, normal capillary refill.  Absent: 

pedal edema


Neurological exam: Present: alert.  Absent: motor sensory deficit


Skin exam: Present: warm, dry, intact





Course


                                   Vital Signs











  08/01/22 08/01/22





  10:58 12:38


 


Temperature 97.6 F 


 


Pulse Rate 97 91


 


Respiratory 18 16





Rate  


 


Blood Pressure 100/69 120/97


 


O2 Sat by Pulse 96 94 L





Oximetry  














EKG Findings





- EKG Comments:


EKG Findings:: Sinus rhythm, rate 91, SC interval 174, QRS duration 90,  

no ST segment elevation.





Medical Decision Making





- Medical Decision Making





81-year-old female history of dementia and pancreatic cancer presenting with 

concerns for dehydration and confusion.  Patient is unable to contribute to the 

history.  She appears comfortable with stable vitals.  She does have a normal 

CBC with improved hemoglobin from prior.  She is hypernatremic with a sodium of 

149.  She has acute kidney injury with elevated BUN and creatinine.  Her blood 

sugar is 620 and there is no prior history of diabetes according to her .

  She has a lactic acid 2.7.  Her CO2 is normal no signs of acidosis or DKA.  

Urinalysis is pending.  Chest x-ray is clear without focal pneumonia or acute 

findings.  Patient will require IV fluids she's given an initial dose of IV 

insulin in the emergency department.  Repeat laboratory studies will be obtained

 at 6 PM tonight and again at 6 AM tomorrow morning.  Primary care physician Dr. Huggins has been paged regarding admission.





- Lab Data


Result diagrams: 


                                 08/01/22 12:00





                                 08/01/22 12:00


                                   Lab Results











  08/01/22 08/01/22 08/01/22 Range/Units





  12:00 12:00 12:00 


 


WBC  8.6    (3.8-10.6)  k/uL


 


RBC  4.29    (3.80-5.40)  m/uL


 


Hgb  12.5  D    (11.4-16.0)  gm/dL


 


Hct  41.0    (34.0-46.0)  %


 


MCV  95.6    (80.0-100.0)  fL


 


MCH  29.1    (25.0-35.0)  pg


 


MCHC  30.5 L    (31.0-37.0)  g/dL


 


RDW  12.9    (11.5-15.5)  %


 


Plt Count  258    (150-450)  k/uL


 


MPV  9.4    


 


Neutrophils %  85    %


 


Lymphocytes %  10    %


 


Monocytes %  3    %


 


Eosinophils %  1    %


 


Basophils %  1    %


 


Neutrophils #  7.4    (1.3-7.7)  k/uL


 


Lymphocytes #  0.9 L    (1.0-4.8)  k/uL


 


Monocytes #  0.2    (0-1.0)  k/uL


 


Eosinophils #  0.1    (0-0.7)  k/uL


 


Basophils #  0.1    (0-0.2)  k/uL


 


Hypochromasia  Slight    


 


PT   12.0   (9.0-12.0)  sec


 


INR   1.1   (<1.2)  


 


APTT   18.3 L   (22.0-30.0)  sec


 


Sodium    149 H  (137-145)  mmol/L


 


Potassium    4.5  (3.5-5.1)  mmol/L


 


Chloride    109 H  ()  mmol/L


 


Carbon Dioxide    26  (22-30)  mmol/L


 


Anion Gap    14  mmol/L


 


BUN    48 H  (7-17)  mg/dL


 


Creatinine    1.89 H  (0.52-1.04)  mg/dL


 


Est GFR (CKD-EPI)AfAm    29  (>60 ml/min/1.73 sqM)  


 


Est GFR (CKD-EPI)NonAf    25  (>60 ml/min/1.73 sqM)  


 


Glucose    619 H*  (74-99)  mg/dL


 


POC Glucose (mg/dL)     ()  mg/dL


 


POC Glu Operater ID     


 


Plasma Lactic Acid George     (0.7-2.0)  mmol/L


 


Calcium    10.5 H  (8.4-10.2)  mg/dL


 


Magnesium    3.3 H  (1.6-2.3)  mg/dL


 


Total Bilirubin    0.9  (0.2-1.3)  mg/dL


 


AST    27  (14-36)  U/L


 


ALT    22  (4-34)  U/L


 


Alkaline Phosphatase    191 H  ()  U/L


 


Total Protein    7.9  (6.3-8.2)  g/dL


 


Albumin    4.7  (3.5-5.0)  g/dL














  08/01/22 08/01/22 Range/Units





  12:05 13:26 


 


WBC    (3.8-10.6)  k/uL


 


RBC    (3.80-5.40)  m/uL


 


Hgb    (11.4-16.0)  gm/dL


 


Hct    (34.0-46.0)  %


 


MCV    (80.0-100.0)  fL


 


MCH    (25.0-35.0)  pg


 


MCHC    (31.0-37.0)  g/dL


 


RDW    (11.5-15.5)  %


 


Plt Count    (150-450)  k/uL


 


MPV    


 


Neutrophils %    %


 


Lymphocytes %    %


 


Monocytes %    %


 


Eosinophils %    %


 


Basophils %    %


 


Neutrophils #    (1.3-7.7)  k/uL


 


Lymphocytes #    (1.0-4.8)  k/uL


 


Monocytes #    (0-1.0)  k/uL


 


Eosinophils #    (0-0.7)  k/uL


 


Basophils #    (0-0.2)  k/uL


 


Hypochromasia    


 


PT    (9.0-12.0)  sec


 


INR    (<1.2)  


 


APTT    (22.0-30.0)  sec


 


Sodium    (137-145)  mmol/L


 


Potassium    (3.5-5.1)  mmol/L


 


Chloride    ()  mmol/L


 


Carbon Dioxide    (22-30)  mmol/L


 


Anion Gap    mmol/L


 


BUN    (7-17)  mg/dL


 


Creatinine    (0.52-1.04)  mg/dL


 


Est GFR (CKD-EPI)AfAm    (>60 ml/min/1.73 sqM)  


 


Est GFR (CKD-EPI)NonAf    (>60 ml/min/1.73 sqM)  


 


Glucose    (74-99)  mg/dL


 


POC Glucose (mg/dL)   594 H  ()  mg/dL


 


POC Glu Operater ID   Mica Weiss  


 


Plasma Lactic Acid George  2.7 H*   (0.7-2.0)  mmol/L


 


Calcium    (8.4-10.2)  mg/dL


 


Magnesium    (1.6-2.3)  mg/dL


 


Total Bilirubin    (0.2-1.3)  mg/dL


 


AST    (14-36)  U/L


 


ALT    (4-34)  U/L


 


Alkaline Phosphatase    ()  U/L


 


Total Protein    (6.3-8.2)  g/dL


 


Albumin    (3.5-5.0)  g/dL














Disposition


Clinical Impression: 


 Dehydration, Hypernatremia, TIMUR (acute kidney injury), Hyperglycemia





Disposition: ADMITTED AS IP TO THIS HOSP


Condition: Stable


Is patient prescribed a controlled substance at d/c from ED?: No


Referrals: 


Maximilian Huggins DO [Primary Care Provider] - 1-2 days


Time of Disposition: 13:51

## 2022-08-02 LAB
ANION GAP SERPL CALC-SCNC: 13.5 MMOL/L (ref 10–18)
BASOPHILS # BLD AUTO: 0.15 X 10*3/UL (ref 0–0.1)
BASOPHILS NFR BLD AUTO: 1.1 %
BUN SERPL-SCNC: 43.4 MG/DL (ref 9–27)
BUN/CREAT SERPL: 21.7 RATIO (ref 12–20)
CALCIUM SPEC-MCNC: 10.1 MG/DL (ref 8.7–10.3)
CHLORIDE SERPL-SCNC: 119 MMOL/L (ref 96–109)
CO2 SERPL-SCNC: 27.5 MMOL/L (ref 20–27.5)
EOSINOPHIL # BLD AUTO: 0.02 X 10*3/UL (ref 0.04–0.35)
EOSINOPHIL NFR BLD AUTO: 0.1 %
ERYTHROCYTE [DISTWIDTH] IN BLOOD BY AUTOMATED COUNT: 3.65 X 10*6/UL (ref 4.1–5.2)
ERYTHROCYTE [DISTWIDTH] IN BLOOD: 12.9 % (ref 11.5–14.5)
GLUCOSE BLD-MCNC: 171 MG/DL (ref 70–110)
GLUCOSE BLD-MCNC: 197 MG/DL (ref 70–110)
GLUCOSE BLD-MCNC: 243 MG/DL (ref 70–110)
GLUCOSE BLD-MCNC: 257 MG/DL (ref 70–110)
GLUCOSE BLD-MCNC: 278 MG/DL (ref 70–110)
GLUCOSE BLD-MCNC: 339 MG/DL (ref 70–110)
GLUCOSE BLD-MCNC: 408 MG/DL (ref 70–110)
GLUCOSE BLD-MCNC: 432 MG/DL (ref 70–110)
GLUCOSE BLD-MCNC: 51 MG/DL (ref 70–110)
GLUCOSE BLD-MCNC: 59 MG/DL (ref 70–110)
GLUCOSE SERPL-MCNC: 105 MG/DL (ref 70–110)
HCT VFR BLD AUTO: 33.8 % (ref 37.2–46.3)
HGB BLD-MCNC: 10.6 G/DL (ref 12–15)
IMM GRANULOCYTES BLD QL AUTO: 1.4 %
LYMPHOCYTES # SPEC AUTO: 1.71 X 10*3/UL (ref 0.9–5)
LYMPHOCYTES NFR SPEC AUTO: 12.7 %
MCH RBC QN AUTO: 29 PG (ref 27–32)
MCHC RBC AUTO-ENTMCNC: 31.4 G/DL (ref 32–37)
MCV RBC AUTO: 92.6 FL (ref 80–97)
MONOCYTES # BLD AUTO: 1.05 X 10*3/UL (ref 0.2–1)
MONOCYTES NFR BLD AUTO: 7.8 %
NEUTROPHILS # BLD AUTO: 10.39 X 10*3/UL (ref 1.8–7.7)
NEUTROPHILS NFR BLD AUTO: 76.9 %
NRBC BLD AUTO-RTO: 0 /100 WBCS (ref 0–0)
PLATELET # BLD AUTO: 223 X 10*3/UL (ref 140–440)
POTASSIUM SERPL-SCNC: 3.4 MMOL/L (ref 3.5–5.5)
SODIUM SERPL-SCNC: 160 MMOL/L (ref 135–145)
WBC # BLD AUTO: 13.51 X 10*3/UL (ref 4.5–10)

## 2022-08-02 RX ADMIN — CLOPIDOGREL BISULFATE SCH MG: 75 TABLET ORAL at 08:24

## 2022-08-02 RX ADMIN — ATORVASTATIN CALCIUM SCH MG: 20 TABLET, FILM COATED ORAL at 08:24

## 2022-08-02 RX ADMIN — ASPIRIN 81 MG CHEWABLE TABLET SCH MG: 81 TABLET CHEWABLE at 08:24

## 2022-08-02 RX ADMIN — BUPROPION HYDROCHLORIDE SCH MG: 150 TABLET, FILM COATED, EXTENDED RELEASE ORAL at 08:25

## 2022-08-02 RX ADMIN — INSULIN ASPART SCH UNIT: 100 INJECTION, SOLUTION INTRAVENOUS; SUBCUTANEOUS at 08:22

## 2022-08-02 RX ADMIN — MEMANTINE HYDROCHLORIDE SCH MG: 10 TABLET ORAL at 08:25

## 2022-08-02 RX ADMIN — CEFAZOLIN SCH MLS/HR: 330 INJECTION, POWDER, FOR SOLUTION INTRAMUSCULAR; INTRAVENOUS at 02:42

## 2022-08-02 RX ADMIN — MEMANTINE HYDROCHLORIDE SCH MG: 10 TABLET ORAL at 21:33

## 2022-08-02 RX ADMIN — DEXTROSE MONOHYDRATE PRN ML: 25 INJECTION, SOLUTION INTRAVENOUS at 22:30

## 2022-08-02 RX ADMIN — INSULIN ASPART SCH UNIT: 100 INJECTION, SOLUTION INTRAVENOUS; SUBCUTANEOUS at 14:08

## 2022-08-02 RX ADMIN — INSULIN HUMAN SCH MLS/HR: 100 INJECTION, SOLUTION PARENTERAL at 17:06

## 2022-08-02 RX ADMIN — Medication SCH MG: at 08:24

## 2022-08-02 RX ADMIN — ESCITALOPRAM SCH MG: 5 TABLET, FILM COATED ORAL at 08:25

## 2022-08-02 NOTE — P.CONS
History of Present Illness





- Reason for Consult


Consult date: 08/02/22


Goals of care


Requesting physician: Maximilian Huggins





- Chief Complaint


Generalized weakness and confusion





- History of Present Illness


The patient is a 81 year old female with a past medical history significant of 

COPD, Dementia, HLD, HTN, renal disease, and pancreatic cancer.  Her  

brought her to the emergency center with concerns of generalized weakness and 

worsening confusion. She is afebrile, no vomiting.  No cough or dyspnea.  








Review of Systems


ROS unobtainable: due to mental status





Past Medical History


Past Medical History: Asthma, Cancer, COPD, Dementia, Hyperlipidemia, Hypert

ension, Memory Impairment, Osteoarthritis (OA), Renal Disease


Additional Past Medical History / Comment(s): hiatal hernia, pancreatic CA, 

bilateral cataracts, chronic bronchitis, seasonal allergies, arteriosclerosis 

2009, rosacia, measles, hypoglycemia, buldging disc


History of Any Multi-Drug Resistant Organisms: None Reported


Past Surgical History: Bowel Resection


Additional Past Surgical History / Comment(s): left carotid endartectomy, bowel 

resection 2008 dr mckinney, bilateral cataracts removed, D & C, EGD & colonoscopy,


Past Anesthesia/Blood Transfusion Reactions: No Reported Reaction


Past Psychological History: No Psychological Hx Reported


Smoking Status: Former smoker


Past Alcohol Use History: Rare


Past Drug Use History: None Reported





- Past Family History


  ** Father


Family Medical History: Cancer





  ** Sister(s)


Family Medical History: Hypertension





  ** Mother


Family Medical History: Coronary Artery Disease (CAD), Hyperlipidemia





Medications and Allergies


                                Home Medications











 Medication  Instructions  Recorded  Confirmed  Type


 


Clopidogrel [Plavix] 75 mg PO DAILY 02/03/15 08/01/22 History


 


Aspirin EC [Ecotrin Low Dose] 81 mg PO DAILY 01/22/21 08/01/22 History


 


Memantine HCl 10 mg PO BID 01/22/21 08/01/22 History


 


Ferrous Sulfate [Iron (65  mg PO DAILY 05/05/22 08/01/22 History





Elemental)]    


 


Ginkgo Biloba 1200mg 1 tab PO DAILY 05/05/22 08/01/22 History


 


Simvastatin 40 mg PO HS 05/05/22 08/01/22 History


 


amLODIPine [Norvasc] 5 mg PO DAILY 05/05/22 08/01/22 History


 


buPROPion HCL [Wellbutrin XL] 150 mg PO DAILY 05/05/22 08/01/22 History


 


Escitalopram [Lexapro] 5 mg PO DAILY 05/20/22 08/01/22 History


 


Furosemide [Lasix] 40 mg PO DAILY 05/20/22 08/01/22 History








                                    Allergies











Allergy/AdvReac Type Severity Reaction Status Date / Time


 


No Known Allergies Allergy   Verified 08/01/22 11:56














Physical Exam


Vitals: 


                                   Vital Signs











  Temp Pulse Pulse Resp BP BP Pulse Ox


 


 08/02/22 13:00  98.3 F   96  16   113/72  96


 


 08/02/22 04:56  98.2 F   89  16   142/79  94 L


 


 08/01/22 20:10     16   


 


 08/01/22 19:31  98.3 F   109 H  16   104/72  98


 


 08/01/22 16:32  98.4 F   94  16   122/70  97


 


 08/01/22 16:05  97.8 F  94   16  108/64   96


 


 08/01/22 16:00   93   16  117/67  


 


 08/01/22 15:00   98   16  111/72  


 


 08/01/22 14:00   98   20  144/70   83 L








                                Intake and Output











 08/01/22 08/02/22 08/02/22





 22:59 06:59 14:59


 


Intake Total  1100 


 


Balance  1100 


 


Intake:   


 


  Intake, IV Titration  900 





  Amount   


 


    Sodium Chloride 0.9% 1,  900 





    000 ml @ 75 mls/hr IV .   





    [id number] [facility] Rx#:[id number]   


 


  Oral  200 


 


Other:   


 


  Voiding Method Diaper  Diaper





 Incontinent  Incontinent


 


  # Voids 1 2 


 


  # Bowel Movements 1 1 


 


  Weight 52.163 kg  











General:  No acute distress. Frail and cachectic appearing.


HEENT: Head is atraumatic, normocephalic. Sclerae are clear. Pupils equal, round

 and reactive to light bilaterally.  


CV: Heart regular in rate and rhythm positive S1 and S2. No clicks, rubs or 

murmurs.Peripheral pulses equal. 2/4


Lungs: Clear to auscultation bilaterally.  No wheezes rales or rhonchi. 

Respirations even and nonlabored. On RA


Abdomen/GI: Soft. Bowel sounds present in all 4 quadrants. Bowel sounds normoa

ctive. No abdominal tenderness.  


Musculoskeletal/ Extremities: SANTOS, + generalized weakness 


Vascular: Radial pulses equal. 2/4, No peripheral edema


Skin: Warm and dry.No rash.


Neurologic: Alert and oriented x 1, to person only. + memory impairment, 


Psychiatric: Flat affect.








Results


CBC & Chem 7: 


                                 08/02/22 03:28





                                 08/02/22 03:28


Labs: 


                  Abnormal Lab Results - Last 24 Hours (Table)











  08/01/22 08/01/22 08/01/22 Range/Units





  13:26 13:40 15:02 


 


WBC     (4.50-10.00)  X 10*3/uL


 


RBC     (4.10-5.20)  X 10*6/uL


 


Hgb     (12.0-15.0)  g/dL


 


Hct     (37.2-46.3)  %


 


MCHC     (32.0-37.0)  g/dL


 


MPV     (9.5-12.2)  fL


 


Immature Gran #     (0.00-0.04)  X 10*3/uL


 


Neutrophils #     (1.3-7.7)  k/uL


 


Lymphocytes #     (1.0-4.8)  k/uL


 


Monocytes #     (0.20-1.00)  X 10*3/uL


 


Eosinophils #     (0.04-0.35)  X 10*3/uL


 


Basophils #     (0.00-0.10)  X 10*3/uL


 


Sodium     (137-145)  mmol/L


 


Potassium     (3.5-5.5)  mmol/L


 


Chloride     ()  mmol/L


 


BUN     (7-17)  mg/dL


 


Creatinine     (0.52-1.04)  mg/dL


 


Est GFR (CKD-EPI)AfAm     (60.0-200.0)   


 


Est GFR (CKD-EPI)NonAf     (60.0-200.0)   


 


BUN/Creatinine Ratio     (12.00-20.00)  Ratio


 


Glucose     (74-99)  mg/dL


 


POC Glucose (mg/dL)  594 H    ()  mg/dL


 


Plasma Lactic Acid George    5.3 H*  (0.7-2.0)  mmol/L


 


Urine Glucose (UA)   4+ H   (Negative)  














  08/01/22 08/01/22 08/01/22 Range/Units





  15:11 16:14 17:16 


 


WBC     (4.50-10.00)  X 10*3/uL


 


RBC     (4.10-5.20)  X 10*6/uL


 


Hgb     (12.0-15.0)  g/dL


 


Hct     (37.2-46.3)  %


 


MCHC     (32.0-37.0)  g/dL


 


MPV     (9.5-12.2)  fL


 


Immature Gran #     (0.00-0.04)  X 10*3/uL


 


Neutrophils #     (1.3-7.7)  k/uL


 


Lymphocytes #     (1.0-4.8)  k/uL


 


Monocytes #     (0.20-1.00)  X 10*3/uL


 


Eosinophils #     (0.04-0.35)  X 10*3/uL


 


Basophils #     (0.00-0.10)  X 10*3/uL


 


Sodium     (137-145)  mmol/L


 


Potassium     (3.5-5.5)  mmol/L


 


Chloride     ()  mmol/L


 


BUN     (7-17)  mg/dL


 


Creatinine     (0.52-1.04)  mg/dL


 


Est GFR (CKD-EPI)AfAm     (60.0-200.0)   


 


Est GFR (CKD-EPI)NonAf     (60.0-200.0)   


 


BUN/Creatinine Ratio     (12.00-20.00)  Ratio


 


Glucose     (74-99)  mg/dL


 


POC Glucose (mg/dL)  455 H  437 H  444 H  ()  mg/dL


 


Plasma Lactic Acid George     (0.7-2.0)  mmol/L


 


Urine Glucose (UA)     (Negative)  














  08/01/22 08/01/22 08/01/22 Range/Units





  18:00 18:00 18:00 


 


WBC     (4.50-10.00)  X 10*3/uL


 


RBC     (4.10-5.20)  X 10*6/uL


 


Hgb     (12.0-15.0)  g/dL


 


Hct     (37.2-46.3)  %


 


MCHC     (32.0-37.0)  g/dL


 


MPV     (9.5-12.2)  fL


 


Immature Gran #     (0.00-0.04)  X 10*3/uL


 


Neutrophils #  8.7 H    (1.3-7.7)  k/uL


 


Lymphocytes #  0.7 L    (1.0-4.8)  k/uL


 


Monocytes #     (0.20-1.00)  X 10*3/uL


 


Eosinophils #     (0.04-0.35)  X 10*3/uL


 


Basophils #     (0.00-0.10)  X 10*3/uL


 


Sodium   152 H   (137-145)  mmol/L


 


Potassium     (3.5-5.5)  mmol/L


 


Chloride   111 H   ()  mmol/L


 


BUN   47 H   (7-17)  mg/dL


 


Creatinine   1.95 H   (0.52-1.04)  mg/dL


 


Est GFR (CKD-EPI)AfAm     (60.0-200.0)   


 


Est GFR (CKD-EPI)NonAf     (60.0-200.0)   


 


BUN/Creatinine Ratio     (12.00-20.00)  Ratio


 


Glucose   421 H   (74-99)  mg/dL


 


POC Glucose (mg/dL)     ()  mg/dL


 


Plasma Lactic Acid George    4.0 H*  (0.7-2.0)  mmol/L


 


Urine Glucose (UA)     (Negative)  














  08/01/22 08/01/22 08/02/22 Range/Units





  20:49 21:31 00:02 


 


WBC     (4.50-10.00)  X 10*3/uL


 


RBC     (4.10-5.20)  X 10*6/uL


 


Hgb     (12.0-15.0)  g/dL


 


Hct     (37.2-46.3)  %


 


MCHC     (32.0-37.0)  g/dL


 


MPV     (9.5-12.2)  fL


 


Immature Gran #     (0.00-0.04)  X 10*3/uL


 


Neutrophils #     (1.3-7.7)  k/uL


 


Lymphocytes #     (1.0-4.8)  k/uL


 


Monocytes #     (0.20-1.00)  X 10*3/uL


 


Eosinophils #     (0.04-0.35)  X 10*3/uL


 


Basophils #     (0.00-0.10)  X 10*3/uL


 


Sodium     (137-145)  mmol/L


 


Potassium     (3.5-5.5)  mmol/L


 


Chloride     ()  mmol/L


 


BUN     (7-17)  mg/dL


 


Creatinine     (0.52-1.04)  mg/dL


 


Est GFR (CKD-EPI)AfAm     (60.0-200.0)   


 


Est GFR (CKD-EPI)NonAf     (60.0-200.0)   


 


BUN/Creatinine Ratio     (12.00-20.00)  Ratio


 


Glucose     (74-99)  mg/dL


 


POC Glucose (mg/dL)   241 H   ()  mg/dL


 


Plasma Lactic Acid George  3.6 H*   2.4 H*  (0.7-2.0)  mmol/L


 


Urine Glucose (UA)     (Negative)  














  08/02/22 08/02/22 08/02/22 Range/Units





  03:28 03:28 07:20 


 


WBC  13.51 H    (4.50-10.00)  X 10*3/uL


 


RBC  3.65 L    (4.10-5.20)  X 10*6/uL


 


Hgb  10.6 L    (12.0-15.0)  g/dL


 


Hct  33.8 L    (37.2-46.3)  %


 


MCHC  31.4 L    (32.0-37.0)  g/dL


 


MPV  12.4 H    (9.5-12.2)  fL


 


Immature Gran #  0.19 H    (0.00-0.04)  X 10*3/uL


 


Neutrophils #  10.39 H    (1.3-7.7)  k/uL


 


Lymphocytes #     (1.0-4.8)  k/uL


 


Monocytes #  1.05 H    (0.20-1.00)  X 10*3/uL


 


Eosinophils #  0.02 L    (0.04-0.35)  X 10*3/uL


 


Basophils #  0.15 H    (0.00-0.10)  X 10*3/uL


 


Sodium   160 H   (137-145)  mmol/L


 


Potassium   3.4 L   (3.5-5.5)  mmol/L


 


Chloride   119 H   ()  mmol/L


 


BUN   43.4 H   (7-17)  mg/dL


 


Creatinine   2.0 H   (0.52-1.04)  mg/dL


 


Est GFR (CKD-EPI)AfAm   26.8 L   (60.0-200.0)   


 


Est GFR (CKD-EPI)NonAf   23.2 L   (60.0-200.0)   


 


BUN/Creatinine Ratio   21.70 H   (12.00-20.00)  Ratio


 


Glucose     (74-99)  mg/dL


 


POC Glucose (mg/dL)    197 H  ()  mg/dL


 


Plasma Lactic Acid George     (0.7-2.0)  mmol/L


 


Urine Glucose (UA)     (Negative)  














  08/02/22 Range/Units





  11:48 


 


WBC   (4.50-10.00)  X 10*3/uL


 


RBC   (4.10-5.20)  X 10*6/uL


 


Hgb   (12.0-15.0)  g/dL


 


Hct   (37.2-46.3)  %


 


MCHC   (32.0-37.0)  g/dL


 


MPV   (9.5-12.2)  fL


 


Immature Gran #   (0.00-0.04)  X 10*3/uL


 


Neutrophils #   (1.3-7.7)  k/uL


 


Lymphocytes #   (1.0-4.8)  k/uL


 


Monocytes #   (0.20-1.00)  X 10*3/uL


 


Eosinophils #   (0.04-0.35)  X 10*3/uL


 


Basophils #   (0.00-0.10)  X 10*3/uL


 


Sodium   (137-145)  mmol/L


 


Potassium   (3.5-5.5)  mmol/L


 


Chloride   ()  mmol/L


 


BUN   (7-17)  mg/dL


 


Creatinine   (0.52-1.04)  mg/dL


 


Est GFR (CKD-EPI)AfAm   (60.0-200.0)   


 


Est GFR (CKD-EPI)NonAf   (60.0-200.0)   


 


BUN/Creatinine Ratio   (12.00-20.00)  Ratio


 


Glucose   (74-99)  mg/dL


 


POC Glucose (mg/dL)  243 H  ()  mg/dL


 


Plasma Lactic Acid George   (0.7-2.0)  mmol/L


 


Urine Glucose (UA)   (Negative)  











Chest x-ray: report reviewed





Assessment and Plan


Assessment: 





Social


* Occupation - retired


* Marital status -  for 28 years to Kevin


* Children/grandchildren - No biological children, has 2 step daughters


* Residence - house


* Who do you reside with - 


* ETOH - No


* Tobacco - Former smoker


* Illicit drugs - NO





Spiritual/Cultural


* A spiritual person - No


* Adventism - Episcopalian


* Belong to a particular Pentecostal - No


* Beliefs a source of comfort and  strength - No


* Restorationist or cultural practices restrictions - No 


* EOL considerations/rituals? No





Functional Assessment


* Able to walk independently - No, requires 1 person assistance


* Assistive devices - walker or cane


* Able to use the bathroom independently - No


* Continent - No, wears brief at home


* Require assistance bathing- Yes, has a helper 1-2 x week


* Able to feed self - sometimes she eats better with assistance


* Who prepares meals - her 


* How many meals a day eaten - 2


* What percentage of meals eaten daily - varies greatly


* Able to clean house/do laundry - No


* Transportation -Her 


* Able to shop - No


* Who manages medications - Her husand


* Who manages finances - Her 





PPS score - 60%





Psychological/Emotional 


* Dementia present - Yes


* Insight and judgment - Not intact


* Depression - No


* Suicidal thoughts - YONY


* Good support system - yes


* Patients goals - Optimize functionality/comfort


* Frequent hospitalizations - yes


* Desire to keep coming back to the hospital for treatment - Yes





Symptoms


* Pain - 0/10, continue Tylenol prn


* Fatigue - + generalized weakness and fatigue, + daytime sleepiness


* SOB - No, on RA


* Insomnia - No 


* N/V - No


* Anxiety - Continue Lexapro


* Depression - Continue Wellbutrin


* Confusion - Yes, continue Namenda


* Agitation - No


* Hallucinations - No


* Appetite/weight loss - + decrease in appetite, no recent weight loss. Continue

   Carbohydrate consistent diet with [facility]


* Dysphagia -No


* Constipation - No


* Incontinence - Yes, wears briefs


* Itch - No














Plan: 


Summary/Goals - The patient is sleeping in bed and appears comfortable.  

Palliative care philosophies and services explained to her  whom is at 

the bedside.  He states that he has taken the patient to follow up with the 

surgeon from [facility] who biopsied the pancreas.  He does not recommend 

surgical interventions for her pancreatic cancer.  The patient has also followed

 up with oncology since her last admission in June.  Her  states that 

have offered treatments, such as chemo and radiation.  However after 

consideration and speaking to his family, they have decided against any 

treatment.  Her  understands her prognosis and is prepared for her to 

decline.  However, he believes she is satisfied with her current qol.  He agreed

 to go home with OP palliative care services, and then transition to hospice 

when he feels it is appropriate.





Recommendations - Discharge home with visiting nurse and OP palliative care when

 medically stable.





Advanced Directives - None on file, information provided





Code Status - DNR





Thank you for this consult





Nicci Cowan [facility]


Palliative Care


[facility] [id number]


Email: [email or web address]








Time with Patient: Greater than 30

## 2022-08-02 NOTE — P.HPIM
History of Present Illness


H&P Date: 08/02/22


Chief Complaint: Increased weakness, confusion


This is a 81-year-old female with past medical history of stage IV pancreatic 

cancer, stage IV chronic kidney disease and multiple other medical issues 

brought into the ER by  for increased weakness and confusion and multiple

other medical issues.  Vague historian, currently no family at bedside. Denies 

nausea, vomiting or diarrhea.  Denies abdominal pain.  Denies chest pain, 

palpitations or shortness of breath.  Denies pain.  On admission hypernatremic, 

sodium 152, potassium 3.5, lactic acid 3.6, afebrile, normal WBC, chloride 111, 

bicarb 29, BUN 47, creatinine 1.95, blood sugars in the 400s, alk phos 191, UA 

reporting 4+ glucose, chest x-ray reporting mild chronic progression multiple 

changes bilaterally, symmetric 1 cm densities over bilateral upper lungs not 

clearly identified nodules on recent CT, presumed external to the patient, 

square in shape, cardiac silhouette stable, within normal limits with a at

herosclerotic change aortic knob.  IV fluid hydration initiated.  Currently 

remains afebrile, WBC increased to 13.5, hemoglobin 10.6, platelets 223, sodium 

worsening up to 160, potassium 3.4, chloride 119 creatinine 2, blood sugars in 

the low 100s, lactic acid decreased to 1.3.  Staff reports Dr. Fox, Oncology, 

met with family last week and they chose no further treatment with radiation, 

chemotherapy and the patient is appropriate for palliative care.








Review of Systems





Review of Systems





Currently no family at bedside, Unable to obtain Review of Systems due to 

patient's dementia/mental state.





Past Medical History


Past Medical History: Asthma, Cancer, COPD, Dementia, Hyperlipidemia, 

Hypertension, Memory Impairment, Osteoarthritis (OA), Renal Disease


Additional Past Medical History / Comment(s): hiatal hernia, pancreatic CA, 

bilateral cataracts, chronic bronchitis, seasonal allergies, arteriosclerosis 

2009, rosacia, measles, hypoglycemia, buldging disc


History of Any Multi-Drug Resistant Organisms: None Reported


Past Surgical History: Bowel Resection


Additional Past Surgical History / Comment(s): left carotid endartectomy, bowel 

resection 2008 dr mckinney, bilateral cataracts removed, D & C, EGD & colonoscopy,


Past Anesthesia/Blood Transfusion Reactions: No Reported Reaction


Past Psychological History: No Psychological Hx Reported


Smoking Status: Former smoker


Past Alcohol Use History: Rare


Past Drug Use History: None Reported





- Past Family History


  ** Father


Family Medical History: Cancer





  ** Sister(s)


Family Medical History: Hypertension





  ** Mother


Family Medical History: Coronary Artery Disease (CAD), Hyperlipidemia





Medications and Allergies


                                Home Medications











 Medication  Instructions  Recorded  Confirmed  Type


 


Clopidogrel [Plavix] 75 mg PO DAILY 02/03/15 08/01/22 History


 


Aspirin EC [Ecotrin Low Dose] 81 mg PO DAILY 01/22/21 08/01/22 History


 


Memantine HCl 10 mg PO BID 01/22/21 08/01/22 History


 


Ferrous Sulfate [Iron (65  mg PO DAILY 05/05/22 08/01/22 History





Elemental)]    


 


Ginkgo Biloba 1200mg 1 tab PO DAILY 05/05/22 08/01/22 History


 


Simvastatin 40 mg PO HS 05/05/22 08/01/22 History


 


amLODIPine [Norvasc] 5 mg PO DAILY 05/05/22 08/01/22 History


 


buPROPion HCL [Wellbutrin XL] 150 mg PO DAILY 05/05/22 08/01/22 History


 


Escitalopram [Lexapro] 5 mg PO DAILY 05/20/22 08/01/22 History


 


Furosemide [Lasix] 40 mg PO DAILY 05/20/22 08/01/22 History








                                    Allergies











Allergy/AdvReac Type Severity Reaction Status Date / Time


 


No Known Allergies Allergy   Verified 08/01/22 11:56














Physical Exam


Vitals: 


                                   Vital Signs











  Temp Pulse Resp BP Pulse Ox


 


 08/02/22 13:00  98.3 F  96  16  113/72  96


 


 08/02/22 04:56  98.2 F  89  16  142/79  94 L


 


 08/01/22 20:10    16  


 


 08/01/22 19:31  98.3 F  109 H  16  104/72  98


 


 08/01/22 16:32  98.4 F  94  16  122/70  97








                                Intake and Output











 08/02/22 08/02/22 08/02/22





 06:59 14:59 22:59


 


Intake Total 1100  


 


Balance 1100  


 


Intake:   


 


  Intake, IV Titration 900  





  Amount   


 


    Sodium Chloride 0.9% 1, 900  





    000 ml @ 75 mls/hr IV .   





    [id number] [facility] Rx#:[id number]   


 


  Oral 200  


 


Other:   


 


  Voiding Method  Diaper 





  Incontinent 


 


  # Voids 2  


 


  # Bowel Movements 1  


 


  Weight  52.163 kg 











PHYSICAL EXAM:


VITAL SIGNS: As above


GENERAL: Cachectic,Sitting up in bed, no acute distress, pleasantly confused, at

 baseline, alert and oriented 1.


HEENT: Head atraumatic, normocephalic. Conjunctivae normal. eyes normal.  No 

sclera icterus.


NECK: Supple, No JVD.


CARDIOVASCULAR:  S1, S2 regular. No murmur


RESPIRATION: Unlabored, Breath sounds diminished in the bases. No rhonchi or 

crackles. No bronchial breathing.


ABDOMEN:  Soft,  nontender . No guarding. no masses palpable. Bowel sounds 

heard.


LEGS:  No edema. no swelling 


NERVOUS SYSTEM: Limited exam; Alert and oriented X1 to person, baseline, 

pleasant, cooperative.Diffuse weakness.


Skin: Warm and dry, bilateral heels stage I , coccyx X 2, stage II -refer to 

nursing measurements.











Results


CBC & Chem 7: 


                                 08/02/22 03:28





                                 08/02/22 03:28


Labs: 


                  Abnormal Lab Results - Last 24 Hours (Table)











  08/01/22 08/01/22 08/01/22 Range/Units





  16:14 17:16 18:00 


 


WBC     (4.50-10.00)  X 10*3/uL


 


RBC     (4.10-5.20)  X 10*6/uL


 


Hgb     (12.0-15.0)  g/dL


 


Hct     (37.2-46.3)  %


 


MCHC     (32.0-37.0)  g/dL


 


MPV     (9.5-12.2)  fL


 


Immature Gran #     (0.00-0.04)  X 10*3/uL


 


Neutrophils #    8.7 H  (1.3-7.7)  k/uL


 


Lymphocytes #    0.7 L  (1.0-4.8)  k/uL


 


Monocytes #     (0.20-1.00)  X 10*3/uL


 


Eosinophils #     (0.04-0.35)  X 10*3/uL


 


Basophils #     (0.00-0.10)  X 10*3/uL


 


Sodium     (137-145)  mmol/L


 


Potassium     (3.5-5.5)  mmol/L


 


Chloride     ()  mmol/L


 


BUN     (7-17)  mg/dL


 


Creatinine     (0.52-1.04)  mg/dL


 


Est GFR (CKD-EPI)AfAm     (60.0-200.0)   


 


Est GFR (CKD-EPI)NonAf     (60.0-200.0)   


 


BUN/Creatinine Ratio     (12.00-20.00)  Ratio


 


Glucose     (74-99)  mg/dL


 


POC Glucose (mg/dL)  437 H  444 H   ()  mg/dL


 


Plasma Lactic Acid George     (0.7-2.0)  mmol/L














  08/01/22 08/01/22 08/01/22 Range/Units





  18:00 18:00 20:49 


 


WBC     (4.50-10.00)  X 10*3/uL


 


RBC     (4.10-5.20)  X 10*6/uL


 


Hgb     (12.0-15.0)  g/dL


 


Hct     (37.2-46.3)  %


 


MCHC     (32.0-37.0)  g/dL


 


MPV     (9.5-12.2)  fL


 


Immature Gran #     (0.00-0.04)  X 10*3/uL


 


Neutrophils #     (1.3-7.7)  k/uL


 


Lymphocytes #     (1.0-4.8)  k/uL


 


Monocytes #     (0.20-1.00)  X 10*3/uL


 


Eosinophils #     (0.04-0.35)  X 10*3/uL


 


Basophils #     (0.00-0.10)  X 10*3/uL


 


Sodium  152 H    (137-145)  mmol/L


 


Potassium     (3.5-5.5)  mmol/L


 


Chloride  111 H    ()  mmol/L


 


BUN  47 H    (7-17)  mg/dL


 


Creatinine  1.95 H    (0.52-1.04)  mg/dL


 


Est GFR (CKD-EPI)AfAm     (60.0-200.0)   


 


Est GFR (CKD-EPI)NonAf     (60.0-200.0)   


 


BUN/Creatinine Ratio     (12.00-20.00)  Ratio


 


Glucose  421 H    (74-99)  mg/dL


 


POC Glucose (mg/dL)     ()  mg/dL


 


Plasma Lactic Acid George   4.0 H*  3.6 H*  (0.7-2.0)  mmol/L














  08/01/22 08/02/22 08/02/22 Range/Units





  21:31 00:02 03:28 


 


WBC    13.51 H  (4.50-10.00)  X 10*3/uL


 


RBC    3.65 L  (4.10-5.20)  X 10*6/uL


 


Hgb    10.6 L  (12.0-15.0)  g/dL


 


Hct    33.8 L  (37.2-46.3)  %


 


MCHC    31.4 L  (32.0-37.0)  g/dL


 


MPV    12.4 H  (9.5-12.2)  fL


 


Immature Gran #    0.19 H  (0.00-0.04)  X 10*3/uL


 


Neutrophils #    10.39 H  (1.3-7.7)  k/uL


 


Lymphocytes #     (1.0-4.8)  k/uL


 


Monocytes #    1.05 H  (0.20-1.00)  X 10*3/uL


 


Eosinophils #    0.02 L  (0.04-0.35)  X 10*3/uL


 


Basophils #    0.15 H  (0.00-0.10)  X 10*3/uL


 


Sodium     (137-145)  mmol/L


 


Potassium     (3.5-5.5)  mmol/L


 


Chloride     ()  mmol/L


 


BUN     (7-17)  mg/dL


 


Creatinine     (0.52-1.04)  mg/dL


 


Est GFR (CKD-EPI)AfAm     (60.0-200.0)   


 


Est GFR (CKD-EPI)NonAf     (60.0-200.0)   


 


BUN/Creatinine Ratio     (12.00-20.00)  Ratio


 


Glucose     (74-99)  mg/dL


 


POC Glucose (mg/dL)  241 H    ()  mg/dL


 


Plasma Lactic Acid George   2.4 H*   (0.7-2.0)  mmol/L














  08/02/22 08/02/22 08/02/22 Range/Units





  03:28 07:20 11:48 


 


WBC     (4.50-10.00)  X 10*3/uL


 


RBC     (4.10-5.20)  X 10*6/uL


 


Hgb     (12.0-15.0)  g/dL


 


Hct     (37.2-46.3)  %


 


MCHC     (32.0-37.0)  g/dL


 


MPV     (9.5-12.2)  fL


 


Immature Gran #     (0.00-0.04)  X 10*3/uL


 


Neutrophils #     (1.3-7.7)  k/uL


 


Lymphocytes #     (1.0-4.8)  k/uL


 


Monocytes #     (0.20-1.00)  X 10*3/uL


 


Eosinophils #     (0.04-0.35)  X 10*3/uL


 


Basophils #     (0.00-0.10)  X 10*3/uL


 


Sodium  160 H    (137-145)  mmol/L


 


Potassium  3.4 L    (3.5-5.5)  mmol/L


 


Chloride  119 H    ()  mmol/L


 


BUN  43.4 H    (7-17)  mg/dL


 


Creatinine  2.0 H    (0.52-1.04)  mg/dL


 


Est GFR (CKD-EPI)AfAm  26.8 L    (60.0-200.0)   


 


Est GFR (CKD-EPI)NonAf  23.2 L    (60.0-200.0)   


 


BUN/Creatinine Ratio  21.70 H    (12.00-20.00)  Ratio


 


Glucose     (74-99)  mg/dL


 


POC Glucose (mg/dL)   197 H  243 H  ()  mg/dL


 


Plasma Lactic Acid George     (0.7-2.0)  mmol/L














Thrombosis Risk Factor Assmnt





- Choose All That Apply


Each Factor Represents 1 point: Abnormal pulmonary function (COPD)


Each Risk Factor Represents 2 Points: Malignancy


Each Risk Factor Represents 3 Points: Age 75 years or older


Thrombosis Risk Factor Assessment Total Risk Factor Score: 6


Thrombosis Risk Factor Assessment Level: High Risk





Assessment and Plan


Assessment: 


Hypernatremia, hypovolemic, in a patient with decreased appetite with underlying

 pancreatic cancer


Acute metabolic encephalopathy secondary to the above in addition to underlying 

dementia


Hyperglycemia


Acute renal failure, likely prerenal


chronic renal failure, stage IV


Hyperchloremia


Hypokalemia


Pressure ulcers, present on admission; bilateral heels stage I , coccyx stage II

 


History of stage IV pancreatic cancer status post ERCP, biliary stent placement 

at [facility].


COPD, not in exacerbation


Memory impairment/dementia


Hypertension


Hyperlipidemia


Previous history of smoking


No Code














Plan: Continue on current medication regime ,monitoring and symptomatic 

treatment.  Per Dr. Fox, oncology patient is appropriate for palliative care.  

Palliative care/hospice NP consulted.  IV fluids changed to D5W .  Insulin drip-

therefore requires transferred to 3 S. to accommodate.  Nephrology consult 

initiated.  Potassium replacement. Wound care. Prognosis poor, given multiple 

complex medical issues.  maintain supportive care.  No family at bedside at this

 time.  Social work consulted to assist with discharge resources.











The impression and plan of care has been dictated as directed.





Dr.:


I performed a history and examination of this patient,  discussed the same with 

the dictator.  I agree with the dictator's note ,documented as a scribe.  Any 

additional findings or plans will be noted.

## 2022-08-03 LAB
ANION GAP SERPL CALC-SCNC: 5 MMOL/L
BUN SERPL-SCNC: 37 MG/DL (ref 7–17)
CALCIUM SPEC-MCNC: 9.1 MG/DL (ref 8.4–10.2)
CHLORIDE SERPL-SCNC: 115 MMOL/L (ref 98–107)
CO2 SERPL-SCNC: 28 MMOL/L (ref 22–30)
GLUCOSE BLD-MCNC: 107 MG/DL (ref 70–110)
GLUCOSE BLD-MCNC: 112 MG/DL (ref 70–110)
GLUCOSE BLD-MCNC: 113 MG/DL (ref 70–110)
GLUCOSE BLD-MCNC: 148 MG/DL (ref 70–110)
GLUCOSE BLD-MCNC: 167 MG/DL (ref 70–110)
GLUCOSE BLD-MCNC: 169 MG/DL (ref 70–110)
GLUCOSE BLD-MCNC: 176 MG/DL (ref 70–110)
GLUCOSE BLD-MCNC: 205 MG/DL (ref 70–110)
GLUCOSE BLD-MCNC: 211 MG/DL (ref 70–110)
GLUCOSE BLD-MCNC: 216 MG/DL (ref 70–110)
GLUCOSE BLD-MCNC: 224 MG/DL (ref 70–110)
GLUCOSE BLD-MCNC: 225 MG/DL (ref 70–110)
GLUCOSE BLD-MCNC: 231 MG/DL (ref 70–110)
GLUCOSE BLD-MCNC: 236 MG/DL (ref 70–110)
GLUCOSE BLD-MCNC: 265 MG/DL (ref 70–110)
GLUCOSE BLD-MCNC: 273 MG/DL (ref 70–110)
GLUCOSE BLD-MCNC: 287 MG/DL (ref 70–110)
GLUCOSE BLD-MCNC: 291 MG/DL (ref 70–110)
GLUCOSE BLD-MCNC: 298 MG/DL (ref 70–110)
GLUCOSE BLD-MCNC: 303 MG/DL (ref 70–110)
GLUCOSE BLD-MCNC: 84 MG/DL (ref 70–110)
GLUCOSE BLD-MCNC: 96 MG/DL (ref 70–110)
GLUCOSE SERPL-MCNC: 191 MG/DL (ref 74–99)
MAGNESIUM SPEC-SCNC: 2.1 MG/DL (ref 1.6–2.3)
POTASSIUM SERPL-SCNC: 3.3 MMOL/L (ref 3.5–5.1)
SODIUM SERPL-SCNC: 148 MMOL/L (ref 137–145)

## 2022-08-03 RX ADMIN — ESCITALOPRAM SCH MG: 5 TABLET, FILM COATED ORAL at 09:21

## 2022-08-03 RX ADMIN — BUPROPION HYDROCHLORIDE SCH MG: 150 TABLET, FILM COATED, EXTENDED RELEASE ORAL at 09:20

## 2022-08-03 RX ADMIN — CLOPIDOGREL BISULFATE SCH MG: 75 TABLET ORAL at 09:20

## 2022-08-03 RX ADMIN — POTASSIUM CHLORIDE SCH MLS/HR: 14.9 INJECTION, SOLUTION INTRAVENOUS at 10:30

## 2022-08-03 RX ADMIN — ASPIRIN 81 MG CHEWABLE TABLET SCH MG: 81 TABLET CHEWABLE at 09:20

## 2022-08-03 RX ADMIN — POTASSIUM CHLORIDE SCH MLS/HR: 14.9 INJECTION, SOLUTION INTRAVENOUS at 23:05

## 2022-08-03 RX ADMIN — MEMANTINE HYDROCHLORIDE SCH MG: 10 TABLET ORAL at 20:59

## 2022-08-03 RX ADMIN — MEMANTINE HYDROCHLORIDE SCH MG: 10 TABLET ORAL at 09:20

## 2022-08-03 RX ADMIN — ATORVASTATIN CALCIUM SCH MG: 20 TABLET, FILM COATED ORAL at 09:20

## 2022-08-03 RX ADMIN — Medication SCH MG: at 09:20

## 2022-08-03 NOTE — P.NPCON
History of Present Illness





- Reason for Consult


acute renal failure, hypernatremia





- History of Present Illness





Reason for consultation: Acute kidney injury and hypernatremia





History of present illness:


Patient is a 81-year-old female seen in renal consultation for acute kidney 

injury and hypernatremia.  Patient's creatinine in May 2022 was in the range of 

1-1.1 and was elevated at 1.89 this admission and was 2.0 yesterday.  Sodium 

level was 149 on admission and was up to 160 early yesterday morning and down to

151 yesterday evening.  She is currently maintained on D5W at 50 mL an hour.  

Patient was brought to the hospital due to confusion and generalized weakness.  

Patient has history of dementia and pancreatic cancer.  Patient is not a 

reliable historian and she is not sure where she is at this time.  Blood 

pressure stable.  Afebrile.  No edema.  She was taking Lasix at home which is 

currently held.  I don't see any nonsteroidals and her home medications.  Not on

any medication for diabetes outpatient.  Her blood sugar was over 600 and she 

has been maintained on insulin drip.





Vital signs are stable.


General: Awake.  No acute distress.


HEENT: Head exam is unremarkable. 


LUNGS: Breath sounds decreased.


HEART: Rate and Rhythm are regular. 


ABDOMEN: Soft, no distention.


EXTREMITITES: No edema.





Past Medical History


Past Medical History: Asthma, Cancer, COPD, Dementia, Hyperlipidemia, 

Hypertension, Memory Impairment, Osteoarthritis (OA), Renal Disease


Additional Past Medical History / Comment(s): hiatal hernia, pancreatic CA, 

bilateral cataracts, chronic bronchitis, seasonal allergies, arteriosclerosis 

2009, rosacia, measles, hypoglycemia, buldging disc


History of Any Multi-Drug Resistant Organisms: None Reported


Past Surgical History: Bowel Resection


Additional Past Surgical History / Comment(s): left carotid endartectomy, bowel 

resection 2008 dr mckinney, bilateral cataracts removed, D & C, EGD & colonoscopy,


Past Anesthesia/Blood Transfusion Reactions: No Reported Reaction


Past Psychological History: No Psychological Hx Reported


Smoking Status: Former smoker


Past Alcohol Use History: Rare


Past Drug Use History: None Reported





- Past Family History


  ** Father


Family Medical History: Cancer





  ** Sister(s)


Family Medical History: Hypertension





  ** Mother


Family Medical History: Coronary Artery Disease (CAD), Hyperlipidemia





Medications and Allergies


                                Home Medications











 Medication  Instructions  Recorded  Confirmed  Type


 


Clopidogrel [Plavix] 75 mg PO DAILY 02/03/15 08/01/22 History


 


Aspirin EC [Ecotrin Low Dose] 81 mg PO DAILY 01/22/21 08/01/22 History


 


Memantine HCl 10 mg PO BID 01/22/21 08/01/22 History


 


Ferrous Sulfate [Iron (65  mg PO DAILY 05/05/22 08/01/22 History





Elemental)]    


 


Ginkgo Biloba 1200mg 1 tab PO DAILY 05/05/22 08/01/22 History


 


Simvastatin 40 mg PO HS 05/05/22 08/01/22 History


 


amLODIPine [Norvasc] 5 mg PO DAILY 05/05/22 08/01/22 History


 


buPROPion HCL [Wellbutrin XL] 150 mg PO DAILY 05/05/22 08/01/22 History


 


Escitalopram [Lexapro] 5 mg PO DAILY 05/20/22 08/01/22 History


 


Furosemide [Lasix] 40 mg PO DAILY 05/20/22 08/01/22 History








                                    Allergies











Allergy/AdvReac Type Severity Reaction Status Date / Time


 


No Known Allergies Allergy   Verified 08/01/22 11:56














Physical Exam


Vitals: 


                                   Vital Signs











  Temp Pulse Resp BP Pulse Ox


 


 08/03/22 04:00  98.3 F  83  17  118/76  98


 


 08/03/22 00:00  98.6 F  89  17  106/66  97


 


 08/02/22 20:00  98.8 F  94  17  95/58  97


 


 08/02/22 13:00  98.3 F  96  16  113/72  96








                                Intake and Output











 08/02/22 08/03/22 08/03/22





 22:59 06:59 14:59


 


Intake Total 28.200 9.725 1.631


 


Output Total  5 


 


Balance 28.200 4.725 1.631


 


Intake:   


 


  Intake, IV Titration 28.200 9.725 1.631





  Amount   


 


    Insulin Regular 100 unit 28.200 9.725 1.631





    In Sodium Chloride 0.9%   





    100 ml @ Titrate IV .Q0M   





    [facility] Rx#:[id number]   


 


Output:   


 


  Urine  5 


 


Other:   


 


  Voiding Method Diaper  





 Incontinent  


 


  # Bowel Movements  1 














Results





- Lab Results


                             Most recent lab results











Calcium  10.1 mg/dL (8.7-10.3)   08/02/22  03:28    


 


Magnesium  3.3 mg/dL (1.6-2.3)  H  08/01/22  12:00    














                                 08/02/22 03:28





                                 08/02/22 19:47





Assessment and Plan


Plan: 





Assessment:


1.  Acute kidney injury secondary to ATN secondary to hypovolemic from 

hyerpglycemia, poor intake and diuresis.  Creatinine 2.0 yesterday.  Creatinine 

in May 2022 was in the range of 1-1.1.  No proteinuria on UA.


2.  Hypernatremia from lack of oral water intake.


3.  Pancreatic cancer.


4.  Benign hypertension.  Blood pressure currently on the lower side.


5.  Dementia.  


6.  Hypokalemia from poor intake and diuresis.  Replaced.


7.  Hyperglycemia maintained on insulin drip.  A1c 10.7 this admission.





Plan:


Maintain D5W at 50 mL an hour.


Follow-up morning labs.


Avoid nephrotoxins.


Check renal ultrasound.


Encourage oral intake, including free water.


Hold amlodipine for systolic blood pressure less than 125.





Thank you for the consultation.  I will continue to follow the patient with you 

during her hospital stay.

## 2022-08-03 NOTE — P.PN
Subjective


Progress Note Date: 08/03/22


This is a 81-year-old female with past medical history of stage IV pancreatic 

cancer, stage IV chronic kidney disease and multiple other medical issues 

brought into the ER by  for increased weakness and confusion and multiple

other medical issues.  Vague historian, currently no family at bedside. Denies 

nausea, vomiting or diarrhea.  Denies abdominal pain.  Denies chest pain, 

palpitations or shortness of breath.  Denies pain.  On admission hypernatremic, 

sodium 152, potassium 3.5, lactic acid 3.6, afebrile, normal WBC, chloride 111, 

bicarb 29, BUN 47, creatinine 1.95, blood sugars in the 400s, alk phos 191, UA 

reporting 4+ glucose, chest x-ray reporting mild chronic progression multiple 

changes bilaterally, symmetric 1 cm densities over bilateral upper lungs not 

clearly identified nodules on recent CT, presumed external to the patient, 

square in shape, cardiac silhouette stable, within normal limits with a 

atherosclerotic change aortic knob.  IV fluid hydration initiated.  Currently re

rene afebrile, WBC increased to 13.5, hemoglobin 10.6, platelets 223, sodium 

worsening up to 160, potassium 3.4, chloride 119 creatinine 2, blood sugars in 

the low 100s, lactic acid decreased to 1.3.  Staff reports Dr. Fox, Oncology, 

met with family last week and they chose no further treatment with radiation, 

chemotherapy and the patient is appropriate for palliative care.





08/03/2022 sitting up in bed, poor diet intake.  Denies pain.  Sodium decreased 

to 148 on D5W.  Renal function slowly improving.  Blood sugars better controlled

on insulin drip currently in the 190s.  Hemoglobin A1c 10.7  Receiving potassium

supplements for potassium 3.3.  Vital signs stable, maintaining O2 sats in the 

high 90s on room air.   is agreeable to palliative care only, as reported

per hospice NP.  No family at bedside this morning.











Objective





- Vital Signs


Vital signs: 


                                   Vital Signs











Temp  97.9 F   08/03/22 08:00


 


Pulse  85   08/03/22 08:00


 


Resp  16   08/03/22 08:00


 


BP  124/78   08/03/22 08:00


 


Pulse Ox  99   08/03/22 08:00


 


FiO2      








                                 Intake & Output











 08/02/22 08/03/22 08/03/22





 18:59 06:59 18:59


 


Intake Total 4.667 33.258 1.631


 


Output Total  5 


 


Balance 4.667 28.258 1.631


 


Weight 52.163 kg  


 


Intake:   


 


  Intake, IV Titration 4.667 33.258 1.631





  Amount   


 


    Insulin Regular 100 unit 4.667 33.258 1.631





    In Sodium Chloride 0.9%   





    100 ml @ Titrate IV .Q0M   





    [facility] Rx#:[id number]   


 


Output:   


 


  Urine  5 


 


Other:   


 


  Voiding Method Diaper Diaper Diaper





 Incontinent Incontinent Incontinent


 


  # Bowel Movements  1 














- Exam


PHYSICAL EXAM:


VITAL SIGNS: As above


GENERAL: Cachectic,Sitting up in bed, no acute distress, pleasantly confused, 

alert and oriented 1.


HEENT: Head atraumatic, normocephalic. Conjunctivae normal. eyes normal.  No 

sclera icterus.


NECK: Supple, No JVD.


CARDIOVASCULAR:  S1, S2 regular. No murmur


RESPIRATION: Unlabored, Breath sounds diminished in the bases. 


ABDOMEN:  Soft,  nontender . No guarding. no masses palpable. Bowel sounds 

heard.


LEGS:  No edema. no swelling 


NERVOUS SYSTEM: Limited exam; Alert and oriented X1 to person, baseline, 

pleasant, cooperative.Diffuse weakness.


Skin: Warm and dry, bilateral heels stage I , coccyx X 2, stage II -refer to 

nursing measurements.











- Labs


CBC & Chem 7: 


                                 08/02/22 03:28





                                 08/03/22 08:28


Labs: 


                  Abnormal Lab Results - Last 24 Hours (Table)











  08/02/22 08/02/22 08/02/22 Range/Units





  08:01 11:48 16:35 


 


Sodium     (137-145)  mmol/L


 


Potassium     (3.5-5.1)  mmol/L


 


Chloride     ()  mmol/L


 


BUN     (7-17)  mg/dL


 


Creatinine     (0.52-1.04)  mg/dL


 


Glucose     (74-99)  mg/dL


 


POC Glucose (mg/dL)   243 H  432 H  ()  mg/dL


 


Hemoglobin A1c  10.7 H    (0.0-6.0)  %














  08/02/22 08/02/22 08/02/22 Range/Units





  18:02 19:03 19:47 


 


Sodium    151 H  (137-145)  mmol/L


 


Potassium     (3.5-5.1)  mmol/L


 


Chloride     ()  mmol/L


 


BUN     (7-17)  mg/dL


 


Creatinine     (0.52-1.04)  mg/dL


 


Glucose     (74-99)  mg/dL


 


POC Glucose (mg/dL)  408 H  339 H   ()  mg/dL


 


Hemoglobin A1c     (0.0-6.0)  %














  08/02/22 08/02/22 08/02/22 Range/Units





  20:11 22:11 22:27 


 


Sodium     (137-145)  mmol/L


 


Potassium     (3.5-5.1)  mmol/L


 


Chloride     ()  mmol/L


 


BUN     (7-17)  mg/dL


 


Creatinine     (0.52-1.04)  mg/dL


 


Glucose     (74-99)  mg/dL


 


POC Glucose (mg/dL)  171 H  51 L  59 L  ()  mg/dL


 


Hemoglobin A1c     (0.0-6.0)  %














  08/02/22 08/02/22 08/03/22 Range/Units





  22:47 23:33 00:31 


 


Sodium     (137-145)  mmol/L


 


Potassium     (3.5-5.1)  mmol/L


 


Chloride     ()  mmol/L


 


BUN     (7-17)  mg/dL


 


Creatinine     (0.52-1.04)  mg/dL


 


Glucose     (74-99)  mg/dL


 


POC Glucose (mg/dL)  278 H  257 H  216 H  ()  mg/dL


 


Hemoglobin A1c     (0.0-6.0)  %














  08/03/22 08/03/22 08/03/22 Range/Units





  00:59 01:58 03:05 


 


Sodium     (137-145)  mmol/L


 


Potassium     (3.5-5.1)  mmol/L


 


Chloride     ()  mmol/L


 


BUN     (7-17)  mg/dL


 


Creatinine     (0.52-1.04)  mg/dL


 


Glucose     (74-99)  mg/dL


 


POC Glucose (mg/dL)  148 H  112 H  113 H  ()  mg/dL


 


Hemoglobin A1c     (0.0-6.0)  %














  08/03/22 08/03/22 08/03/22 Range/Units





  05:02 07:01 08:11 


 


Sodium     (137-145)  mmol/L


 


Potassium     (3.5-5.1)  mmol/L


 


Chloride     ()  mmol/L


 


BUN     (7-17)  mg/dL


 


Creatinine     (0.52-1.04)  mg/dL


 


Glucose     (74-99)  mg/dL


 


POC Glucose (mg/dL)  167 H  205 H  236 H  ()  mg/dL


 


Hemoglobin A1c     (0.0-6.0)  %














  08/03/22 08/03/22 08/03/22 Range/Units





  08:28 09:17 10:29 


 


Sodium  148 H    (137-145)  mmol/L


 


Potassium  3.3 L    (3.5-5.1)  mmol/L


 


Chloride  115 H    ()  mmol/L


 


BUN  37 H    (7-17)  mg/dL


 


Creatinine  1.73 H    (0.52-1.04)  mg/dL


 


Glucose  191 H    (74-99)  mg/dL


 


POC Glucose (mg/dL)   211 H  265 H  ()  mg/dL


 


Hemoglobin A1c     (0.0-6.0)  %














Assessment and Plan


Assessment: 


Hypernatremia, hypovolemic, in a patient with decreased appetite with underlying

pancreatic cancer


Acute metabolic encephalopathy secondary to the above in addition to underlying 

dementia


Hyperglycemia, maintained on insulin drip


Acute renal failure, likely prerenal


chronic renal failure, stage IV


Hyperchloremia


Hypokalemia


Pressure ulcers, present on admission; bilateral heels stage I , coccyx stage II




History of stage IV pancreatic cancer status post ERCP, biliary stent placement 

at [facility].


COPD, not in exacerbation


Memory impairment/dementia


Hypertension


Hyperlipidemia


Previous history of smoking


No Code














Plan: Continue on current medication regime ,monitoring and symptomatic 

treatment.  Per Dr. Fox, oncology patient is appropriate for palliative care.  

Palliative care/hospice NP consulted.  IV fluids changed to D5W .  Insulin drip-

therefore requires transferred to 3 S. to accommodate.  Nephrology consult 

initiated.  Potassium replacement. Wound care. Prognosis poor, given multiple 

complex medical issues.  maintain supportive care.  No family at bedside at this

time.  Social work consulted to assist with discharge resources.








8/3/22 Patient is hospice appropriate at this time.Hospice NP reports  is

agreeable to palliative care only currently.  Dr. Huggins will contact 

spouse/family to update and discuss palliative care/hospice further, as they 

previously decided  with Dr. Fox to not proceed any further with radiation or 

chemotherapy treatments.











The impression and plan of care has been dictated as directed.





:


I performed a history and examination of this patient,  discussed the same with 

the dictator.  I agree with the dictator's note ,documented as a scribe.  Any 

additional findings or plans will be noted.

## 2022-08-03 NOTE — P.PN
Subjective


Progress Note Date: 08/03/22


Principal diagnosis: 


Dehydration





The patient is a 81 year old female with a past medical history significant of 

COPD, Dementia, HLD, HTN, renal disease, and pancreatic cancer.  Her  

brought her to the emergency center with concerns of generalized weakness and 

worsening confusion. She is afebrile, no vomiting.  No cough or dyspnea. 





8/2  The patient is sleeping in bed and appears comfortable.  Palliative care 

philosophies and services explained to her  whom is at the bedside.  He 

states that he has taken the patient to follow up [facility].  He does not recommend surgical interventions 

for her pancreatic cancer.  The patient has also followed up with oncology since

her last admission in June.  Her  states that have offered treatments, 

such as chemo and radiation.  However after consideration and speaking to his 

family, they have decided against any treatment.  Her  understands her 

prognosis and is prepared for her to decline.  However, he believes she is 

satisfied with her current qol.  He agreed to go home with OP palliative care 

services, and then transition to hospice when he feels it is appropriate.





Objective





- Vital Signs


Vital signs: 


                                   Vital Signs











Temp  98.3 F   08/03/22 04:00


 


Pulse  83   08/03/22 04:00


 


Resp  17   08/03/22 04:00


 


BP  118/76   08/03/22 04:00


 


Pulse Ox  98   08/03/22 04:00


 


FiO2      








                                 Intake & Output











 08/02/22 08/03/22 08/03/22





 18:59 06:59 18:59


 


Intake Total 4.667 33.258 0


 


Output Total  5 


 


Balance 4.667 28.258 0


 


Weight 52.163 kg  


 


Intake:   


 


  Intake, IV Titration 4.667 33.258 0





  Amount   


 


    Insulin Regular 100 unit 4.667 33.258 0





    In Sodium Chloride 0.9%   





    100 ml @ Titrate IV .Q0M   





    [facility] Rx#:[id number]   


 


Output:   


 


  Urine  5 


 


Other:   


 


  Voiding Method Diaper Diaper 





 Incontinent Incontinent 


 


  # Bowel Movements  1 














- Exam


General:  No acute distress. Frail and cachectic appearing.


HEENT: Head is atraumatic, normocephalic. Sclerae are clear. Pupils equal, round

and reactive to light bilaterally.  


CV: Heart regular in rate and rhythm positive S1 and S2. No clicks, rubs or 

murmurs.Peripheral pulses equal. 2/4


Lungs: Clear to auscultation bilaterally.  No wheezes rales or rhonchi. 

Respirations even and nonlabored. On RA


Abdomen/GI: Soft. Bowel sounds present in all 4 quadrants. Bowel sounds 

normoactive. No abdominal tenderness.  


Musculoskeletal/ Extremities: SANTOS, + generalized weakness 


Vascular: Radial pulses equal. 2/4, No peripheral edema


Skin: Warm and dry.No rash.


Neurologic: Alert and oriented x 1, to person only. + memory impairment, 


Psychiatric: Flat affect.








- Labs


CBC & Chem 7: 


                                 08/02/22 03:28





                                 08/03/22 08:28


Labs: 


                  Abnormal Lab Results - Last 24 Hours (Table)











  08/02/22 08/02/22 08/02/22 Range/Units





  03:28 03:28 08:01 


 


WBC  13.51 H    (4.50-10.00)  X 10*3/uL


 


RBC  3.65 L    (4.10-5.20)  X 10*6/uL


 


Hgb  10.6 L    (12.0-15.0)  g/dL


 


Hct  33.8 L    (37.2-46.3)  %


 


MCHC  31.4 L    (32.0-37.0)  g/dL


 


MPV  12.4 H    (9.5-12.2)  fL


 


Immature Gran #  0.19 H    (0.00-0.04)  X 10*3/uL


 


Neutrophils #  10.39 H    (1.80-7.70)  X 10*3/uL


 


Monocytes #  1.05 H    (0.20-1.00)  X 10*3/uL


 


Eosinophils #  0.02 L    (0.04-0.35)  X 10*3/uL


 


Basophils #  0.15 H    (0.00-0.10)  X 10*3/uL


 


Sodium   160 H   (135-145)  mmol/L


 


Potassium   3.4 L   (3.5-5.5)  mmol/L


 


Chloride   119 H   ()  mmol/L


 


BUN   43.4 H   (9.0-27.0)  mg/dL


 


Creatinine   2.0 H   (0.6-1.5)  mg/dL


 


Est GFR (CKD-EPI)AfAm   26.8 L   (60.0-200.0)   


 


Est GFR (CKD-EPI)NonAf   23.2 L   (60.0-200.0)   


 


BUN/Creatinine Ratio   21.70 H   (12.00-20.00)  Ratio


 


POC Glucose (mg/dL)     ()  mg/dL


 


Hemoglobin A1c    10.7 H  (0.0-6.0)  %














  08/02/22 08/02/22 08/02/22 Range/Units





  11:48 16:35 18:02 


 


WBC     (4.50-10.00)  X 10*3/uL


 


RBC     (4.10-5.20)  X 10*6/uL


 


Hgb     (12.0-15.0)  g/dL


 


Hct     (37.2-46.3)  %


 


MCHC     (32.0-37.0)  g/dL


 


MPV     (9.5-12.2)  fL


 


Immature Gran #     (0.00-0.04)  X 10*3/uL


 


Neutrophils #     (1.80-7.70)  X 10*3/uL


 


Monocytes #     (0.20-1.00)  X 10*3/uL


 


Eosinophils #     (0.04-0.35)  X 10*3/uL


 


Basophils #     (0.00-0.10)  X 10*3/uL


 


Sodium     (135-145)  mmol/L


 


Potassium     (3.5-5.5)  mmol/L


 


Chloride     ()  mmol/L


 


BUN     (9.0-27.0)  mg/dL


 


Creatinine     (0.6-1.5)  mg/dL


 


Est GFR (CKD-EPI)AfAm     (60.0-200.0)   


 


Est GFR (CKD-EPI)NonAf     (60.0-200.0)   


 


BUN/Creatinine Ratio     (12.00-20.00)  Ratio


 


POC Glucose (mg/dL)  243 H  432 H  408 H  ()  mg/dL


 


Hemoglobin A1c     (0.0-6.0)  %














  08/02/22 08/02/22 08/02/22 Range/Units





  19:03 19:47 20:11 


 


WBC     (4.50-10.00)  X 10*3/uL


 


RBC     (4.10-5.20)  X 10*6/uL


 


Hgb     (12.0-15.0)  g/dL


 


Hct     (37.2-46.3)  %


 


MCHC     (32.0-37.0)  g/dL


 


MPV     (9.5-12.2)  fL


 


Immature Gran #     (0.00-0.04)  X 10*3/uL


 


Neutrophils #     (1.80-7.70)  X 10*3/uL


 


Monocytes #     (0.20-1.00)  X 10*3/uL


 


Eosinophils #     (0.04-0.35)  X 10*3/uL


 


Basophils #     (0.00-0.10)  X 10*3/uL


 


Sodium   151 H   (135-145)  mmol/L


 


Potassium     (3.5-5.5)  mmol/L


 


Chloride     ()  mmol/L


 


BUN     (9.0-27.0)  mg/dL


 


Creatinine     (0.6-1.5)  mg/dL


 


Est GFR (CKD-EPI)AfAm     (60.0-200.0)   


 


Est GFR (CKD-EPI)NonAf     (60.0-200.0)   


 


BUN/Creatinine Ratio     (12.00-20.00)  Ratio


 


POC Glucose (mg/dL)  339 H   171 H  ()  mg/dL


 


Hemoglobin A1c     (0.0-6.0)  %














  08/02/22 08/02/22 08/02/22 Range/Units





  22:11 22:27 22:47 


 


WBC     (4.50-10.00)  X 10*3/uL


 


RBC     (4.10-5.20)  X 10*6/uL


 


Hgb     (12.0-15.0)  g/dL


 


Hct     (37.2-46.3)  %


 


MCHC     (32.0-37.0)  g/dL


 


MPV     (9.5-12.2)  fL


 


Immature Gran #     (0.00-0.04)  X 10*3/uL


 


Neutrophils #     (1.80-7.70)  X 10*3/uL


 


Monocytes #     (0.20-1.00)  X 10*3/uL


 


Eosinophils #     (0.04-0.35)  X 10*3/uL


 


Basophils #     (0.00-0.10)  X 10*3/uL


 


Sodium     (135-145)  mmol/L


 


Potassium     (3.5-5.5)  mmol/L


 


Chloride     ()  mmol/L


 


BUN     (9.0-27.0)  mg/dL


 


Creatinine     (0.6-1.5)  mg/dL


 


Est GFR (CKD-EPI)AfAm     (60.0-200.0)   


 


Est GFR (CKD-EPI)NonAf     (60.0-200.0)   


 


BUN/Creatinine Ratio     (12.00-20.00)  Ratio


 


POC Glucose (mg/dL)  51 L  59 L  278 H  ()  mg/dL


 


Hemoglobin A1c     (0.0-6.0)  %














  08/02/22 08/03/22 08/03/22 Range/Units





  23:33 00:31 00:59 


 


WBC     (4.50-10.00)  X 10*3/uL


 


RBC     (4.10-5.20)  X 10*6/uL


 


Hgb     (12.0-15.0)  g/dL


 


Hct     (37.2-46.3)  %


 


MCHC     (32.0-37.0)  g/dL


 


MPV     (9.5-12.2)  fL


 


Immature Gran #     (0.00-0.04)  X 10*3/uL


 


Neutrophils #     (1.80-7.70)  X 10*3/uL


 


Monocytes #     (0.20-1.00)  X 10*3/uL


 


Eosinophils #     (0.04-0.35)  X 10*3/uL


 


Basophils #     (0.00-0.10)  X 10*3/uL


 


Sodium     (135-145)  mmol/L


 


Potassium     (3.5-5.5)  mmol/L


 


Chloride     ()  mmol/L


 


BUN     (9.0-27.0)  mg/dL


 


Creatinine     (0.6-1.5)  mg/dL


 


Est GFR (CKD-EPI)AfAm     (60.0-200.0)   


 


Est GFR (CKD-EPI)NonAf     (60.0-200.0)   


 


BUN/Creatinine Ratio     (12.00-20.00)  Ratio


 


POC Glucose (mg/dL)  257 H  216 H  148 H  ()  mg/dL


 


Hemoglobin A1c     (0.0-6.0)  %














  08/03/22 08/03/22 08/03/22 Range/Units





  01:58 03:05 05:02 


 


WBC     (4.50-10.00)  X 10*3/uL


 


RBC     (4.10-5.20)  X 10*6/uL


 


Hgb     (12.0-15.0)  g/dL


 


Hct     (37.2-46.3)  %


 


MCHC     (32.0-37.0)  g/dL


 


MPV     (9.5-12.2)  fL


 


Immature Gran #     (0.00-0.04)  X 10*3/uL


 


Neutrophils #     (1.80-7.70)  X 10*3/uL


 


Monocytes #     (0.20-1.00)  X 10*3/uL


 


Eosinophils #     (0.04-0.35)  X 10*3/uL


 


Basophils #     (0.00-0.10)  X 10*3/uL


 


Sodium     (135-145)  mmol/L


 


Potassium     (3.5-5.5)  mmol/L


 


Chloride     ()  mmol/L


 


BUN     (9.0-27.0)  mg/dL


 


Creatinine     (0.6-1.5)  mg/dL


 


Est GFR (CKD-EPI)AfAm     (60.0-200.0)   


 


Est GFR (CKD-EPI)NonAf     (60.0-200.0)   


 


BUN/Creatinine Ratio     (12.00-20.00)  Ratio


 


POC Glucose (mg/dL)  112 H  113 H  167 H  ()  mg/dL


 


Hemoglobin A1c     (0.0-6.0)  %














  08/03/22 Range/Units





  07:01 


 


WBC   (4.50-10.00)  X 10*3/uL


 


RBC   (4.10-5.20)  X 10*6/uL


 


Hgb   (12.0-15.0)  g/dL


 


Hct   (37.2-46.3)  %


 


MCHC   (32.0-37.0)  g/dL


 


MPV   (9.5-12.2)  fL


 


Immature Gran #   (0.00-0.04)  X 10*3/uL


 


Neutrophils #   (1.80-7.70)  X 10*3/uL


 


Monocytes #   (0.20-1.00)  X 10*3/uL


 


Eosinophils #   (0.04-0.35)  X 10*3/uL


 


Basophils #   (0.00-0.10)  X 10*3/uL


 


Sodium   (135-145)  mmol/L


 


Potassium   (3.5-5.5)  mmol/L


 


Chloride   ()  mmol/L


 


BUN   (9.0-27.0)  mg/dL


 


Creatinine   (0.6-1.5)  mg/dL


 


Est GFR (CKD-EPI)AfAm   (60.0-200.0)   


 


Est GFR (CKD-EPI)NonAf   (60.0-200.0)   


 


BUN/Creatinine Ratio   (12.00-20.00)  Ratio


 


POC Glucose (mg/dL)  205 H  ()  mg/dL


 


Hemoglobin A1c   (0.0-6.0)  %














Assessment and Plan


Assessment: 





Symptoms


* Pain - 0/10, continue Tylenol prn


* Fatigue - + generalized weakness and fatigue, + daytime sleepiness, continue 

  Feosol


* SOB - No, on RA


* Insomnia - No 


* N/V - No


* Anxiety - Continue Lexapro


* Depression - Continue Wellbutrin


* Confusion - Yes, continue Namenda


* Agitation - No


* Hallucinations - No


* Appetite/weight loss - + decrease in appetite, no recent weight loss. Continue

  Carbohydrate consistent diet with Franklyn BIDWM. Encourage oral intake


* Dysphagia -No


* Constipation - No, LBM 8/3


* Incontinence - Yes, wears briefs


* Itch - No














Plan: 


Summary/Goals - The patient is sleeping in bed and appears comfortable. 

Education provided regarding her pancreatic cancer and prognosis. Her  

states that he is hesitant to put her in hospice because he would not be able to

bring her back to the hospital if she needs treatment for dehydration again. He 

states he will continue to talk to the family and transition to hospice when her

status declines.  He would like to take her home upon discharge and agrees with 

receiving palliative care as outpatient. Emotional support provided.





Recommendations - Discharge home with visiting nurse and OP palliative care when

medically stable.





Advanced Directives - None on file, information provided





Code Status - DNR





Thank you for this consult





Nicci Cowan [facility]


Palliative Care


[facility] [id number]


Email: [email or web address]








Time with Patient: Less than 30

## 2022-08-03 NOTE — US
EXAMINATION TYPE: US kidneys/renal and bladder

 

DATE OF EXAM: 8/3/2022

 

COMPARISON:

 

CLINICAL HISTORY: zheng. abnormal labs.  AMS.  Pancreatic cancer. 

 

EXAM MEASUREMENTS:

 

Right Kidney:  9.3 x 5.0 x 4.0 cm

Left Kidney: Unable to estimate size due to bowel gas 

 

***Limited due to bowel gas and patient unable to turn for better ultrasound window/images***

 

Right Kidney: No hydronephrosis or masses seen  

Left Kidney: Very limited due to bowel gas.  Upper and lower poles obscured by overlying bowel gas.  
 

Bladder: moderately distended, anechoic

**Bilateral Jets seen

 

 

IMPRESSION:

 

1. Exam is limited due to bowel gas.

2. Portions of the right kidney visualized appears normal.

3. Exam is limited to the kidneys, the pancreas is not evaluated at this time.

## 2022-08-04 LAB
ANION GAP SERPL CALC-SCNC: 4 MMOL/L
BUN SERPL-SCNC: 38 MG/DL (ref 7–17)
CALCIUM SPEC-MCNC: 8.7 MG/DL (ref 8.4–10.2)
CHLORIDE SERPL-SCNC: 105 MMOL/L (ref 98–107)
CO2 SERPL-SCNC: 25 MMOL/L (ref 22–30)
GLUCOSE BLD-MCNC: 104 MG/DL (ref 70–110)
GLUCOSE BLD-MCNC: 107 MG/DL (ref 70–110)
GLUCOSE BLD-MCNC: 134 MG/DL (ref 70–110)
GLUCOSE BLD-MCNC: 135 MG/DL (ref 70–110)
GLUCOSE BLD-MCNC: 148 MG/DL (ref 70–110)
GLUCOSE BLD-MCNC: 154 MG/DL (ref 70–110)
GLUCOSE BLD-MCNC: 159 MG/DL (ref 70–110)
GLUCOSE BLD-MCNC: 161 MG/DL (ref 70–110)
GLUCOSE BLD-MCNC: 161 MG/DL (ref 70–110)
GLUCOSE BLD-MCNC: 165 MG/DL (ref 70–110)
GLUCOSE BLD-MCNC: 174 MG/DL (ref 70–110)
GLUCOSE BLD-MCNC: 176 MG/DL (ref 70–110)
GLUCOSE BLD-MCNC: 202 MG/DL (ref 70–110)
GLUCOSE BLD-MCNC: 210 MG/DL (ref 70–110)
GLUCOSE BLD-MCNC: 210 MG/DL (ref 70–110)
GLUCOSE BLD-MCNC: 222 MG/DL (ref 70–110)
GLUCOSE BLD-MCNC: 264 MG/DL (ref 70–110)
GLUCOSE BLD-MCNC: 267 MG/DL (ref 70–110)
GLUCOSE BLD-MCNC: 288 MG/DL (ref 70–110)
GLUCOSE BLD-MCNC: 313 MG/DL (ref 70–110)
GLUCOSE SERPL-MCNC: 182 MG/DL (ref 74–99)
MAGNESIUM SPEC-SCNC: 1.7 MG/DL (ref 1.6–2.3)
POTASSIUM SERPL-SCNC: 4 MMOL/L (ref 3.5–5.1)
SODIUM SERPL-SCNC: 134 MMOL/L (ref 137–145)

## 2022-08-04 RX ADMIN — BUPROPION HYDROCHLORIDE SCH MG: 150 TABLET, FILM COATED, EXTENDED RELEASE ORAL at 08:30

## 2022-08-04 RX ADMIN — ESCITALOPRAM SCH MG: 5 TABLET, FILM COATED ORAL at 08:30

## 2022-08-04 RX ADMIN — Medication SCH MG: at 08:30

## 2022-08-04 RX ADMIN — CLOPIDOGREL BISULFATE SCH MG: 75 TABLET ORAL at 08:30

## 2022-08-04 RX ADMIN — MEMANTINE HYDROCHLORIDE SCH MG: 10 TABLET ORAL at 08:30

## 2022-08-04 RX ADMIN — ASPIRIN 81 MG CHEWABLE TABLET SCH MG: 81 TABLET CHEWABLE at 08:30

## 2022-08-04 RX ADMIN — INSULIN HUMAN SCH MLS/HR: 100 INJECTION, SOLUTION PARENTERAL at 08:35

## 2022-08-04 RX ADMIN — MEMANTINE HYDROCHLORIDE SCH MG: 10 TABLET ORAL at 20:52

## 2022-08-04 RX ADMIN — CEFAZOLIN SCH MLS/HR: 330 INJECTION, POWDER, FOR SOLUTION INTRAMUSCULAR; INTRAVENOUS at 11:35

## 2022-08-04 RX ADMIN — ATORVASTATIN CALCIUM SCH MG: 20 TABLET, FILM COATED ORAL at 08:30

## 2022-08-04 NOTE — P.PN
Subjective


Progress Note Date: 08/04/22


This is a 81-year-old female with past medical history of stage IV pancreatic 

cancer, stage IV chronic kidney disease and multiple other medical issues 

brought into the ER by  for increased weakness and confusion and multiple

other medical issues.  Vague historian, currently no family at bedside. Denies 

nausea, vomiting or diarrhea.  Denies abdominal pain.  Denies chest pain, 

palpitations or shortness of breath.  Denies pain.  On admission hypernatremic, 

sodium 152, potassium 3.5, lactic acid 3.6, afebrile, normal WBC, chloride 111, 

bicarb 29, BUN 47, creatinine 1.95, blood sugars in the 400s, alk phos 191, UA 

reporting 4+ glucose, chest x-ray reporting mild chronic progression multiple 

changes bilaterally, symmetric 1 cm densities over bilateral upper lungs not 

clearly identified nodules on recent CT, presumed external to the patient, 

square in shape, cardiac silhouette stable, within normal limits with a 

atherosclerotic change aortic knob.  IV fluid hydration initiated.  Currently re

rene afebrile, WBC increased to 13.5, hemoglobin 10.6, platelets 223, sodium 

worsening up to 160, potassium 3.4, chloride 119 creatinine 2, blood sugars in 

the low 100s, lactic acid decreased to 1.3.  Staff reports Dr. Fox, Oncology, 

met with family last week and they chose no further treatment with radiation, 

chemotherapy and the patient is appropriate for palliative care.





08/03/2022 sitting up in bed, poor diet intake.  Denies pain.  Sodium decreased 

to 148 on D5W.  Renal function slowly improving.  Blood sugars better controlled

on insulin drip currently in the 190s.  Hemoglobin A1c 10.7  Receiving potassium

supplements for potassium 3.3.  Vital signs stable, maintaining O2 sats in the 

high 90s on room air.   is agreeable to palliative care only, as reported

per hospice NP.  No family at bedside this morning.





08/04/2022 continues on D5W, sodium improving down to 134, renal function 

improving, BUN 38, creatinine 1.25.  Requiring insulin drip, blood sugars in the

200s.  Magnesium 1.7.


Denies chest pain, palpitations or shortness of breath.  Denies pain.  Diet 

intake remains poor, 25% this morning for breakfast. 





Objective





- Vital Signs


Vital signs: 


                                   Vital Signs











Temp  98.0 F   08/04/22 11:55


 


Pulse  56 L  08/04/22 11:55


 


Resp  18   08/04/22 11:55


 


BP  134/79   08/04/22 11:55


 


Pulse Ox  97   08/04/22 11:55


 


FiO2      








                                 Intake & Output











 08/03/22 08/04/22 08/04/22





 18:59 06:59 18:59


 


Intake Total 250.607 257.738 246.602


 


Output Total 100 700 


 


Balance 150.607 -442.262 246.602


 


Intake:   


 


  Intake, IV Titration 40.607 17.738 6.602





  Amount   


 


    Insulin Regular 100 unit 40.607 17.738 6.602





    In Sodium Chloride 0.9%   





    100 ml @ Titrate IV .Q0M   





    [facility] Rx#:[id number]   


 


  Oral 210 240 240


 


Output:   


 


  Urine 100 700 


 


Other:   


 


  Voiding Method Diaper External Catheter External Catheter





 Incontinent  














- Exam


PHYSICAL EXAM:


VITAL SIGNS: As above


GENERAL: Cachectic,Sitting up in bed, no acute distress, pleasantly confused, s

miling, alert and oriented 1.


HEENT: Head atraumatic, normocephalic. Conjunctivae normal. eyes normal.  No 

sclera icterus.


NECK: Supple, No JVD.


CARDIOVASCULAR:  S1, S2 regular. No murmur


RESPIRATION: Unlabored, Breath sounds diminished in the bases. 


ABDOMEN:  Soft,  nontender . No guarding. no masses palpable. Bowel sounds 

heard.


LEGS:  No edema. no swelling 


NERVOUS SYSTEM: Limited exam; Alert and oriented X1 to person, baseline, 

pleasant, cooperative.Diffuse weakness.


Skin: Warm and dry, bilateral heels stage I , coccyx X 2, stage II -refer to 

nursing measurements.











- Labs


CBC & Chem 7: 


                                 08/02/22 03:28





                                 08/04/22 08:48


Labs: 


                  Abnormal Lab Results - Last 24 Hours (Table)











  08/03/22 08/03/22 08/03/22 Range/Units





  12:47 13:30 14:37 


 


Sodium     (137-145)  mmol/L


 


BUN     (7-17)  mg/dL


 


Creatinine     (0.52-1.04)  mg/dL


 


Glucose     (74-99)  mg/dL


 


POC Glucose (mg/dL)  273 H  291 H  298 H  ()  mg/dL














  08/03/22 08/03/22 08/03/22 Range/Units





  15:37 16:43 17:44 


 


Sodium     (137-145)  mmol/L


 


BUN     (7-17)  mg/dL


 


Creatinine     (0.52-1.04)  mg/dL


 


Glucose     (74-99)  mg/dL


 


POC Glucose (mg/dL)  303 H  287 H  176 H  ()  mg/dL














  08/03/22 08/03/22 08/03/22 Range/Units





  18:25 21:45 23:01 


 


Sodium     (137-145)  mmol/L


 


BUN     (7-17)  mg/dL


 


Creatinine     (0.52-1.04)  mg/dL


 


Glucose     (74-99)  mg/dL


 


POC Glucose (mg/dL)  169 H  224 H  231 H  ()  mg/dL














  08/04/22 08/04/22 08/04/22 Range/Units





  00:07 01:08 04:57 


 


Sodium     (137-145)  mmol/L


 


BUN     (7-17)  mg/dL


 


Creatinine     (0.52-1.04)  mg/dL


 


Glucose     (74-99)  mg/dL


 


POC Glucose (mg/dL)  165 H  154 H  134 H  ()  mg/dL














  08/04/22 08/04/22 08/04/22 Range/Units





  06:58 08:04 08:48 


 


Sodium    134 L  (137-145)  mmol/L


 


BUN    38 H  (7-17)  mg/dL


 


Creatinine    1.24 H  (0.52-1.04)  mg/dL


 


Glucose    182 H  (74-99)  mg/dL


 


POC Glucose (mg/dL)  222 H  210 H   ()  mg/dL














  08/04/22 08/04/22 08/04/22 Range/Units





  09:07 09:57 11:05 


 


Sodium     (137-145)  mmol/L


 


BUN     (7-17)  mg/dL


 


Creatinine     (0.52-1.04)  mg/dL


 


Glucose     (74-99)  mg/dL


 


POC Glucose (mg/dL)  202 H  161 H  148 H  ()  mg/dL














  08/04/22 Range/Units





  12:05 


 


Sodium   (137-145)  mmol/L


 


BUN   (7-17)  mg/dL


 


Creatinine   (0.52-1.04)  mg/dL


 


Glucose   (74-99)  mg/dL


 


POC Glucose (mg/dL)  174 H  ()  mg/dL














Assessment and Plan


Assessment: 


Hypernatremia, hypovolemic, in a patient with decreased appetite with underlying

pancreatic cancer


Acute metabolic encephalopathy secondary to the above in addition to underlying 

dementia


Acute renal failure, likely prerenal


chronic renal failure, stage IV


Hyperchloremia


Hypokalemia


Pressure ulcers, present on admission; bilateral heels stage I , coccyx stage II




Moderate protein calorie malnutrition


History of stage IV pancreatic cancer status post ERCP, biliary stent placement 

at [facility].


Diabetes mellitus, hemoglobin A1c 10.7 secondary to the above, hyperglycemia, 

maintained on insulin drip


COPD, not in exacerbation


Memory impairment/dementia


Hypertension


Hyperlipidemia


Previous history of smoking


No Code














Plan: Continue on current medication regime ,monitoring and symptomatic 

treatment.  Gentle IV fluid hydration, insulin drip -change Accu-Cheks to every 

2 hours .per hospice NP, spouse is open to hospice informational meeting 

.Prognosis poor, given multiple complex medical issues.  maintain supportive 

care.

















The impression and plan of care has been dictated as directed.





:


I performed a history and examination of this patient,  discussed the same with 

the dictator.  I agree with the dictator's note ,documented as a scribe.  Any 

additional findings or plans will be noted.

## 2022-08-04 NOTE — P.PN
Subjective


Progress Note Date: 08/04/22


Principal diagnosis: 


Dehydration





The patient is a 81 year old female with a past medical history significant of 

COPD, Dementia, HLD, HTN, renal disease, and pancreatic cancer.  Her  

brought her to the emergency center with concerns of generalized weakness and 

worsening confusion. She is afebrile, no vomiting.  No cough or dyspnea. 





8/2  The patient is sleeping in bed and appears comfortable.  Palliative care 

philosophies and services explained to her  whom is at the bedside.  He 

states that he has taken the patient to follow up with the surgeon from [facility] who biopsied the pancreas.  He does not recommend surgical interventions 

for her pancreatic cancer.  The patient has also followed up with oncology since

her last admission in June.  Her  states that have offered treatments, 

such as chemo and radiation.  However after consideration and speaking to his 

family, they have decided against any treatment.  Her  understands her 

prognosis and is prepared for her to decline.  However, he believes she is 

satisfied with her current qol.  He agreed to go home with OP palliative care 

services, and then transition to hospice when he feels it is appropriate.





8/3  The patient is sleeping in bed and appears comfortable. Education provided 

regarding her pancreatic cancer and prognosis. Her  states that he is 

hesitant to put her in hospice because he would not be able to bring her back to

the hospital if she needs treatment for dehydration again. He states he will 

continue to talk to the family and transition to hospice when her status 

declines.  He would like to take her home upon discharge and agrees with 

receiving palliative care as outpatient. Emotional support provided.





Objective





- Vital Signs


Vital signs: 


                                   Vital Signs











Temp  98 F   08/04/22 08:29


 


Pulse  71   08/04/22 08:29


 


Resp  18   08/04/22 08:29


 


BP  113/71   08/04/22 08:29


 


Pulse Ox  98   08/04/22 08:29


 


FiO2      








                                 Intake & Output











 08/03/22 08/04/22 08/04/22





 18:59 06:59 18:59


 


Intake Total 250.607 257.738 246.602


 


Output Total 100 700 


 


Balance 150.607 -442.262 246.602


 


Intake:   


 


  Intake, IV Titration 40.607 17.738 6.602





  Amount   


 


    Insulin Regular 100 unit 40.607 17.738 6.602





    In Sodium Chloride 0.9%   





    100 ml @ Titrate IV .Q0M   





    [facility] Rx#:[id number]   


 


  Oral 210 240 240


 


Output:   


 


  Urine 100 700 


 


Other:   


 


  Voiding Method Diaper External Catheter External Catheter





 Incontinent  














- Exam


General:  Lethargic. No acute distress. Frail and cachectic appearing.


HEENT: Head is atraumatic, normocephalic. Sclerae are clear. Pupils equal, round

and reactive to light bilaterally.  


CV: Heart regular in rate and rhythm positive S1 and S2. No clicks, rubs or 

murmurs.Peripheral pulses equal. 2/4


Lungs: Clear to auscultation bilaterally.  No wheezes rales or rhonchi. 

Respirations even and nonlabored. On RA


Abdomen/GI: Soft. Bowel sounds present in all 4 quadrants. Bowel sounds 

normoactive. No abdominal tenderness.  


Musculoskeletal/ Extremities: SANTOS, + generalized weakness 


Vascular: Radial pulses equal. 2/4, No peripheral edema


Skin: Warm and dry.No rash.


Neurologic: Alert and oriented x 1, to person only. + memory impairment


Psychiatric: Flat affect.








- Labs


CBC & Chem 7: 


                                 08/02/22 03:28





                                 08/04/22 08:48


Labs: 


                  Abnormal Lab Results - Last 24 Hours (Table)











  08/03/22 08/03/22 08/03/22 Range/Units





  11:36 12:47 13:30 


 


Sodium     (137-145)  mmol/L


 


BUN     (7-17)  mg/dL


 


Creatinine     (0.52-1.04)  mg/dL


 


Glucose     (74-99)  mg/dL


 


POC Glucose (mg/dL)  225 H  273 H  291 H  ()  mg/dL














  08/03/22 08/03/22 08/03/22 Range/Units





  14:37 15:37 16:43 


 


Sodium     (137-145)  mmol/L


 


BUN     (7-17)  mg/dL


 


Creatinine     (0.52-1.04)  mg/dL


 


Glucose     (74-99)  mg/dL


 


POC Glucose (mg/dL)  298 H  303 H  287 H  ()  mg/dL














  08/03/22 08/03/22 08/03/22 Range/Units





  17:44 18:25 21:45 


 


Sodium     (137-145)  mmol/L


 


BUN     (7-17)  mg/dL


 


Creatinine     (0.52-1.04)  mg/dL


 


Glucose     (74-99)  mg/dL


 


POC Glucose (mg/dL)  176 H  169 H  224 H  ()  mg/dL














  08/03/22 08/04/22 08/04/22 Range/Units





  23:01 00:07 01:08 


 


Sodium     (137-145)  mmol/L


 


BUN     (7-17)  mg/dL


 


Creatinine     (0.52-1.04)  mg/dL


 


Glucose     (74-99)  mg/dL


 


POC Glucose (mg/dL)  231 H  165 H  154 H  ()  mg/dL














  08/04/22 08/04/22 08/04/22 Range/Units





  04:57 06:58 08:04 


 


Sodium     (137-145)  mmol/L


 


BUN     (7-17)  mg/dL


 


Creatinine     (0.52-1.04)  mg/dL


 


Glucose     (74-99)  mg/dL


 


POC Glucose (mg/dL)  134 H  222 H  210 H  ()  mg/dL














  08/04/22 08/04/22 08/04/22 Range/Units





  08:48 09:07 09:57 


 


Sodium  134 L    (137-145)  mmol/L


 


BUN  38 H    (7-17)  mg/dL


 


Creatinine  1.24 H    (0.52-1.04)  mg/dL


 


Glucose  182 H    (74-99)  mg/dL


 


POC Glucose (mg/dL)   202 H  161 H  ()  mg/dL














  08/04/22 Range/Units





  11:05 


 


Sodium   (137-145)  mmol/L


 


BUN   (7-17)  mg/dL


 


Creatinine   (0.52-1.04)  mg/dL


 


Glucose   (74-99)  mg/dL


 


POC Glucose (mg/dL)  148 H  ()  mg/dL














Assessment and Plan


Assessment: 





Symptoms


* Pain - 0/10, continue Tylenol prn


* Fatigue - + generalized weakness and fatigue, + daytime sleepiness, continue 

  Feosol


* SOB - No, on RA


* Insomnia - No 


* N/V - No


* Anxiety - Continue Lexapro


* Depression - Continue Wellbutrin


* Confusion - Yes, continue Namenda


* Agitation - No


* Hallucinations - No


* Appetite/weight loss - + decrease in appetite, no recent weight loss. Continue

  Carbohydrate consistent diet with Franklyn BIDWM. Encourage oral intake


* Dysphagia -No


* Constipation - No, LBM 8/3


* Incontinence - Yes, has external catheter


* Itch - No














Plan: 


Summary/Goals - The patient is lethargic and resting in bed.  Her , Kevin 

is at the bedside.  He is worried that he might have to check the patient's b

lood sugar at home.  He states they sent him home with a glucometer last 

admission, but has not used it because he does not know how.  Requested Kevin 

bring in the glucometer from home so we can get him comfortable with using it.  

He stated he talked to her sister yesterday.  She agrees with the plan to take 

her home with palliative care and then transition to hospice when her condition 

deteriorates.  He did agree to a hospice informational meeting.  He will then 

have and understanding about their philosophies and services.  He will have all 

the information needed to make decisions in the future.  He is still resistant 

to hospice because he would not be able to bring her back to the hospital if she

needs treatment for dehydration again.





Recommendations - Discharge home with visiting nurse and OP palliative care when

medically stable.





Advanced Directives - None on file, information provided





Code Status - DNR





Thank you for this consult





Nicci Cowan [facility]


Palliative Care


[facility] [id number]


Email: [email or web address]








Time with Patient: Less than 30

## 2022-08-04 NOTE — CDI
Documentation Clarification Form



Date: 08/04/2022 11:04:21 AM

From: Nan Rodriguez RN CCDS

Phone: [phone]

MRN: [id number]

Admit Date: 08/01/2022 01:45:00 PM

Patient Name: No Means

Visit Number: [id number]

Discharge Date:  





ATTENTION: The Clinical Documentation Specialists (CDI) and [facility] Coding Staff 
appreciate your assistance in clarifying documentation. Please respond to the 
clarification below the line at the bottom and electronically sign. The CDI & 
[facility] Coding staff will review the response and follow-up if needed. Please note: 
Queries are made part of the Legal Health Record. If you have any questions, 
please contact the author of this message via ITS.



Dr. Maximilian Huggins



The patient is being described as cachectic, diffuse weakness with bilateral 
heel ulcers stage one and coccyx ulcer stage 2.   Based on this information and 
the findings below, is there an additional diagnosis that is clinically 
appropriate for this patient?



History/Risk Factors: 81-year-old female presents to the ED for evaluation of 
confusion, concern for dehydration and generalized weakness. Medical History: 
Dementia, Recurrent pancreatic cancer and renal disease. ED Note, 8/1.



Clinical Indicators:  

Admitting diagnosis, 8/2: Hypernatremia, hypovolemic, pancreatic cancer, 
hyperglycemic, CKD IV; pressure ulcers heels and coccyx and TIMUR with ATN.

RD Consult Assessment: see below

   Nutritional assessment: Fair, 25-50% consumed, appetite fair.

   Current BMI: 21.0kg; Hgt 5ft 2inches; Estimated wgt by family 52.163kg

    Physical findings: Bilateral heel ulcers stage one and Coccyx ulcer stage 
two.

             8/2 Franklyn PO BID

   

Treatment: 8/2 Carbohydrate modified diet; Consistent carbohydrate diet; 
commercial beverage Franklyn BID.  Monitor blood glucose levels 

Dietary Consult: see above 



Is there an additional diagnosis that is clinically appropriate for this 
patient?



[  ]  Moderate Protein-Calorie Malnutrition

[  ]  Severe Protein-Calorie Malnutrition

[  ]  Other condition, please specify ___________________

[  ]  Unable to Determine



(Template Last Revised: March 2021)



8/5 Medicine progress note Melba ROMERO/ Dr Joseluis Patel pcm.

MTDD

## 2022-08-04 NOTE — P.PN
Subjective





Patient is seen in follow-up for hyponatremia and acute kidney injury.  

Currently on D5W.  Patient has advanced dementia and is not a reliable histo

mariya.  Oral intake poor.  Blood pressure stable.





Vital signs are stable.


General: Awake.  No acute distress.


HEENT: Head exam is unremarkable.


LUNGS: Breath sounds decreased.


HEART: Rate and Rhythm are regular. 


ABDOMEN: Soft, no distention.


EXTREMITITES: No edema.





Objective





- Vital Signs


Vital signs: 


                                   Vital Signs











Temp  98 F   08/04/22 08:29


 


Pulse  71   08/04/22 08:29


 


Resp  18   08/04/22 08:29


 


BP  113/71   08/04/22 08:29


 


Pulse Ox  98   08/04/22 08:29


 


FiO2      








                                 Intake & Output











 08/03/22 08/04/22 08/04/22





 18:59 06:59 18:59


 


Intake Total 250.607 257.738 1.712


 


Output Total 100 700 


 


Balance 150.607 -442.262 1.712


 


Intake:   


 


  Intake, IV Titration 40.607 17.738 1.712





  Amount   


 


    Insulin Regular 100 unit 40.607 17.738 1.712





    In Sodium Chloride 0.9%   





    100 ml @ Titrate IV .Q0M   





    [facility] Rx#:[id number]   


 


  Oral 210 240 


 


Output:   


 


  Urine 100 700 


 


Other:   


 


  Voiding Method Diaper External Catheter 





 Incontinent  














- Labs


CBC & Chem 7: 


                                 08/02/22 03:28





                                 08/03/22 08:28


Labs: 


                  Abnormal Lab Results - Last 24 Hours (Table)











  08/03/22 08/03/22 08/03/22 Range/Units





  08:28 09:17 10:29 


 


Sodium  148 H    (137-145)  mmol/L


 


Potassium  3.3 L    (3.5-5.1)  mmol/L


 


Chloride  115 H    ()  mmol/L


 


BUN  37 H    (7-17)  mg/dL


 


Creatinine  1.73 H    (0.52-1.04)  mg/dL


 


Glucose  191 H    (74-99)  mg/dL


 


POC Glucose (mg/dL)   211 H  265 H  ()  mg/dL














  08/03/22 08/03/22 08/03/22 Range/Units





  11:36 12:47 13:30 


 


Sodium     (137-145)  mmol/L


 


Potassium     (3.5-5.1)  mmol/L


 


Chloride     ()  mmol/L


 


BUN     (7-17)  mg/dL


 


Creatinine     (0.52-1.04)  mg/dL


 


Glucose     (74-99)  mg/dL


 


POC Glucose (mg/dL)  225 H  273 H  291 H  ()  mg/dL














  08/03/22 08/03/22 08/03/22 Range/Units





  14:37 15:37 16:43 


 


Sodium     (137-145)  mmol/L


 


Potassium     (3.5-5.1)  mmol/L


 


Chloride     ()  mmol/L


 


BUN     (7-17)  mg/dL


 


Creatinine     (0.52-1.04)  mg/dL


 


Glucose     (74-99)  mg/dL


 


POC Glucose (mg/dL)  298 H  303 H  287 H  ()  mg/dL














  08/03/22 08/03/22 08/03/22 Range/Units





  17:44 18:25 21:45 


 


Sodium     (137-145)  mmol/L


 


Potassium     (3.5-5.1)  mmol/L


 


Chloride     ()  mmol/L


 


BUN     (7-17)  mg/dL


 


Creatinine     (0.52-1.04)  mg/dL


 


Glucose     (74-99)  mg/dL


 


POC Glucose (mg/dL)  176 H  169 H  224 H  ()  mg/dL














  08/03/22 08/04/22 08/04/22 Range/Units





  23:01 00:07 01:08 


 


Sodium     (137-145)  mmol/L


 


Potassium     (3.5-5.1)  mmol/L


 


Chloride     ()  mmol/L


 


BUN     (7-17)  mg/dL


 


Creatinine     (0.52-1.04)  mg/dL


 


Glucose     (74-99)  mg/dL


 


POC Glucose (mg/dL)  231 H  165 H  154 H  ()  mg/dL














  08/04/22 08/04/22 08/04/22 Range/Units





  04:57 06:58 08:04 


 


Sodium     (137-145)  mmol/L


 


Potassium     (3.5-5.1)  mmol/L


 


Chloride     ()  mmol/L


 


BUN     (7-17)  mg/dL


 


Creatinine     (0.52-1.04)  mg/dL


 


Glucose     (74-99)  mg/dL


 


POC Glucose (mg/dL)  134 H  222 H  210 H  ()  mg/dL














Assessment and Plan


Plan: 





Assessment:


1.  Acute kidney injury secondary to ATN secondary to hypovolemic from 

hyerpglycemia, poor intake and diuresis.  Creatinine trending down - 1.73 

yesterday.  Creatinine in May 2022 was in the range of 1-1.1.  No proteinuria on

UA.  No hydronephrosis noted in right kidney.  Left kidney not visualized due to

bowel gas.


2.  Hypernatremia from lack of oral water intake.


3.  Pancreatic cancer.


4.  Benign hypertension.  Blood pressure stable.


5.  Dementia.  


6.  Hypokalemia from poor intake and diuresis.  Replaced.


7.  Hyperglycemia maintained on insulin drip.  A1c 10.7 this admission.





Plan:


Maintain D5W.


Avoid nephrotoxins.


Encourage oral intake, including free water.


Stop amlodipine.


Follow-up morning labs.

## 2022-08-05 LAB
ANION GAP SERPL CALC-SCNC: 5 MMOL/L
BUN SERPL-SCNC: 29 MG/DL (ref 7–17)
CALCIUM SPEC-MCNC: 8.4 MG/DL (ref 8.4–10.2)
CHLORIDE SERPL-SCNC: 109 MMOL/L (ref 98–107)
CO2 SERPL-SCNC: 24 MMOL/L (ref 22–30)
GLUCOSE BLD-MCNC: 116 MG/DL (ref 70–110)
GLUCOSE BLD-MCNC: 119 MG/DL (ref 70–110)
GLUCOSE BLD-MCNC: 151 MG/DL (ref 70–110)
GLUCOSE BLD-MCNC: 159 MG/DL (ref 70–110)
GLUCOSE BLD-MCNC: 175 MG/DL (ref 70–110)
GLUCOSE BLD-MCNC: 188 MG/DL (ref 70–110)
GLUCOSE BLD-MCNC: 207 MG/DL (ref 70–110)
GLUCOSE BLD-MCNC: 208 MG/DL (ref 70–110)
GLUCOSE BLD-MCNC: 211 MG/DL (ref 70–110)
GLUCOSE BLD-MCNC: 232 MG/DL (ref 70–110)
GLUCOSE BLD-MCNC: 248 MG/DL (ref 70–110)
GLUCOSE BLD-MCNC: 262 MG/DL (ref 70–110)
GLUCOSE BLD-MCNC: 386 MG/DL (ref 70–110)
GLUCOSE BLD-MCNC: 409 MG/DL (ref 70–110)
GLUCOSE BLD-MCNC: 93 MG/DL (ref 70–110)
GLUCOSE SERPL-MCNC: 194 MG/DL (ref 74–99)
MAGNESIUM SPEC-SCNC: 1.8 MG/DL (ref 1.6–2.3)
POTASSIUM SERPL-SCNC: 3.6 MMOL/L (ref 3.5–5.1)
SODIUM SERPL-SCNC: 138 MMOL/L (ref 137–145)

## 2022-08-05 RX ADMIN — ASPIRIN 81 MG CHEWABLE TABLET SCH MG: 81 TABLET CHEWABLE at 08:32

## 2022-08-05 RX ADMIN — MEMANTINE HYDROCHLORIDE SCH MG: 10 TABLET ORAL at 08:33

## 2022-08-05 RX ADMIN — CEFAZOLIN SCH: 330 INJECTION, POWDER, FOR SOLUTION INTRAMUSCULAR; INTRAVENOUS at 17:52

## 2022-08-05 RX ADMIN — INSULIN ASPART SCH UNIT: 100 INJECTION, SOLUTION INTRAVENOUS; SUBCUTANEOUS at 17:41

## 2022-08-05 RX ADMIN — ESCITALOPRAM SCH MG: 5 TABLET, FILM COATED ORAL at 08:33

## 2022-08-05 RX ADMIN — INSULIN ASPART SCH UNIT: 100 INJECTION, SOLUTION INTRAVENOUS; SUBCUTANEOUS at 20:49

## 2022-08-05 RX ADMIN — ATORVASTATIN CALCIUM SCH MG: 20 TABLET, FILM COATED ORAL at 08:33

## 2022-08-05 RX ADMIN — CLOPIDOGREL BISULFATE SCH MG: 75 TABLET ORAL at 08:33

## 2022-08-05 RX ADMIN — MEMANTINE HYDROCHLORIDE SCH MG: 10 TABLET ORAL at 20:49

## 2022-08-05 RX ADMIN — BUPROPION HYDROCHLORIDE SCH MG: 150 TABLET, FILM COATED, EXTENDED RELEASE ORAL at 08:33

## 2022-08-05 RX ADMIN — Medication SCH MG: at 08:33

## 2022-08-05 NOTE — P.PN
Subjective





Patient is seen in follow-up for hyponatremia and acute kidney injury.  

Currently on normal saline.  Morning labs pending.  Patient has advanced dem

entia and is not a reliable historian.  Oral intake poor.  Blood pressure 

stable.





Vital signs are stable.


General: Awake.  No acute distress.


HEENT: Head exam is unremarkable.


LUNGS: Breath sounds decreased.


HEART: Rate and Rhythm are regular. 


ABDOMEN: Soft, no distention.


EXTREMITITES: No edema.





Objective





- Vital Signs


Vital signs: 


                                   Vital Signs











Temp  97.8 F   08/05/22 08:34


 


Pulse  87   08/05/22 08:34


 


Resp  18   08/05/22 08:41


 


BP  124/62   08/05/22 08:34


 


Pulse Ox  97   08/05/22 08:34


 


FiO2      








                                 Intake & Output











 08/04/22 08/05/22 08/05/22





 18:59 06:59 18:59


 


Intake Total 1714.837 80.948 27.538


 


Output Total 700  500


 


Balance 1014.837 80.948 -472.462


 


Weight 52.163 kg  


 


Intake:   


 


  Intake, IV Titration 34.837 20.948 27.538





  Amount   


 


    Insulin Regular 100 unit 34.837 20.948 27.538





    In Sodium Chloride 0.9%   





    100 ml @ Titrate IV .Q0M   





    [facility] Rx#:[id number]   


 


  Oral 1680 60 


 


Output:   


 


  Urine 700  500


 


Other:   


 


  Voiding Method External Catheter External Catheter External Catheter


 


  # Voids  1 


 


  # Bowel Movements 0 1 














- Labs


CBC & Chem 7: 


                                 08/02/22 03:28





                                 08/04/22 08:48


Labs: 


                  Abnormal Lab Results - Last 24 Hours (Table)











  08/04/22 08/04/22 08/04/22 Range/Units





  08:48 09:07 09:57 


 


Sodium  134 L    (137-145)  mmol/L


 


BUN  38 H    (7-17)  mg/dL


 


Creatinine  1.24 H    (0.52-1.04)  mg/dL


 


Glucose  182 H    (74-99)  mg/dL


 


POC Glucose (mg/dL)   202 H  161 H  ()  mg/dL














  08/04/22 08/04/22 08/04/22 Range/Units





  11:05 12:05 13:07 


 


Sodium     (137-145)  mmol/L


 


BUN     (7-17)  mg/dL


 


Creatinine     (0.52-1.04)  mg/dL


 


Glucose     (74-99)  mg/dL


 


POC Glucose (mg/dL)  148 H  174 H  176 H  ()  mg/dL














  08/04/22 08/04/22 08/04/22 Range/Units





  15:02 15:55 17:09 


 


Sodium     (137-145)  mmol/L


 


BUN     (7-17)  mg/dL


 


Creatinine     (0.52-1.04)  mg/dL


 


Glucose     (74-99)  mg/dL


 


POC Glucose (mg/dL)  267 H  313 H  288 H  ()  mg/dL














  08/04/22 08/04/22 08/04/22 Range/Units





  18:32 19:30 20:30 


 


Sodium     (137-145)  mmol/L


 


BUN     (7-17)  mg/dL


 


Creatinine     (0.52-1.04)  mg/dL


 


Glucose     (74-99)  mg/dL


 


POC Glucose (mg/dL)  264 H  210 H  161 H  ()  mg/dL














  08/04/22 08/04/22 08/05/22 Range/Units





  22:32 23:39 01:31 


 


Sodium     (137-145)  mmol/L


 


BUN     (7-17)  mg/dL


 


Creatinine     (0.52-1.04)  mg/dL


 


Glucose     (74-99)  mg/dL


 


POC Glucose (mg/dL)  135 H  159 H  208 H  ()  mg/dL














  08/05/22 08/05/22 08/05/22 Range/Units





  02:29 03:30 05:03 


 


Sodium     (137-145)  mmol/L


 


BUN     (7-17)  mg/dL


 


Creatinine     (0.52-1.04)  mg/dL


 


Glucose     (74-99)  mg/dL


 


POC Glucose (mg/dL)  188 H  116 H  119 H  ()  mg/dL














  08/05/22 08/05/22 Range/Units





  06:00 07:06 


 


Sodium    (137-145)  mmol/L


 


BUN    (7-17)  mg/dL


 


Creatinine    (0.52-1.04)  mg/dL


 


Glucose    (74-99)  mg/dL


 


POC Glucose (mg/dL)  151 H  159 H  ()  mg/dL














Assessment and Plan


Plan: 





Assessment:


1.  Acute kidney injury secondary to ATN secondary to hypovolemic from 

hyerpglycemia, poor intake and diuresis.  Creatinine trending down - 1.24 

yesterday.  Creatinine in May 2022 was in the range of 1-1.1.  No proteinuria on

UA.  No hydronephrosis noted in right kidney.  Left kidney not visualized due to

bowel gas.


2.  Hypernatremia from lack of oral water intake.  Improved.  Status post D5W.


3.  Pancreatic cancer.


4.  Benign hypertension.  Blood pressure stable.


5.  Dementia.  


6.  Hypokalemia from poor intake and diuresis.  Replaced.  Improved.


7.  Hyperglycemia maintained on insulin drip.  A1c 10.7 this admission.





Plan:


Maintain normal saline.


Avoid nephrotoxins.


Encourage oral intake.


Follow-up morning labs.

## 2022-08-05 NOTE — P.PN
Subjective


Progress Note Date: 08/05/22


This is a 81-year-old female with past medical history of stage IV pancreatic 

cancer, stage IV chronic kidney disease and multiple other medical issues 

brought into the ER by  for increased weakness and confusion and multiple

other medical issues.  Vague historian, currently no family at bedside. Denies 

nausea, vomiting or diarrhea.  Denies abdominal pain.  Denies chest pain, 

palpitations or shortness of breath.  Denies pain.  On admission hypernatremic, 

sodium 152, potassium 3.5, lactic acid 3.6, afebrile, normal WBC, chloride 111, 

bicarb 29, BUN 47, creatinine 1.95, blood sugars in the 400s, alk phos 191, UA 

reporting 4+ glucose, chest x-ray reporting mild chronic progression multiple 

changes bilaterally, symmetric 1 cm densities over bilateral upper lungs not 

clearly identified nodules on recent CT, presumed external to the patient, 

square in shape, cardiac silhouette stable, within normal limits with a 

atherosclerotic change aortic knob.  IV fluid hydration initiated.  Currently re

rene afebrile, WBC increased to 13.5, hemoglobin 10.6, platelets 223, sodium 

worsening up to 160, potassium 3.4, chloride 119 creatinine 2, blood sugars in 

the low 100s, lactic acid decreased to 1.3.  Staff reports Dr. Fox, Oncology, 

met with family last week and they chose no further treatment with radiation, 

chemotherapy and the patient is appropriate for palliative care.





08/03/2022 sitting up in bed, poor diet intake.  Denies pain.  Sodium decreased 

to 148 on D5W.  Renal function slowly improving.  Blood sugars better controlled

on insulin drip currently in the 190s.  Hemoglobin A1c 10.7  Receiving potassium

supplements for potassium 3.3.  Vital signs stable, maintaining O2 sats in the 

high 90s on room air.   is agreeable to palliative care only, as reported

per hospice NP.  No family at bedside this morning.





08/04/2022 continues on D5W, sodium improving down to 134, renal function 

improving, BUN 38, creatinine 1.25.  Requiring insulin drip, blood sugars in the

200s.  Magnesium 1.7.


Denies chest pain, palpitations or shortness of breath.  Denies pain.  Diet 

intake remains poor, 25% this morning for breakfast. 








08/05/2022 diet remains poor, denies pain.  Continues on insulin drip, gentle IV

fluid hydration with normal saline.  Blood sugars controlled.  Sodium 138, c

reatinine 1.42.





Objective





- Vital Signs


Vital signs: 


                                   Vital Signs











Temp  97.8 F   08/05/22 12:43


 


Pulse  84   08/05/22 12:43


 


Resp  18   08/05/22 12:43


 


BP  123/71   08/05/22 12:43


 


Pulse Ox  99   08/05/22 12:43


 


FiO2      








                                 Intake & Output











 08/04/22 08/05/22 08/05/22





 18:59 06:59 18:59


 


Intake Total 1714.837 80.948 45.927


 


Output Total 700  500


 


Balance 1014.837 80.948 -454.073


 


Weight 52.163 kg  


 


Intake:   


 


  Intake, IV Titration 34.837 20.948 45.927





  Amount   


 


    Insulin Regular 100 unit 34.837 20.948 45.927





    In Sodium Chloride 0.9%   





    100 ml @ Titrate IV .Q0M   





    [facility] Rx#:[id number]   


 


  Oral 1680 60 


 


Output:   


 


  Urine 700  500


 


Other:   


 


  Voiding Method External Catheter External Catheter External Catheter


 


  # Voids  1 


 


  # Bowel Movements 0 1 














- Exam


PHYSICAL EXAM:


VITAL SIGNS: As above


GENERAL: Cachectic,pleasant,confused,sitting up in bed, no acute distress, alert

and oriented 1.


HEENT:  Conjunctivae normal. eyes normal.  No sclera icterus ,MMM.


NECK: Supple, No JVD.


CARDIOVASCULAR:  S1, S2 regular. No murmur.


RESPIRATION: Unlabored,Breath sounds diminished in the bases. 


ABDOMEN:  Soft, nondistended, nontender . No guarding. no masses palpable. Bowel

sounds heard.


LEGS:  No edema. no swelling 


NERVOUS SYSTEM: Limited exam; Alert and oriented X1 to person, baseline, 

cooperative.Diffuse weakness.


Skin: Warm and dry, bilateral heels stage I , coccyx X 2, stage II -refer to 

nursing measurements.











- Labs


CBC & Chem 7: 


                                 08/02/22 03:28





                                 08/05/22 10:47


Labs: 


                  Abnormal Lab Results - Last 24 Hours (Table)











  08/04/22 08/04/22 08/04/22 Range/Units





  15:02 15:55 17:09 


 


Chloride     ()  mmol/L


 


BUN     (7-17)  mg/dL


 


Creatinine     (0.52-1.04)  mg/dL


 


Glucose     (74-99)  mg/dL


 


POC Glucose (mg/dL)  267 H  313 H  288 H  ()  mg/dL














  08/04/22 08/04/22 08/04/22 Range/Units





  18:32 19:30 20:30 


 


Chloride     ()  mmol/L


 


BUN     (7-17)  mg/dL


 


Creatinine     (0.52-1.04)  mg/dL


 


Glucose     (74-99)  mg/dL


 


POC Glucose (mg/dL)  264 H  210 H  161 H  ()  mg/dL














  08/04/22 08/04/22 08/05/22 Range/Units





  22:32 23:39 01:31 


 


Chloride     ()  mmol/L


 


BUN     (7-17)  mg/dL


 


Creatinine     (0.52-1.04)  mg/dL


 


Glucose     (74-99)  mg/dL


 


POC Glucose (mg/dL)  135 H  159 H  208 H  ()  mg/dL














  08/05/22 08/05/22 08/05/22 Range/Units





  02:29 03:30 05:03 


 


Chloride     ()  mmol/L


 


BUN     (7-17)  mg/dL


 


Creatinine     (0.52-1.04)  mg/dL


 


Glucose     (74-99)  mg/dL


 


POC Glucose (mg/dL)  188 H  116 H  119 H  ()  mg/dL














  08/05/22 08/05/22 08/05/22 Range/Units





  06:00 07:06 09:01 


 


Chloride     ()  mmol/L


 


BUN     (7-17)  mg/dL


 


Creatinine     (0.52-1.04)  mg/dL


 


Glucose     (74-99)  mg/dL


 


POC Glucose (mg/dL)  151 H  159 H  207 H  ()  mg/dL














  08/05/22 08/05/22 08/05/22 Range/Units





  09:56 10:47 10:57 


 


Chloride   109 H   ()  mmol/L


 


BUN   29 H   (7-17)  mg/dL


 


Creatinine   1.42 H   (0.52-1.04)  mg/dL


 


Glucose   194 H   (74-99)  mg/dL


 


POC Glucose (mg/dL)  248 H   211 H  ()  mg/dL














  08/05/22 08/05/22 Range/Units





  12:08 13:19 


 


Chloride    ()  mmol/L


 


BUN    (7-17)  mg/dL


 


Creatinine    (0.52-1.04)  mg/dL


 


Glucose    (74-99)  mg/dL


 


POC Glucose (mg/dL)  175 H  232 H  ()  mg/dL














Assessment and Plan


Assessment: 


Hypernatremia, hypovolemic, in a patient with decreased appetite with underlying

pancreatic cancer


Acute metabolic encephalopathy secondary to the above in addition to underlying 

dementia


Acute renal failure, likely prerenal


chronic renal failure, stage IV


Hyperchloremia


Hypokalemia


Pressure ulcers, present on admission; bilateral heels stage I , coccyx stage II




Moderate protein calorie malnutrition


History of stage IV pancreatic cancer status post ERCP, biliary stent placement 

at [facility].


Diabetes mellitus, hemoglobin A1c 10.7 secondary to the above, hyperglycemia, 

maintained on insulin drip


COPD, not in exacerbation


Memory impairment/dementia


Hypertension


Hyperlipidemia


Previous history of smoking


No Code














Plan: Continue on current medication regime ,monitoring and symptomatic 

treatment.  Gentle IV fluid hydration.  Insulin drip discontinued.  Low-dose 

Levemir at bedtime with additional sliding scale ordered.  Close monitoring of 

Accu-Cheks. Informational hospice meeting completed.  Palliative care at this 

time, arranged .Prognosis poor, given multiple complex medical issues.  maintain

supportive care.Discharge planning in progress for tomorrow for home with home 

care.

















The impression and plan of care has been dictated as directed.





:


I performed a history and examination of this patient,  discussed the same with 

the dictator.  I agree with the dictator's note ,documented as a scribe.  Any 

additional findings or plans will be noted.

## 2022-08-06 VITALS
HEART RATE: 85 BPM | DIASTOLIC BLOOD PRESSURE: 65 MMHG | SYSTOLIC BLOOD PRESSURE: 127 MMHG | TEMPERATURE: 97.6 F | RESPIRATION RATE: 16 BRPM

## 2022-08-06 LAB
ANION GAP SERPL CALC-SCNC: 8 MMOL/L
BUN SERPL-SCNC: 26 MG/DL (ref 7–17)
CALCIUM SPEC-MCNC: 8.8 MG/DL (ref 8.4–10.2)
CHLORIDE SERPL-SCNC: 108 MMOL/L (ref 98–107)
CO2 SERPL-SCNC: 22 MMOL/L (ref 22–30)
GLUCOSE BLD-MCNC: 145 MG/DL (ref 70–110)
GLUCOSE BLD-MCNC: 224 MG/DL (ref 70–110)
GLUCOSE BLD-MCNC: 49 MG/DL (ref 70–110)
GLUCOSE BLD-MCNC: 55 MG/DL (ref 70–110)
GLUCOSE SERPL-MCNC: 244 MG/DL (ref 74–99)
POTASSIUM SERPL-SCNC: 4.8 MMOL/L (ref 3.5–5.1)
SODIUM SERPL-SCNC: 138 MMOL/L (ref 137–145)

## 2022-08-06 RX ADMIN — ESCITALOPRAM SCH MG: 5 TABLET, FILM COATED ORAL at 09:36

## 2022-08-06 RX ADMIN — MEMANTINE HYDROCHLORIDE SCH MG: 10 TABLET ORAL at 09:36

## 2022-08-06 RX ADMIN — INSULIN ASPART SCH: 100 INJECTION, SOLUTION INTRAVENOUS; SUBCUTANEOUS at 13:35

## 2022-08-06 RX ADMIN — CEFAZOLIN SCH: 330 INJECTION, POWDER, FOR SOLUTION INTRAMUSCULAR; INTRAVENOUS at 06:30

## 2022-08-06 RX ADMIN — Medication SCH MG: at 09:36

## 2022-08-06 RX ADMIN — DEXTROSE MONOHYDRATE PRN ML: 25 INJECTION, SOLUTION INTRAVENOUS at 06:44

## 2022-08-06 RX ADMIN — BUPROPION HYDROCHLORIDE SCH MG: 150 TABLET, FILM COATED, EXTENDED RELEASE ORAL at 09:36

## 2022-08-06 RX ADMIN — ASPIRIN 81 MG CHEWABLE TABLET SCH MG: 81 TABLET CHEWABLE at 09:36

## 2022-08-06 RX ADMIN — CLOPIDOGREL BISULFATE SCH MG: 75 TABLET ORAL at 09:36

## 2022-08-06 RX ADMIN — ATORVASTATIN CALCIUM SCH MG: 20 TABLET, FILM COATED ORAL at 09:36

## 2022-08-06 RX ADMIN — INSULIN ASPART SCH: 100 INJECTION, SOLUTION INTRAVENOUS; SUBCUTANEOUS at 06:30

## 2022-08-06 NOTE — P.DS
Providers


Date of admission: 


08/01/22 13:45





Expected date of discharge: 08/06/22


Attending physician: 


Maximilian Huggins





Consults: 





                                        





08/02/22 11:37


Consult to Palliative Care Routine 


   Consulting Provider: Nicci Cowan


   Consult Reason/Comments: Panc. Ca IV,Dementiafam. met with Dr. Fox 1 week ago

per staff:no


                                                further t


   Do you want consulting provider notified?: Yes





08/02/22 11:43


Consult Physician Stat 


   Consulting Provider: Almaz Bennett


   Consult Reason/Comments: Hypernatremia


   Do you want consulting provider notified?: Yes











Primary care physician: 


[facility] Course: 





 81-year-old female with past medical history of stage IV pancreatic cancer, 

stage IV chronic kidney disease and multiple other medical issues brought into 

the ER by  for increased weakness and confusion and multiple other 

medical issues.  Vague historian, currently no family at bedside. Denies nausea,

vomiting or diarrhea.  Denies abdominal pain.  Denies chest pain, palpitations 

or shortness of breath.  Denies pain.  On admission hypernatremic, sodium 152, 

potassium 3.5, lactic acid 3.6, afebrile, normal WBC, chloride 111, bicarb 29, 

BUN 47, creatinine 1.95, blood sugars in the 400s, alk phos 191, UA reporting 4+

glucose, chest x-ray reporting mild chronic progression multiple changes 

bilaterally, symmetric 1 cm densities over bilateral upper lungs not clearly 

identified nodules on recent CT, presumed external to the patient, square in 

shape, cardiac silhouette stable, within normal limits with a atherosclerotic 

change aortic knob.  IV fluid hydration initiated.  Currently remains afebrile, 

WBC increased to 13.5, hemoglobin 10.6, platelets 223, sodium worsening up to 

160, potassium 3.4, chloride 119 creatinine 2, blood sugars in the low 100s, 

lactic acid decreased to 1.3.  Staff reports Dr. Fox, Oncology, met with family 

last week and they chose no further treatment with radiation, chemotherapy and 

the patient is appropriate for palliative care.





08/03/2022 sitting up in bed, poor diet intake.  Denies pain.  Sodium decreased 

to 148 on D5W.  Renal function slowly improving.  Blood sugars better controlled

on insulin drip currently in the 190s.  Hemoglobin A1c 10.7  Receiving potassium

supplements for potassium 3.3.  Vital signs stable, maintaining O2 sats in the 

high 90s on room air.   is agreeable to palliative care only, as reported

per hospice NP.  No family at bedside this morning.





08/04/2022 continues on D5W, sodium improving down to 134, renal function 

improving, BUN 38, creatinine 1.25.  Requiring insulin drip, blood sugars in the

200s.  Magnesium 1.7.


Denies chest pain, palpitations or shortness of breath.  Denies pain.  Diet 

intake remains poor, 25% this morning for breakfast. 








08/05/2022 diet remains poor, denies pain.  Continues on insulin drip, gentle IV

fluid hydration with normal saline.  Blood sugars controlled.  Sodium 138, 

creatinine 1.42.


Assessment: 


Hypernatremia, hypovolemic, in a patient with decreased appetite with underlying

pancreatic cancer


Acute metabolic encephalopathy secondary to the above in addition to underlying 

dementia


Acute renal failure, likely prerenal


chronic renal failure, stage IV


Hyperchloremia


Hypokalemia


Pressure ulcers, present on admission; bilateral heels stage I , coccyx stage II




Moderate protein calorie malnutrition


History of stage IV pancreatic cancer status post ERCP, biliary stent placement 

at [facility].


Diabetes mellitus, hemoglobin A1c 10.7 secondary to the above, hyperglycemia, 

maintained on insulin drip


COPD, not in exacerbation


Memory impairment/dementia


Hypertension


Hyperlipidemia





Family was recommended hospice.  They have refused; patient is to be discharged 

home with home health care and palliative care


Patient Condition at Discharge: Stable





Plan - Discharge Summary


Discharge Rx Participant: Yes


New Discharge Prescriptions: 


New


   Insulin Detemir (Levemir) [Levemir] 10 unit SQ HS 30 Days #1 each





Continue


   Clopidogrel [Plavix] 75 mg PO DAILY


   Aspirin EC [Ecotrin Low Dose] 81 mg PO DAILY


   Memantine HCl 10 mg PO BID


   Simvastatin 40 mg PO HS


   Furosemide [Lasix] 40 mg PO DAILY


   Ginkgo Biloba 1200mg 1 tab PO DAILY


   Ferrous Sulfate [Iron (65 MG Elemental)] 325 mg PO DAILY


   buPROPion HCL [Wellbutrin XL] 150 mg PO DAILY


   amLODIPine [Norvasc] 5 mg PO DAILY


   Escitalopram [Lexapro] 5 mg PO DAILY


Discharge Medication List





Clopidogrel [Plavix] 75 mg PO DAILY 02/03/15 [History]


Aspirin EC [Ecotrin Low Dose] 81 mg PO DAILY 01/22/21 [History]


Memantine HCl 10 mg PO BID 01/22/21 [History]


Ferrous Sulfate [Iron (65 MG Elemental)] 325 mg PO DAILY 05/05/22 [History]


Ginkgo Biloba 1200mg 1 tab PO DAILY 05/05/22 [History]


Simvastatin 40 mg PO HS 05/05/22 [History]


amLODIPine [Norvasc] 5 mg PO DAILY 05/05/22 [History]


buPROPion HCL [Wellbutrin XL] 150 mg PO DAILY 05/05/22 [History]


Escitalopram [Lexapro] 5 mg PO DAILY 05/20/22 [History]


Furosemide [Lasix] 40 mg PO DAILY 05/20/22 [History]


Insulin Detemir (Levemir) [Levemir] 10 unit SQ HS 30 Days #1 each 08/06/22 [Rx]








Follow up Appointment(s)/Referral(s): 


Maximilian Huggins DO [Primary Care Provider] - 1-2 days


[facility], [NON-STAFF] - 


Activity/Diet/Wound Care/Special Instructions: 


Glucometer and testing supplies filled at [facility] - no copay - have 

spouse  at discharge   


Discharge Disposition: HOME WITH HOME HEALTH SERVICES

## 2022-08-06 NOTE — PN
PROGRESS NOTE



SUBJECTIVE:

The patient is seen for followup for acute kidney injury.  Renal function has improved

with creatinine down to 1.2.  No significant complaints today.



OBJECTIVE:

VITAL SIGNS:  On examination today, blood pressure was 132/77, heart rate 75 per

minute.  The patient is afebrile.

HEART:  S1, S2.

LUNGS:  Bilateral breath sounds are heard.

ABDOMEN:  Soft, nontender.

EXTREMITIES:  Examination of lower extremities shows no significant edema.



LABORATORY DATA:

Labs show sodium 138, potassium 4.8, BUN 26, creatinine 1.23.



ASSESSMENT:

1. Acute kidney injury secondary to acute tubular necrosis and recent diuresis,

    currently improved.  No evidence of proteinuria on urinalysis.  No evidence of

    obstruction noted on the right kidney on ultrasound.  Left kidney was not

    visualized due to bowel gas.

2. Hypernatremia, improved, status post D5W.

3. Pancreatic cancer.

4. Dementia.

5. Hypokalemia from decreased oral intake and diuresis, currently improved.



PLAN:

Continue to encourage increased oral intake.





MMODL / IJN: [id number] / Job#: [id number]